# Patient Record
Sex: FEMALE | Race: WHITE | NOT HISPANIC OR LATINO | Employment: FULL TIME | URBAN - METROPOLITAN AREA
[De-identification: names, ages, dates, MRNs, and addresses within clinical notes are randomized per-mention and may not be internally consistent; named-entity substitution may affect disease eponyms.]

---

## 2017-09-08 ENCOUNTER — OFFICE VISIT (OUTPATIENT)
Dept: FAMILY MEDICINE | Facility: CLINIC | Age: 32
End: 2017-09-08
Payer: COMMERCIAL

## 2017-09-08 VITALS
TEMPERATURE: 98 F | WEIGHT: 224.88 LBS | HEIGHT: 61 IN | OXYGEN SATURATION: 97 % | SYSTOLIC BLOOD PRESSURE: 102 MMHG | DIASTOLIC BLOOD PRESSURE: 64 MMHG | BODY MASS INDEX: 42.46 KG/M2 | HEART RATE: 101 BPM

## 2017-09-08 DIAGNOSIS — F39 MOOD DISORDER: ICD-10-CM

## 2017-09-08 DIAGNOSIS — R53.83 FATIGUE, UNSPECIFIED TYPE: ICD-10-CM

## 2017-09-08 DIAGNOSIS — E04.1 THYROID NODULE: ICD-10-CM

## 2017-09-08 DIAGNOSIS — Z00.00 ANNUAL PHYSICAL EXAM: Primary | ICD-10-CM

## 2017-09-08 PROCEDURE — 99395 PREV VISIT EST AGE 18-39: CPT | Mod: S$GLB,,, | Performed by: FAMILY MEDICINE

## 2017-09-08 PROCEDURE — 99999 PR PBB SHADOW E&M-EST. PATIENT-LVL III: CPT | Mod: PBBFAC,,, | Performed by: FAMILY MEDICINE

## 2017-09-08 RX ORDER — VENLAFAXINE HYDROCHLORIDE 150 MG/1
CAPSULE, EXTENDED RELEASE ORAL
Qty: 90 CAPSULE | Refills: 3 | Status: CANCELLED | OUTPATIENT
Start: 2017-09-08

## 2017-09-08 RX ORDER — VENLAFAXINE HYDROCHLORIDE 37.5 MG/1
CAPSULE, EXTENDED RELEASE ORAL
Qty: 21 CAPSULE | Refills: 11 | Status: SHIPPED | OUTPATIENT
Start: 2017-09-08 | End: 2018-06-11

## 2017-09-08 RX ORDER — DESVENLAFAXINE SUCCINATE 50 MG/1
100 TABLET, EXTENDED RELEASE ORAL DAILY
Qty: 60 TABLET | Refills: 11 | Status: SHIPPED | OUTPATIENT
Start: 2017-09-08 | End: 2018-06-11

## 2017-09-08 NOTE — PROGRESS NOTES
"Subjective:       Patient ID: Lauren K Damico is a 32 y.o. female.    Chief Complaint: Annual Exam    Patient presents for an annual exam. She is on Effexor and is not sure if it is working. She states she is exhausted and doesn't want to get out of bed. She states she used to be constantly on the go.l she states she also feels like the anxiety is taking over after switching from effexor. She is vomiting and breaking out in hives. She also states sometimes she doesn't sleep. She states she has put on 30 pounds since being on Effexor. She was on Pristiq in the past and it worked well. She states her  has noticed a big difference in the last 6 months. 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       Review of Systems   Constitutional: Negative for chills, fatigue and fever.   Respiratory: Negative for cough, chest tightness, shortness of breath and wheezing.    Cardiovascular: Negative for chest pain, palpitations and leg swelling.   Gastrointestinal: Negative for abdominal pain, blood in stool, diarrhea, nausea and vomiting.   Genitourinary: Positive for menstrual problem. Negative for dysuria, frequency and hematuria.   Skin: Negative for rash.   Neurological: Negative for dizziness, syncope and light-headedness.       Objective:       Vitals:    09/08/17 1445   BP: 102/64   Pulse: 101   Temp: 98.1 °F (36.7 °C)   TempSrc: Oral   SpO2: 97%   Weight: 102 kg (224 lb 13.9 oz)   Height: 5' 1" (1.549 m)       Physical Exam   Constitutional: She appears well-developed and well-nourished. No distress.   HENT:   Head: Normocephalic.   Right Ear: External ear normal.   Left Ear: External ear normal.   Mouth/Throat: Oropharynx is clear and moist. No oropharyngeal exudate.   Eyes: Conjunctivae are normal.   Neck: Normal range of motion. Neck supple. Thyromegaly present.   Cardiovascular: Normal rate, regular rhythm and normal heart sounds.  Exam reveals no gallop and no friction rub.    No murmur heard.  Pulmonary/Chest: " Effort normal and breath sounds normal. No respiratory distress. She has no wheezes. She has no rales.   Abdominal: Soft. Bowel sounds are normal. She exhibits no distension and no mass. There is no tenderness. There is no rebound and no guarding.   Musculoskeletal: She exhibits no edema.   Lymphadenopathy:     She has no cervical adenopathy.   Neurological: She is alert.   Skin: No rash noted. She is not diaphoretic.   Psychiatric: She has a normal mood and affect.       Assessment:       1. Annual physical exam    2. Fatigue, unspecified type    3. BMI 30.0-30.9,adult    4. Mood disorder    5. Thyroid nodule        Plan:       Darby MCNALLY was seen today for annual exam.    Diagnoses and all orders for this visit:    Annual physical exam  -     Comprehensive metabolic panel; Future  -     Lipid panel; Future  -     CBC auto differential; Future  Due for labs    Fatigue, unspecified type  -     T4, free; Future  -     TSH; Future  -     T3; Future  -     Vitamin D; Future    BMI 30.0-30.9,adult  -     Hemoglobin A1c; Future  -     Hemoglobin A1c      Mood disorder  -     venlafaxine (EFFEXOR-XR) 37.5 MG 24 hr capsule; TAKE 2 TABLETS DAILY FOR A WEEK, THEN 1 TABLET DAILY FOR A WEEK  -     desvenlafaxine succinate (PRISTIQ) 50 MG Tb24; Take 2 tablets (100 mg total) by mouth once daily.    Thyroid nodule  -     US Soft Tissue Head Neck Thyroid; Future

## 2017-09-18 ENCOUNTER — PATIENT MESSAGE (OUTPATIENT)
Dept: FAMILY MEDICINE | Facility: CLINIC | Age: 32
End: 2017-09-18

## 2017-10-04 ENCOUNTER — LAB VISIT (OUTPATIENT)
Dept: LAB | Facility: HOSPITAL | Age: 32
End: 2017-10-04
Attending: FAMILY MEDICINE
Payer: COMMERCIAL

## 2017-10-04 DIAGNOSIS — R53.83 FATIGUE, UNSPECIFIED TYPE: ICD-10-CM

## 2017-10-04 DIAGNOSIS — Z00.00 ANNUAL PHYSICAL EXAM: ICD-10-CM

## 2017-10-04 LAB
25(OH)D3+25(OH)D2 SERPL-MCNC: 16 NG/ML
ALBUMIN SERPL BCP-MCNC: 3.6 G/DL
ALP SERPL-CCNC: 85 U/L
ALT SERPL W/O P-5'-P-CCNC: 28 U/L
ANION GAP SERPL CALC-SCNC: 8 MMOL/L
AST SERPL-CCNC: 17 U/L
BASOPHILS # BLD AUTO: 0.05 K/UL
BASOPHILS NFR BLD: 0.8 %
BILIRUB SERPL-MCNC: 0.3 MG/DL
BUN SERPL-MCNC: 15 MG/DL
CALCIUM SERPL-MCNC: 9.1 MG/DL
CHLORIDE SERPL-SCNC: 107 MMOL/L
CHOLEST SERPL-MCNC: 181 MG/DL
CHOLEST/HDLC SERPL: 4.5 {RATIO}
CO2 SERPL-SCNC: 24 MMOL/L
CREAT SERPL-MCNC: 0.7 MG/DL
DIFFERENTIAL METHOD: ABNORMAL
EOSINOPHIL # BLD AUTO: 0.2 K/UL
EOSINOPHIL NFR BLD: 3 %
ERYTHROCYTE [DISTWIDTH] IN BLOOD BY AUTOMATED COUNT: 14.3 %
EST. GFR  (AFRICAN AMERICAN): >60 ML/MIN/1.73 M^2
EST. GFR  (NON AFRICAN AMERICAN): >60 ML/MIN/1.73 M^2
GLUCOSE SERPL-MCNC: 114 MG/DL
HCT VFR BLD AUTO: 38.7 %
HDLC SERPL-MCNC: 40 MG/DL
HDLC SERPL: 22.1 %
HGB BLD-MCNC: 12.9 G/DL
LDLC SERPL CALC-MCNC: 123.6 MG/DL
LYMPHOCYTES # BLD AUTO: 2.2 K/UL
LYMPHOCYTES NFR BLD: 35.3 %
MCH RBC QN AUTO: 27.7 PG
MCHC RBC AUTO-ENTMCNC: 33.3 G/DL
MCV RBC AUTO: 83 FL
MONOCYTES # BLD AUTO: 0.5 K/UL
MONOCYTES NFR BLD: 7.3 %
NEUTROPHILS # BLD AUTO: 3.4 K/UL
NEUTROPHILS NFR BLD: 53.6 %
NONHDLC SERPL-MCNC: 141 MG/DL
PLATELET # BLD AUTO: 326 K/UL
PMV BLD AUTO: 8.4 FL
POTASSIUM SERPL-SCNC: 4.2 MMOL/L
PROT SERPL-MCNC: 7.4 G/DL
RBC # BLD AUTO: 4.66 M/UL
SODIUM SERPL-SCNC: 139 MMOL/L
T3 SERPL-MCNC: 103 NG/DL
T4 FREE SERPL-MCNC: 0.79 NG/DL
TRIGL SERPL-MCNC: 87 MG/DL
TSH SERPL DL<=0.005 MIU/L-ACNC: 1.79 UIU/ML
WBC # BLD AUTO: 6.31 K/UL

## 2017-10-04 PROCEDURE — 85025 COMPLETE CBC W/AUTO DIFF WBC: CPT

## 2017-10-04 PROCEDURE — 80061 LIPID PANEL: CPT

## 2017-10-04 PROCEDURE — 80053 COMPREHEN METABOLIC PANEL: CPT

## 2017-10-04 PROCEDURE — 84439 ASSAY OF FREE THYROXINE: CPT

## 2017-10-04 PROCEDURE — 84480 ASSAY TRIIODOTHYRONINE (T3): CPT

## 2017-10-04 PROCEDURE — 84443 ASSAY THYROID STIM HORMONE: CPT

## 2017-10-04 PROCEDURE — 82306 VITAMIN D 25 HYDROXY: CPT

## 2017-10-04 PROCEDURE — 36415 COLL VENOUS BLD VENIPUNCTURE: CPT

## 2018-06-11 ENCOUNTER — OFFICE VISIT (OUTPATIENT)
Dept: FAMILY MEDICINE | Facility: CLINIC | Age: 33
End: 2018-06-11
Payer: COMMERCIAL

## 2018-06-11 VITALS
TEMPERATURE: 99 F | WEIGHT: 232.56 LBS | DIASTOLIC BLOOD PRESSURE: 74 MMHG | HEIGHT: 61 IN | BODY MASS INDEX: 43.91 KG/M2 | HEART RATE: 100 BPM | OXYGEN SATURATION: 97 % | SYSTOLIC BLOOD PRESSURE: 120 MMHG

## 2018-06-11 DIAGNOSIS — F39 MOOD DISORDER: Primary | ICD-10-CM

## 2018-06-11 PROCEDURE — 99214 OFFICE O/P EST MOD 30 MIN: CPT | Mod: S$GLB,,, | Performed by: FAMILY MEDICINE

## 2018-06-11 PROCEDURE — 99999 PR PBB SHADOW E&M-EST. PATIENT-LVL III: CPT | Mod: PBBFAC,,, | Performed by: FAMILY MEDICINE

## 2018-06-11 RX ORDER — DESVENLAFAXINE 100 MG/1
200 TABLET, EXTENDED RELEASE ORAL DAILY
Qty: 60 TABLET | Refills: 5 | Status: SHIPPED | OUTPATIENT
Start: 2018-06-11 | End: 2019-01-02 | Stop reason: SDUPTHER

## 2018-06-11 RX ORDER — ALPRAZOLAM 0.5 MG/1
0.5 TABLET ORAL NIGHTLY PRN
Qty: 30 TABLET | Refills: 2 | Status: SHIPPED | OUTPATIENT
Start: 2018-06-11 | End: 2018-10-21 | Stop reason: SDUPTHER

## 2018-06-11 RX ORDER — DESVENLAFAXINE 100 MG/1
100 TABLET, EXTENDED RELEASE ORAL DAILY
Refills: 8 | COMMUNITY
Start: 2018-06-02 | End: 2018-06-11 | Stop reason: SDUPTHER

## 2018-06-11 RX ORDER — ALPRAZOLAM 0.5 MG/1
0.5 TABLET ORAL 3 TIMES DAILY
Refills: 0 | COMMUNITY
Start: 2018-04-14 | End: 2018-06-11 | Stop reason: SDUPTHER

## 2018-06-11 NOTE — PROGRESS NOTES
"Subjective:       Patient ID: Lauren K Damico is a 33 y.o. female.    Chief Complaint: Annual Exam and Discuss Medication (Xanax)    Patient is here for follow-up on her pristiq. She states she is getting anxiety attacks and she states her face and skin gets red and she has to vomit. She states the pristiq has worked for her energy and over eating. She states her father in law and her her grandmother passed away. She states she also miscarried 3 weeks after her grandmother passed away. She has tried yoga and essential oils. She states her cousin, who is a physician,  gave her a script for xanax and it helped. e  She states she is going to see Dr. Sethi to discuss fertility. She states she has been trying a year. She states she taks ovulation tests and states they are always negative. She typically doesn't have consecutive peirods.       Review of Systems    Objective:       Vitals:    06/11/18 1508   BP: 120/74   Pulse: 100   Temp: 98.5 °F (36.9 °C)   TempSrc: Oral   SpO2: 97%   Weight: 105.5 kg (232 lb 9.4 oz)   Height: 5' 1" (1.549 m)       Physical Exam   Constitutional: She is oriented to person, place, and time. She appears well-developed and well-nourished. No distress.   HENT:   Head: Normocephalic and atraumatic.   Neck: Normal range of motion. Neck supple. No thyromegaly present.   Cardiovascular: Normal rate and regular rhythm.  Exam reveals no gallop and no friction rub.    No murmur heard.  Pulmonary/Chest: Effort normal and breath sounds normal. No respiratory distress. She has no wheezes. She has no rales.   Neurological: She is alert and oriented to person, place, and time.   Skin: She is not diaphoretic.       Assessment:       1. Mood disorder        Plan:       Darby MCNALLY was seen today for annual exam and discuss medication (xanax).    Diagnoses and all orders for this visit:    Mood disorder  -     desvenlafaxine succinate (PRISTIQ) 100 MG Tb24; Take 2 tablets (200 mg total) by mouth once " daily.  -     ALPRAZolam (XANAX) 0.5 MG tablet; Take 1 tablet (0.5 mg total) by mouth nightly as needed for Anxiety.

## 2018-08-03 ENCOUNTER — TELEPHONE (OUTPATIENT)
Dept: OBSTETRICS AND GYNECOLOGY | Facility: CLINIC | Age: 33
End: 2018-08-03

## 2018-08-13 ENCOUNTER — OFFICE VISIT (OUTPATIENT)
Dept: FAMILY MEDICINE | Facility: CLINIC | Age: 33
End: 2018-08-13
Payer: COMMERCIAL

## 2018-08-13 VITALS
BODY MASS INDEX: 43.54 KG/M2 | WEIGHT: 230.63 LBS | DIASTOLIC BLOOD PRESSURE: 74 MMHG | HEIGHT: 61 IN | OXYGEN SATURATION: 98 % | HEART RATE: 94 BPM | SYSTOLIC BLOOD PRESSURE: 106 MMHG

## 2018-08-13 DIAGNOSIS — F39 MOOD DISORDER: ICD-10-CM

## 2018-08-13 DIAGNOSIS — R73.9 ELEVATED BLOOD SUGAR: Primary | ICD-10-CM

## 2018-08-13 PROCEDURE — 99214 OFFICE O/P EST MOD 30 MIN: CPT | Mod: S$GLB,,, | Performed by: FAMILY MEDICINE

## 2018-08-13 PROCEDURE — 99999 PR PBB SHADOW E&M-EST. PATIENT-LVL III: CPT | Mod: PBBFAC,,, | Performed by: FAMILY MEDICINE

## 2018-08-13 RX ORDER — DICYCLOMINE HYDROCHLORIDE 10 MG/1
CAPSULE ORAL
Refills: 1 | COMMUNITY
Start: 2018-07-24 | End: 2020-08-28

## 2018-08-13 NOTE — PROGRESS NOTES
"Subjective:       Patient ID: Lauren K Damico is a 33 y.o. female.    Chief Complaint: Mood Disorder and Follow-up    Patient present for follow-up on the Pristiq. She states she is doing much better. She is up to 200 mg. She has used the xanax a few times. She has some GI issues that she is seeing GI for. She is scheduled for an EGD and colonoscopy. She was told that she had an elevated blood sugar. She is considering having a gastric sleeve if her A1C is elevated. She states she doesn't have the fluctuations in her mood anymore. She is not sleeping all the time and her appetite has slowed down. She is not eating frequently. She had labs done by Leydi YEH with Dr. ESQUEDA on 7/26/18 at [facility].       Review of Systems    Objective:       Vitals:    08/13/18 1211   BP: 106/74   Pulse: 94   SpO2: 98%   Weight: 104.6 kg (230 lb 9.6 oz)   Height: 5' 1" (1.549 m)       Physical Exam   Constitutional: She is oriented to person, place, and time. She appears well-developed and well-nourished. No distress.   HENT:   Head: Normocephalic and atraumatic.   Neck: Normal range of motion. Neck supple.   Cardiovascular: Normal rate, regular rhythm and normal heart sounds. Exam reveals no gallop and no friction rub.   No murmur heard.  Neurological: She is alert and oriented to person, place, and time.   Skin: She is not diaphoretic.       Assessment:       1. Elevated blood sugar    2. Mood disorder        Plan:       Darby MCNALLY was seen today for mood disorder and follow-up.    Diagnoses and all orders for this visit:    Elevated blood sugar  -     Hemoglobin A1c; Future  -     Hemoglobin A1c  Ordering a1c and if it is elevated she wants to consider the gastric sleeve.   Mood disorder    continue pristiq 200 mg daily as she is stable.      "

## 2018-08-30 LAB — HBA1C MFR BLD: 5.8 %

## 2018-08-31 ENCOUNTER — OFFICE VISIT (OUTPATIENT)
Dept: OBSTETRICS AND GYNECOLOGY | Facility: CLINIC | Age: 33
End: 2018-08-31
Attending: OBSTETRICS & GYNECOLOGY
Payer: COMMERCIAL

## 2018-08-31 VITALS
DIASTOLIC BLOOD PRESSURE: 80 MMHG | HEIGHT: 61 IN | BODY MASS INDEX: 43.79 KG/M2 | WEIGHT: 231.94 LBS | SYSTOLIC BLOOD PRESSURE: 110 MMHG

## 2018-08-31 DIAGNOSIS — Z31.69 ENCOUNTER FOR PRECONCEPTION CONSULTATION: ICD-10-CM

## 2018-08-31 DIAGNOSIS — Z01.419 WELL WOMAN EXAM WITH ROUTINE GYNECOLOGICAL EXAM: Primary | ICD-10-CM

## 2018-08-31 DIAGNOSIS — N91.5 OLIGOMENORRHEA, UNSPECIFIED TYPE: ICD-10-CM

## 2018-08-31 LAB — HBA1C MFR BLD: 5.8 % OF TOTAL HGB

## 2018-08-31 PROCEDURE — 87624 HPV HI-RISK TYP POOLED RSLT: CPT

## 2018-08-31 PROCEDURE — 88141 CYTOPATH C/V INTERPRET: CPT | Mod: ,,, | Performed by: PATHOLOGY

## 2018-08-31 PROCEDURE — 88175 CYTOPATH C/V AUTO FLUID REDO: CPT | Performed by: PATHOLOGY

## 2018-08-31 PROCEDURE — 99385 PREV VISIT NEW AGE 18-39: CPT | Mod: 25,S$GLB,, | Performed by: OBSTETRICS & GYNECOLOGY

## 2018-08-31 NOTE — PROGRESS NOTES
CC: Well woman exam    Lauren K Damico is a 33 y.o. female  presents for well woman exam.  LMP: Patient's last menstrual period was 08/15/2018 (exact date)..  Previously on OCPs until 2015 was on loestrin from age [age].  Regular cycles x last 5 months, meaning occurring monthly though last up to 21 days, heavy with clotting, cramping.  Reports that she has cramping and a lot of pain during periods, diarrhea x multiple episodes during periods.  Using OPKs.  Vaginal pain during periods.  History of very abnormal periods, also reports that 6-7 days prior to periods, has a 6 hour period of projectile vomiting, then stops, usually wakes her from deep sleep, negative EGD/ colonoscopy.  GI then told the patient to f/u with GYN.  Killian Holt several years ago, was noted to have benign appearing ovarian cysts at that time.  Multiple family members with PCOS.  Reports consistent weight gain witn no changes in diet.  Reports that she has gained 60 pounds over the past 6 years, attributes some of this possibly to medications.  Has tried multiple diet systems without changes, reports clean eating overall.  Has been talking to her PCP about possible gastric sleeve.      Past Medical History:   Diagnosis Date    Abnormal Pap smear 12    colpo/LEEP    Anemia     Anxiety     PCOS (polycystic ovarian syndrome)      Past Surgical History:   Procedure Laterality Date    CERVICAL BIOPSY  W/ LOOP ELECTRODE EXCISION  10-17-12    GALLBLADDER SURGERY      WISDOM TOOTH EXTRACTION       Social History     Socioeconomic History    Marital status:      Spouse name: Not on file    Number of children: Not on file    Years of education: Not on file    Highest education level: Not on file   Social Needs    Financial resource strain: Not on file    Food insecurity - worry: Not on file    Food insecurity - inability: Not on file    Transportation needs - medical: Not on file    Transportation needs - non-medical: Not on  "file   Occupational History    Not on file   Tobacco Use    Smoking status: Never Smoker    Smokeless tobacco: Never Used   Substance and Sexual Activity    Alcohol use: Yes     Alcohol/week: 0.6 oz     Types: 1 Glasses of wine per week    Drug use: No    Sexual activity: Yes     Partners: Male     Birth control/protection: None   Other Topics Concern    Not on file   Social History Narrative    Not on file     Family History   Problem Relation Age of Onset    Ovarian cancer Paternal Grandmother     Breast cancer Paternal Aunt     Ovarian cancer Paternal Aunt     Breast cancer Paternal Aunt     Uterine cancer Paternal Aunt     Colon cancer Neg Hx      OB History      Para Term  AB Living    0              SAB TAB Ectopic Multiple Live Births                       /80   Ht 5' 1" (1.549 m)   Wt 105.2 kg (231 lb 14.8 oz)   LMP 08/15/2018 (Exact Date)   BMI 43.82 kg/m²       ROS:  GENERAL: Denies weight gain or weight loss. Feeling well overall.   SKIN: Denies rash or lesions.   HEAD: Denies head injury or headache.   NODES: Denies enlarged lymph nodes.   CHEST: Denies chest pain or shortness of breath.   CARDIOVASCULAR: Denies palpitations or left sided chest pain.   ABDOMEN: No abdominal pain, constipation, diarrhea, nausea, vomiting or rectal bleeding.   URINARY: No frequency, dysuria, hematuria, or burning on urination.  REPRODUCTIVE: See HPI.   BREASTS: The patient performs breast self-examination and denies pain, lumps, or nipple discharge.   HEMATOLOGIC: No easy bruisability or excessive bleeding.   MUSCULOSKELETAL: Denies joint pain or swelling.   NEUROLOGIC: Denies syncope or weakness.   PSYCHIATRIC: Denies depression, anxiety or mood swings.    PHYSICAL EXAM:  APPEARANCE: Well nourished, well developed, in no acute distress.  AFFECT: WNL, alert and oriented x 3  SKIN: No acne or hirsutism  NECK: Neck symmetric without masses or thyromegaly  NODES: No inguinal, cervical, " axillary, or femoral lymph node enlargement  CHEST: Good respiratory effect  ABDOMEN: Soft.  No tenderness or masses.  No hepatosplenomegaly.  No hernias.  BREASTS: Symmetrical, no skin changes or visible lesions.  No palpable masses, nipple discharge bilaterally.  PELVIC: Normal external genitalia without lesions.  Normal hair distribution.  Adequate perineal body, normal urethral meatus.  Vagina moist and well rugated without lesions or discharge.  Cervix pink, without lesions, discharge or tenderness.  No significant cystocele or rectocele.  Bimanual exam shows uterus to be normal size, regular, mobile and nontender.  Adnexa without masses or tenderness.    EXTREMITIES: No edema.    Well woman exam with routine gynecological exam  -     Liquid-based pap smear, screening  -     HPV High Risk Genotypes, PCR    Encounter for preconception consultation        - Offered options for manamgement of likely PCOS.  Discussed clomid vs weight loss surgery with OCPS and revisiting conception after normalizing HbA1c and weight.  Patient opts for the latter option at this point.    Oligomenorrhea, unspecified type  -     US Pelvis Comp with Transvag NON-OB (xpd; Future; Expected date: 08/31/2018    Other orders  -     norethindrone-e.estradiol-iron (JUNEL FE 24) 1 mg-20 mcg (24)/75 mg (4) Oral per tablet; Take 1 tablet by mouth once daily.  Dispense: 30 tablet; Refill: 11            Patient was counseled today on A.C.S. Pap guidelines and recommendations for yearly pelvic exams, mammograms and monthly self breast exams; to see her PCP for other health maintenance.     No Follow-up on file.

## 2018-09-06 LAB
HPV HR 12 DNA CVX QL NAA+PROBE: NEGATIVE
HPV16 AG SPEC QL: NEGATIVE
HPV18 DNA SPEC QL NAA+PROBE: NEGATIVE

## 2018-10-06 ENCOUNTER — TELEPHONE (OUTPATIENT)
Dept: ADMINISTRATIVE | Facility: HOSPITAL | Age: 33
End: 2018-10-06

## 2018-10-22 RX ORDER — ALPRAZOLAM 0.5 MG/1
TABLET ORAL
Qty: 30 TABLET | Refills: 0 | Status: SHIPPED | OUTPATIENT
Start: 2018-10-22 | End: 2019-01-02 | Stop reason: SDUPTHER

## 2018-11-12 ENCOUNTER — OFFICE VISIT (OUTPATIENT)
Dept: FAMILY MEDICINE | Facility: CLINIC | Age: 33
End: 2018-11-12
Payer: COMMERCIAL

## 2018-11-12 ENCOUNTER — TELEPHONE (OUTPATIENT)
Dept: FAMILY MEDICINE | Facility: CLINIC | Age: 33
End: 2018-11-12

## 2018-11-12 ENCOUNTER — PATIENT MESSAGE (OUTPATIENT)
Dept: FAMILY MEDICINE | Facility: CLINIC | Age: 33
End: 2018-11-12

## 2018-11-12 VITALS
WEIGHT: 235.25 LBS | TEMPERATURE: 98 F | OXYGEN SATURATION: 97 % | HEART RATE: 85 BPM | SYSTOLIC BLOOD PRESSURE: 110 MMHG | HEIGHT: 61 IN | DIASTOLIC BLOOD PRESSURE: 62 MMHG | BODY MASS INDEX: 44.42 KG/M2

## 2018-11-12 DIAGNOSIS — Z01.818 PREOP EXAMINATION: Primary | ICD-10-CM

## 2018-11-12 PROCEDURE — 99999 PR PBB SHADOW E&M-EST. PATIENT-LVL III: CPT | Mod: PBBFAC,,, | Performed by: FAMILY MEDICINE

## 2018-11-12 PROCEDURE — 93010 ELECTROCARDIOGRAM REPORT: CPT | Mod: S$GLB,,, | Performed by: INTERNAL MEDICINE

## 2018-11-12 PROCEDURE — 93005 ELECTROCARDIOGRAM TRACING: CPT | Mod: S$GLB,,, | Performed by: FAMILY MEDICINE

## 2018-11-12 PROCEDURE — 99214 OFFICE O/P EST MOD 30 MIN: CPT | Mod: S$GLB,,, | Performed by: FAMILY MEDICINE

## 2018-11-12 RX ORDER — HYDROGEN PEROXIDE 3 %
20 SOLUTION, NON-ORAL MISCELLANEOUS 2 TIMES DAILY
Refills: 0 | COMMUNITY
Start: 2018-10-23

## 2018-11-12 NOTE — PROGRESS NOTES
"Subjective:       Patient ID: Lauren K Damico is a 33 y.o. female.    Chief Complaint: pre op clearance    Patient presents for preoperative clearance for gastric sleeve. She is going to do the gastric sleeve with Dr. Jasson Short.       Review of Systems   Constitutional: Negative for activity change, chills, fatigue, fever and unexpected weight change.   HENT: Negative for hearing loss, rhinorrhea and trouble swallowing.    Eyes: Negative for discharge and visual disturbance.   Respiratory: Positive for shortness of breath. Negative for cough, chest tightness and wheezing.    Cardiovascular: Negative for chest pain, palpitations and leg swelling.   Gastrointestinal: Negative for abdominal pain, blood in stool, constipation, diarrhea, nausea and vomiting.   Endocrine: Negative for polydipsia and polyuria.   Genitourinary: Negative for difficulty urinating, dysuria, hematuria and menstrual problem.   Musculoskeletal: Negative for arthralgias, joint swelling and neck pain.   Neurological: Negative for dizziness, syncope, weakness, light-headedness and headaches.   Psychiatric/Behavioral: Negative for confusion and dysphoric mood.       Objective:       Vitals:    11/12/18 1129   BP: 110/62   Pulse: 85   Temp: 98.4 °F (36.9 °C)   TempSrc: Oral   SpO2: 97%   Weight: 106.7 kg (235 lb 3.7 oz)   Height: 5' 1" (1.549 m)       Physical Exam   Constitutional: She appears well-developed and well-nourished. No distress.   HENT:   Head: Normocephalic.   Right Ear: External ear normal.   Left Ear: External ear normal.   Mouth/Throat: Oropharynx is clear and moist. No oropharyngeal exudate.   Eyes: Conjunctivae are normal.   Neck: Normal range of motion. Neck supple. No thyromegaly present.   Cardiovascular: Normal rate, regular rhythm and normal heart sounds. Exam reveals no gallop and no friction rub.   No murmur heard.  Pulmonary/Chest: Effort normal and breath sounds normal. No respiratory distress. She has no wheezes. She " has no rales.   Abdominal: Soft. Bowel sounds are normal. She exhibits no distension and no mass. There is no tenderness. There is no rebound and no guarding.   Musculoskeletal: She exhibits no edema.   Lymphadenopathy:     She has no cervical adenopathy.   Neurological: She is alert.   Skin: No rash noted. She is not diaphoretic.   Psychiatric: She has a normal mood and affect.       Assessment:       1. Preop examination        Plan:       Darby MCNALLY was seen today for pre op clearance.    Diagnoses and all orders for this visit:    Preop examination  -     EKG 12-lead      Labs and ekg reviewed. She is cleared for surgery.        Send preop clearance to Dr. Jasson TAVAREZ -[phone] (FAX) [phone] (PHONE)

## 2018-11-12 NOTE — TELEPHONE ENCOUNTER
----- Message from Marie Fang MD sent at 11/12/2018  3:17 PM CST -----  Send preop clearance to Dr. Jasson TAVAREZ -[phone] (FAX) [phone] (PHONE). clearance Letter and note

## 2019-01-02 RX ORDER — DESVENLAFAXINE 100 MG/1
TABLET, EXTENDED RELEASE ORAL
Qty: 60 TABLET | Refills: 5 | Status: SHIPPED | OUTPATIENT
Start: 2019-01-02 | End: 2019-05-29 | Stop reason: SDUPTHER

## 2019-01-02 RX ORDER — ALPRAZOLAM 0.5 MG/1
TABLET ORAL
Qty: 30 TABLET | Refills: 3 | Status: SHIPPED | OUTPATIENT
Start: 2019-01-02 | End: 2019-05-30 | Stop reason: SDUPTHER

## 2019-01-09 ENCOUNTER — OFFICE VISIT (OUTPATIENT)
Dept: FAMILY MEDICINE | Facility: CLINIC | Age: 34
End: 2019-01-09
Payer: COMMERCIAL

## 2019-01-09 VITALS
TEMPERATURE: 98 F | SYSTOLIC BLOOD PRESSURE: 118 MMHG | OXYGEN SATURATION: 97 % | HEIGHT: 61 IN | DIASTOLIC BLOOD PRESSURE: 70 MMHG | BODY MASS INDEX: 40.25 KG/M2 | WEIGHT: 213.19 LBS | HEART RATE: 107 BPM

## 2019-01-09 DIAGNOSIS — R30.0 DYSURIA: Primary | ICD-10-CM

## 2019-01-09 DIAGNOSIS — N30.00 ACUTE CYSTITIS WITHOUT HEMATURIA: ICD-10-CM

## 2019-01-09 LAB
BACTERIA #/AREA URNS HPF: ABNORMAL /HPF
BILIRUB UR QL STRIP: NEGATIVE
CLARITY UR: ABNORMAL
COLOR UR: ABNORMAL
GLUCOSE UR QL STRIP: NEGATIVE
HGB UR QL STRIP: ABNORMAL
HYALINE CASTS #/AREA URNS LPF: 0 /LPF
KETONES UR QL STRIP: ABNORMAL
LEUKOCYTE ESTERASE UR QL STRIP: ABNORMAL
MICROSCOPIC COMMENT: ABNORMAL
NITRITE UR QL STRIP: POSITIVE
PH UR STRIP: 7 [PH] (ref 5–8)
PROT UR QL STRIP: ABNORMAL
RBC #/AREA URNS HPF: 2 /HPF (ref 0–4)
SP GR UR STRIP: 1.02 (ref 1–1.03)
SQUAMOUS #/AREA URNS HPF: 5 /HPF
URN SPEC COLLECT METH UR: ABNORMAL
UROBILINOGEN UR STRIP-ACNC: ABNORMAL EU/DL
WBC #/AREA URNS HPF: 15 /HPF (ref 0–5)

## 2019-01-09 PROCEDURE — 3008F BODY MASS INDEX DOCD: CPT | Mod: CPTII,S$GLB,, | Performed by: PHYSICIAN ASSISTANT

## 2019-01-09 PROCEDURE — 99214 PR OFFICE/OUTPT VISIT, EST, LEVL IV, 30-39 MIN: ICD-10-PCS | Mod: S$GLB,,, | Performed by: PHYSICIAN ASSISTANT

## 2019-01-09 PROCEDURE — 99999 PR PBB SHADOW E&M-EST. PATIENT-LVL III: CPT | Mod: PBBFAC,,, | Performed by: PHYSICIAN ASSISTANT

## 2019-01-09 PROCEDURE — 87186 SC STD MICRODIL/AGAR DIL: CPT

## 2019-01-09 PROCEDURE — 81000 URINALYSIS NONAUTO W/SCOPE: CPT

## 2019-01-09 PROCEDURE — 99999 PR PBB SHADOW E&M-EST. PATIENT-LVL III: ICD-10-PCS | Mod: PBBFAC,,, | Performed by: PHYSICIAN ASSISTANT

## 2019-01-09 PROCEDURE — 99214 OFFICE O/P EST MOD 30 MIN: CPT | Mod: S$GLB,,, | Performed by: PHYSICIAN ASSISTANT

## 2019-01-09 PROCEDURE — 3008F PR BODY MASS INDEX (BMI) DOCUMENTED: ICD-10-PCS | Mod: CPTII,S$GLB,, | Performed by: PHYSICIAN ASSISTANT

## 2019-01-09 PROCEDURE — 87077 CULTURE AEROBIC IDENTIFY: CPT

## 2019-01-09 PROCEDURE — 87088 URINE BACTERIA CULTURE: CPT

## 2019-01-09 PROCEDURE — 87086 URINE CULTURE/COLONY COUNT: CPT

## 2019-01-09 RX ORDER — NITROFURANTOIN 25; 75 MG/1; MG/1
100 CAPSULE ORAL 2 TIMES DAILY
Qty: 6 CAPSULE | Refills: 0 | Status: SHIPPED | OUTPATIENT
Start: 2019-01-09 | End: 2019-01-11 | Stop reason: ALTCHOICE

## 2019-01-09 NOTE — PROGRESS NOTES
Subjective:       Patient ID: Lauren K Damico is a 33 y.o. female with multiple medical diagnoses as listed in the medical history and problem list that presents for Urinary Tract Infection  .    Chief Complaint: Urinary Tract Infection      Urinary Tract Infection    This is a new problem. The current episode started in the past 7 days (sunday). The problem occurs every urination. The problem has been gradually worsening. The quality of the pain is described as burning. There has been no fever. She is sexually active. There is no history of pyelonephritis. Associated symptoms include frequency and urgency. Pertinent negatives include no chills, discharge, flank pain, vomiting or bubble bath use. Treatments tried: azo last dose this am  The treatment provided mild relief. There is no history of recurrent UTIs.     Review of Systems   Constitutional: Negative for chills.   Gastrointestinal: Negative for vomiting.   Genitourinary: Positive for frequency and urgency. Negative for flank pain.         PAST MEDICAL HISTORY:  Past Medical History:   Diagnosis Date    Abnormal Pap smear 9/19/12    colpo/LEEP    Anemia     Anxiety     PCOS (polycystic ovarian syndrome)        SOCIAL HISTORY:  Social History     Socioeconomic History    Marital status:      Spouse name: Not on file    Number of children: Not on file    Years of education: Not on file    Highest education level: Not on file   Social Needs    Financial resource strain: Not on file    Food insecurity - worry: Not on file    Food insecurity - inability: Not on file    Transportation needs - medical: Not on file    Transportation needs - non-medical: Not on file   Occupational History    Not on file   Tobacco Use    Smoking status: Never Smoker    Smokeless tobacco: Never Used   Substance and Sexual Activity    Alcohol use: Yes     Alcohol/week: 0.6 oz     Types: 1 Glasses of wine per week    Drug use: No    Sexual activity: Yes      "Partners: Male     Birth control/protection: None   Other Topics Concern    Not on file   Social History Narrative    Not on file       ALLERGIES AND MEDICATIONS: updated and reviewed.  Review of patient's allergies indicates:  No Known Allergies  Current Outpatient Medications   Medication Sig Dispense Refill    ALPRAZolam (XANAX) 0.5 MG tablet TAKE ONE TABLET BY MOUTH NIGHTLY AS NEEDED FOR ANXIETY 30 tablet 3    biotin 2,500 mcg Tab Take 3 tablets by mouth Daily.  Capsule Oral       cholecalciferol, vitamin D3, (VITAMIN D) 5,000 unit Tab Take 1 tablet by mouth Daily.  Tablet Oral       desvenlafaxine succinate (PRISTIQ) 100 MG Tb24 TAKE 2 TABLETS ONCE DAILY 60 tablet 5    dicyclomine (BENTYL) 10 MG capsule TAKE ONE CAPSULE BY MOUTH TWICE DAILY MAY CAUSE DROWSINESS MAY CAUSE DIZZINESS  1    esomeprazole (NEXIUM) 20 MG capsule Take 20 mg by mouth 2 (two) times daily.  0    nitrofurantoin, macrocrystal-monohydrate, (MACROBID) 100 MG capsule Take 1 capsule (100 mg total) by mouth 2 (two) times daily. for 3 days 6 capsule 0    norethindrone-e.estradiol-iron (JUNEL FE 24) 1 mg-20 mcg (24)/75 mg (4) Oral per tablet Take 1 tablet by mouth once daily. 30 tablet 11     No current facility-administered medications for this visit.          Objective:   /70   Pulse 107   Temp 97.5 °F (36.4 °C) (Oral)   Ht 5' 1" (1.549 m)   Wt 96.7 kg (213 lb 3 oz)   LMP 12/10/2018   SpO2 97%   BMI 40.28 kg/m²      Physical Exam   Constitutional: She is oriented to person, place, and time. She appears well-developed and well-nourished. No distress.   Cardiovascular: Normal rate and regular rhythm.   Pulmonary/Chest: Effort normal and breath sounds normal.   Abdominal: Soft. There is no tenderness. There is no CVA tenderness.   Neurological: She is alert and oriented to person, place, and time.   Skin: Skin is warm and dry. No rash noted.   Psychiatric: She has a normal mood and affect. Her behavior is normal.     "       Assessment:       1. Dysuria    2. Acute cystitis without hematuria        Plan:       Dysuria  -     Urinalysis; Future; Expected date: 01/10/2019  -     Urine culture; Future; Expected date: 01/09/2019    Acute cystitis without hematuria  -     nitrofurantoin, macrocrystal-monohydrate, (MACROBID) 100 MG capsule; Take 1 capsule (100 mg total) by mouth 2 (two) times daily. for 3 days  Dispense: 6 capsule; Refill: 0    -will contact with results           No Follow-up on file.

## 2019-01-11 DIAGNOSIS — N30.00 ACUTE CYSTITIS WITHOUT HEMATURIA: Primary | ICD-10-CM

## 2019-01-11 LAB — BACTERIA UR CULT: NORMAL

## 2019-01-11 RX ORDER — SULFAMETHOXAZOLE AND TRIMETHOPRIM 800; 160 MG/1; MG/1
1 TABLET ORAL 2 TIMES DAILY
Qty: 6 TABLET | Refills: 0 | Status: SHIPPED | OUTPATIENT
Start: 2019-01-11 | End: 2019-01-14

## 2019-01-29 ENCOUNTER — TELEPHONE (OUTPATIENT)
Dept: ADMINISTRATIVE | Facility: HOSPITAL | Age: 34
End: 2019-01-29

## 2019-05-16 RX ORDER — ALPRAZOLAM 0.5 MG/1
TABLET ORAL
Qty: 30 TABLET | OUTPATIENT
Start: 2019-05-16

## 2019-05-28 ENCOUNTER — PATIENT MESSAGE (OUTPATIENT)
Dept: ADMINISTRATIVE | Facility: OTHER | Age: 34
End: 2019-05-28

## 2019-05-29 ENCOUNTER — OFFICE VISIT (OUTPATIENT)
Dept: FAMILY MEDICINE | Facility: CLINIC | Age: 34
End: 2019-05-29
Payer: COMMERCIAL

## 2019-05-29 VITALS
WEIGHT: 189.38 LBS | DIASTOLIC BLOOD PRESSURE: 64 MMHG | HEART RATE: 95 BPM | SYSTOLIC BLOOD PRESSURE: 110 MMHG | BODY MASS INDEX: 35.75 KG/M2 | TEMPERATURE: 99 F | OXYGEN SATURATION: 99 % | HEIGHT: 61 IN

## 2019-05-29 DIAGNOSIS — F39 MOOD DISORDER: Primary | ICD-10-CM

## 2019-05-29 PROCEDURE — 99999 PR PBB SHADOW E&M-EST. PATIENT-LVL III: CPT | Mod: PBBFAC,,, | Performed by: FAMILY MEDICINE

## 2019-05-29 PROCEDURE — 3008F PR BODY MASS INDEX (BMI) DOCUMENTED: ICD-10-PCS | Mod: CPTII,S$GLB,, | Performed by: FAMILY MEDICINE

## 2019-05-29 PROCEDURE — 3008F BODY MASS INDEX DOCD: CPT | Mod: CPTII,S$GLB,, | Performed by: FAMILY MEDICINE

## 2019-05-29 PROCEDURE — 99214 PR OFFICE/OUTPT VISIT, EST, LEVL IV, 30-39 MIN: ICD-10-PCS | Mod: S$GLB,,, | Performed by: FAMILY MEDICINE

## 2019-05-29 PROCEDURE — 99999 PR PBB SHADOW E&M-EST. PATIENT-LVL III: ICD-10-PCS | Mod: PBBFAC,,, | Performed by: FAMILY MEDICINE

## 2019-05-29 PROCEDURE — 99214 OFFICE O/P EST MOD 30 MIN: CPT | Mod: S$GLB,,, | Performed by: FAMILY MEDICINE

## 2019-05-29 RX ORDER — DESVENLAFAXINE 100 MG/1
200 TABLET, EXTENDED RELEASE ORAL DAILY
Qty: 60 TABLET | Refills: 5 | Status: SHIPPED | OUTPATIENT
Start: 2019-05-29 | End: 2020-01-08 | Stop reason: SDUPTHER

## 2019-05-29 NOTE — PROGRESS NOTES
Subjective:       Patient ID: Lauren K Damico is a 34 y.o. female.    Chief Complaint: Discuss Med Changes    34 year old female presents for follow up on her mood. She lacks motivation, she is anxious and irritable. She states she is indecisive and not comfortable making decisions. She is on pristiq 100 mg daily. She stats she is taking xanax as she is getting nervous and breaks out in hives and gets nauseated. She is not sleeping at all at night. She states effexor made her too exhausted. She was on pristiq 200 mg and it did well, but her insurance won't cover the 200 mg. She is therefore taking 100 mg daily. She has changed 4 jobs in the last year.       Past Medical History:  9/19/12: Abnormal Pap smear      Comment:  colpo/LEEP  No date: Anemia  No date: Anxiety  No date: PCOS (polycystic ovarian syndrome)   Past Surgical History:  10-17-12: CERVICAL BIOPSY  W/ LOOP ELECTRODE EXCISION  No date: GALLBLADDER SURGERY  No date: WISDOM TOOTH EXTRACTION  Review of patient's family history indicates:  Problem: Ovarian cancer      Relation: Paternal Grandmother          Age of Onset: (Not Specified)  Problem: Breast cancer      Relation: Paternal Aunt          Age of Onset: (Not Specified)  Problem: Ovarian cancer      Relation: Paternal Aunt          Age of Onset: (Not Specified)  Problem: Breast cancer      Relation: Paternal Aunt          Age of Onset: (Not Specified)  Problem: Uterine cancer      Relation: Paternal Aunt          Age of Onset: (Not Specified)  Problem: Colon cancer      Relation: Neg Hx          Age of Onset: (Not Specified)    Social History    Socioeconomic History      Marital status:       Spouse name: Not on file      Number of children: Not on file      Years of education: Not on file      Highest education level: Not on file    Occupational History      Not on file    Social Needs      Financial resource strain: Not hard at all      Food insecurity:        Worry: Never true         Inability: Never true      Transportation needs:        Medical: No        Non-medical: No    Tobacco Use      Smoking status: Never Smoker      Smokeless tobacco: Never Used    Substance and Sexual Activity      Alcohol use: Yes        Alcohol/week: 0.6 oz        Types: 1 Glasses of wine per week        Frequency: 2-3 times a week        Drinks per session: 1 or 2        Binge frequency: Less than monthly      Drug use: No      Sexual activity: Yes        Partners: Male        Birth control/protection: None    Lifestyle      Physical activity:        Days per week: 3 days        Minutes per session: 30 min      Stress: Very much    Relationships      Social connections:        Talks on phone: More than three times a week        Gets together: More than three times a week        Attends Church service: Not on file        Active member of club or organization: No        Attends meetings of clubs or organizations: Never        Relationship status:     Other Topics      Concerns:        Not on file    Social History Narrative      Not on file        Review of Systems   Constitutional: Negative for activity change and unexpected weight change.   HENT: Negative for hearing loss, rhinorrhea and trouble swallowing.    Eyes: Negative for discharge and visual disturbance.   Respiratory: Negative for chest tightness and wheezing.    Cardiovascular: Negative for chest pain and palpitations.   Gastrointestinal: Negative for blood in stool, constipation, diarrhea and vomiting.   Endocrine: Negative for polydipsia and polyuria.   Genitourinary: Negative for difficulty urinating, dysuria, hematuria and menstrual problem.   Musculoskeletal: Negative for arthralgias, joint swelling and neck pain.   Neurological: Negative for weakness and headaches.   Psychiatric/Behavioral: Positive for dysphoric mood. Negative for confusion.       Objective:       Vitals:    05/29/19 0801   BP: 110/64   Pulse: 95   Temp: 98.8 °F (37.1 °C)  "  TempSrc: Oral   SpO2: 99%   Weight: 85.9 kg (189 lb 6 oz)   Height: 5' 1" (1.549 m)       Physical Exam   Constitutional: She is oriented to person, place, and time. She appears well-developed and well-nourished.   HENT:   Head: Normocephalic and atraumatic.   Neck: Normal range of motion. Neck supple.   Cardiovascular: Normal rate, regular rhythm and normal heart sounds. Exam reveals no gallop and no friction rub.   No murmur heard.  Pulmonary/Chest: Effort normal and breath sounds normal. No stridor. No respiratory distress. She has no wheezes. She has no rales.   Musculoskeletal: She exhibits no edema.   Neurological: She is alert and oriented to person, place, and time.       Assessment:       1. Mood disorder        Plan:       Darby MCNALLY was seen today for discuss med changes.    Diagnoses and all orders for this visit:    Mood disorder  -     desvenlafaxine succinate (PRISTIQ) 100 MG Tb24; Take 2 tablets (200 mg total) by mouth once daily.         will try to get the 200 mg of PRISTIQ approved through [facility]  "

## 2019-05-30 RX ORDER — ALPRAZOLAM 0.5 MG/1
TABLET ORAL
Qty: 30 TABLET | Refills: 2 | Status: SHIPPED | OUTPATIENT
Start: 2019-05-30 | End: 2020-01-10

## 2019-06-11 ENCOUNTER — TELEPHONE (OUTPATIENT)
Dept: PHARMACY | Facility: CLINIC | Age: 34
End: 2019-06-11

## 2019-09-18 RX ORDER — ALPRAZOLAM 0.5 MG/1
TABLET ORAL
Qty: 30 TABLET | OUTPATIENT
Start: 2019-09-18

## 2019-09-19 NOTE — TELEPHONE ENCOUNTER
----- Message from Jes Cuevas sent at 9/19/2019  8:52 AM CDT -----  Contact: DAMICO,LAUREN K [[id number]]      Can the clinic reply in [facility]: no    Please refill the medication(s) listed below. Please call the patient when the prescription(s) is ready for  at this phone number  [phone] (home)     Medication #1 norethindrone-e.estradiol-iron (JUNEL FE 24) 1 mg-20 mcg (24)/75 mg (4) Oral per tablet       Preferred Pharmacy:   [facility] - [place] - [place] - [address]  [address]  [place] [address]  Phone: [phone] Fax: [phone]

## 2019-11-14 ENCOUNTER — PATIENT OUTREACH (OUTPATIENT)
Dept: ADMINISTRATIVE | Facility: OTHER | Age: 34
End: 2019-11-14

## 2019-11-19 ENCOUNTER — OFFICE VISIT (OUTPATIENT)
Dept: OBSTETRICS AND GYNECOLOGY | Facility: CLINIC | Age: 34
End: 2019-11-19
Payer: COMMERCIAL

## 2019-11-19 VITALS
WEIGHT: 180.56 LBS | DIASTOLIC BLOOD PRESSURE: 80 MMHG | HEIGHT: 61 IN | BODY MASS INDEX: 34.09 KG/M2 | SYSTOLIC BLOOD PRESSURE: 120 MMHG

## 2019-11-19 DIAGNOSIS — Z31.9 PROCREATIVE MANAGEMENT: ICD-10-CM

## 2019-11-19 DIAGNOSIS — N87.9 CERVICAL DYSPLASIA: ICD-10-CM

## 2019-11-19 DIAGNOSIS — E28.2 PCOS (POLYCYSTIC OVARIAN SYNDROME): Primary | ICD-10-CM

## 2019-11-19 DIAGNOSIS — E66.9 OBESITY (BMI 30-39.9): ICD-10-CM

## 2019-11-19 LAB
DHEA-S SERPL-MCNC: 245.5 UG/DL (ref 95.8–511.7)
ESTIMATED AVG GLUCOSE: 108 MG/DL (ref 68–131)
ESTRADIOL SERPL-MCNC: 59 PG/ML
FSH SERPL-ACNC: 5.9 MIU/ML
GLUCOSE SERPL-MCNC: 92 MG/DL (ref 70–110)
HBA1C MFR BLD HPLC: 5.4 % (ref 4–5.6)
LH SERPL-ACNC: 10.1 MIU/ML
LH SERPL-ACNC: 10.1 MIU/ML
PROGEST SERPL-MCNC: 0.1 NG/ML
PROLACTIN SERPL IA-MCNC: 9.1 NG/ML (ref 5.2–26.5)
TSH SERPL DL<=0.005 MIU/L-ACNC: 1.27 UIU/ML (ref 0.4–4)

## 2019-11-19 PROCEDURE — 99215 OFFICE O/P EST HI 40 MIN: CPT | Mod: S$GLB,,, | Performed by: OBSTETRICS & GYNECOLOGY

## 2019-11-19 PROCEDURE — 84402 ASSAY OF FREE TESTOSTERONE: CPT

## 2019-11-19 PROCEDURE — 83036 HEMOGLOBIN GLYCOSYLATED A1C: CPT

## 2019-11-19 PROCEDURE — 3008F PR BODY MASS INDEX (BMI) DOCUMENTED: ICD-10-PCS | Mod: CPTII,S$GLB,, | Performed by: OBSTETRICS & GYNECOLOGY

## 2019-11-19 PROCEDURE — 99999 PR PBB SHADOW E&M-EST. PATIENT-LVL III: CPT | Mod: PBBFAC,,, | Performed by: OBSTETRICS & GYNECOLOGY

## 2019-11-19 PROCEDURE — 83001 ASSAY OF GONADOTROPIN (FSH): CPT

## 2019-11-19 PROCEDURE — 83002 ASSAY OF GONADOTROPIN (LH): CPT

## 2019-11-19 PROCEDURE — 99999 PR PBB SHADOW E&M-EST. PATIENT-LVL III: ICD-10-PCS | Mod: PBBFAC,,, | Performed by: OBSTETRICS & GYNECOLOGY

## 2019-11-19 PROCEDURE — 36415 COLL VENOUS BLD VENIPUNCTURE: CPT

## 2019-11-19 PROCEDURE — 84146 ASSAY OF PROLACTIN: CPT

## 2019-11-19 PROCEDURE — 84144 ASSAY OF PROGESTERONE: CPT

## 2019-11-19 PROCEDURE — 3008F BODY MASS INDEX DOCD: CPT | Mod: CPTII,S$GLB,, | Performed by: OBSTETRICS & GYNECOLOGY

## 2019-11-19 PROCEDURE — 82627 DEHYDROEPIANDROSTERONE: CPT

## 2019-11-19 PROCEDURE — 99215 PR OFFICE/OUTPT VISIT, EST, LEVL V, 40-54 MIN: ICD-10-PCS | Mod: S$GLB,,, | Performed by: OBSTETRICS & GYNECOLOGY

## 2019-11-19 PROCEDURE — 84443 ASSAY THYROID STIM HORMONE: CPT

## 2019-11-19 PROCEDURE — 82947 ASSAY GLUCOSE BLOOD QUANT: CPT

## 2019-11-19 PROCEDURE — 82670 ASSAY OF TOTAL ESTRADIOL: CPT

## 2019-11-19 NOTE — PROGRESS NOTES
"CC: PCOS    Lauren K Damico is a 34 y.o. female  presents for PCOS discussion  First cycle at age 14.   Irregular cycles.  Heavier cycles.  Cycles are about 52 days apart.   Going through many tampons/  Pads.  Having anxiety.  Trying conceive to two years.  Doing ovulation kits.  Does not have positive ovulation based on the kits.  Weight gain was an issue,  S/P gastric sleeve-  Lost 65 lbs  NO OCPS since .   She wants to get pregnant.    Past Medical History:   Diagnosis Date    Abnormal Pap smear 12    colpo/LEEP    Anemia     Anxiety     PCOS (polycystic ovarian syndrome)        Past Surgical History:   Procedure Laterality Date    CERVICAL BIOPSY  W/ LOOP ELECTRODE EXCISION  10-17-12    GALLBLADDER SURGERY      GASTRIC RESTRICTION SURGERY      gastric sleeve in 2018    WISDOM TOOTH EXTRACTION         OB History    Para Term  AB Living   0             SAB TAB Ectopic Multiple Live Births                   Family History   Problem Relation Age of Onset    Ovarian cancer Paternal Grandmother     Breast cancer Paternal Aunt     Ovarian cancer Paternal Aunt     Breast cancer Paternal Aunt     Uterine cancer Paternal Aunt     Colon cancer Neg Hx        Social History     Tobacco Use    Smoking status: Never Smoker    Smokeless tobacco: Never Used   Substance Use Topics    Alcohol use: Yes     Alcohol/week: 1.0 standard drinks     Types: 1 Glasses of wine per week     Frequency: 2-3 times a week     Drinks per session: 1 or 2     Binge frequency: Less than monthly    Drug use: No       /80   Ht 5' 1" (1.549 m)   Wt 81.9 kg (180 lb 8.9 oz)   LMP 10/27/2019 (Exact Date)   BMI 34.12 kg/m²     ROS:  GENERAL: Denies weight gain or weight loss. Feeling well overall.   SKIN: Denies rash or lesions.   HEAD: Denies head injury or headache.   NODES: Denies enlarged lymph nodes.   CHEST: Denies chest pain or shortness of breath.   CARDIOVASCULAR: Denies palpitations or left " sided chest pain.   ABDOMEN: No abdominal pain, constipation, diarrhea, nausea, vomiting or rectal bleeding.   URINARY: No frequency, dysuria, hematuria, or burning on urination.  REPRODUCTIVE: See HPI.   BREASTS: The patient performs breast self-examination and denies pain, lumps, or nipple discharge.   HEMATOLOGIC: No easy bruisability or excessive bleeding.  MUSCULOSKELETAL: Denies joint pain or swelling.   NEUROLOGIC: Denies syncope or weakness.   PSYCHIATRIC: Denies depression, anxiety or mood swings.    Physical Exam:    APPEARANCE: Well nourished, well developed, in no acute distress.  AFFECT: WNL, alert and oriented x 3  SKIN: No acne or hirsutism  NECK: Neck symmetric without masses or thyromegaly  NODES: No inguinal, cervical, axillary, or femoral lymph node enlargement    ASSESSMENT AND PLAN  1. PCOS (polycystic ovarian syndrome)  DHEA-sulfate    Estradiol    Hemoglobin A1c    Luteinizing hormone    Progesterone    Prolactin    Testosterone, free    TSH    Luteinizing hormone    Follicle stimulating hormone    US Pelvis Comp with Transvag NON-OB (xpd    GLUCOSE, FASTING    GLUCOSE, FASTING   2. Procreative management     3. Obesity (BMI 30-39.9)     4. Cervical dysplasia         DISSUssed PCOS center.  Discussed the team of providers who work with the patient on GYN concerns,  Cycle control,  Fertility management,  Ovarian health and possible hormonal treatment options.  Endocrinology provider who manages possible insulin resistance and pre diabetic concerns, as well as the hormonal concerns.  Medi weight loss team for coaching, weight loss strategies, supplements and nutritional counseling.  Walk and talk therapy- possible behavorial modifications, possible increased risks for anxiety and depression  Dermatology referrals for possible skin conditions, acne and hirsutism  Fertility treatments with [facility].   for       Spent 45 min discussion care and management    Patient was counseled  today on A.C.S. Pap guidelines and recommendations for yearly pelvic exams, mammograms and monthly self breast exams; to see her PCP for other health maintenance.     Follow up in about 4 weeks (around 12/17/2019).  In PCOS clinic to review the testing and for PAP/ WWE

## 2019-11-20 ENCOUNTER — TELEPHONE (OUTPATIENT)
Dept: OBSTETRICS AND GYNECOLOGY | Facility: HOSPITAL | Age: 34
End: 2019-11-20

## 2019-11-20 DIAGNOSIS — N92.6 IRREGULAR BLEEDING: Primary | ICD-10-CM

## 2019-11-22 LAB — TESTOST FREE SERPL-MCNC: 0.8 PG/ML

## 2020-01-08 ENCOUNTER — OFFICE VISIT (OUTPATIENT)
Dept: FAMILY MEDICINE | Facility: CLINIC | Age: 35
End: 2020-01-08
Payer: COMMERCIAL

## 2020-01-08 VITALS
OXYGEN SATURATION: 98 % | RESPIRATION RATE: 16 BRPM | HEIGHT: 61 IN | SYSTOLIC BLOOD PRESSURE: 128 MMHG | BODY MASS INDEX: 34.67 KG/M2 | HEART RATE: 97 BPM | DIASTOLIC BLOOD PRESSURE: 62 MMHG | TEMPERATURE: 99 F | WEIGHT: 183.63 LBS

## 2020-01-08 DIAGNOSIS — F39 MOOD DISORDER: ICD-10-CM

## 2020-01-08 DIAGNOSIS — Z23 NEED FOR PNEUMOCOCCAL VACCINATION: Primary | ICD-10-CM

## 2020-01-08 PROCEDURE — 99999 PR PBB SHADOW E&M-EST. PATIENT-LVL III: CPT | Mod: PBBFAC,,, | Performed by: FAMILY MEDICINE

## 2020-01-08 PROCEDURE — 3008F PR BODY MASS INDEX (BMI) DOCUMENTED: ICD-10-PCS | Mod: CPTII,S$GLB,, | Performed by: FAMILY MEDICINE

## 2020-01-08 PROCEDURE — 99213 PR OFFICE/OUTPT VISIT, EST, LEVL III, 20-29 MIN: ICD-10-PCS | Mod: S$GLB,,, | Performed by: FAMILY MEDICINE

## 2020-01-08 PROCEDURE — 3008F BODY MASS INDEX DOCD: CPT | Mod: CPTII,S$GLB,, | Performed by: FAMILY MEDICINE

## 2020-01-08 PROCEDURE — 99999 PR PBB SHADOW E&M-EST. PATIENT-LVL III: ICD-10-PCS | Mod: PBBFAC,,, | Performed by: FAMILY MEDICINE

## 2020-01-08 PROCEDURE — 99213 OFFICE O/P EST LOW 20 MIN: CPT | Mod: S$GLB,,, | Performed by: FAMILY MEDICINE

## 2020-01-08 RX ORDER — DESVENLAFAXINE 100 MG/1
200 TABLET, EXTENDED RELEASE ORAL DAILY
Qty: 60 TABLET | Refills: 5 | Status: SHIPPED | OUTPATIENT
Start: 2020-01-08 | End: 2020-01-08 | Stop reason: SDUPTHER

## 2020-01-08 RX ORDER — DESVENLAFAXINE 100 MG/1
200 TABLET, EXTENDED RELEASE ORAL DAILY
Qty: 60 TABLET | Refills: 0 | Status: SHIPPED | OUTPATIENT
Start: 2020-01-08 | End: 2020-02-10

## 2020-01-08 NOTE — PROGRESS NOTES
Health Maintenance Due   Topic     Pneumococcal Vaccine (Medium Risk) (1 of 1 - PPSV23) Ok to get today

## 2020-01-08 NOTE — PROGRESS NOTES
Subjective:       Patient ID: Lauren K Damico is a 34 y.o. female.    Chief Complaint: Mood Disorder    34 year old female presents for refills of her medication. She states her job is stressful and it has helped a lot on t his medication. She states the xanax helps her sleep when she is not home and then as needed. She states it works so well she will pay out of pocket for this if she needs it. [place] estates with he effexor she would binge on food and sleep. She states now she can come home and workout.     Past Medical History:  9/19/12: Abnormal Pap smear      Comment:  colpo/LEEP  No date: Anemia  No date: Anxiety  No date: PCOS (polycystic ovarian syndrome)   Past Surgical History:  10-17-12: CERVICAL BIOPSY  W/ LOOP ELECTRODE EXCISION  No date: GALLBLADDER SURGERY  No date: GASTRIC RESTRICTION SURGERY      Comment:  gastric sleeve in 2018  No date: WISDOM TOOTH EXTRACTION  Review of patient's family history indicates:  Problem: Ovarian cancer      Relation: Paternal Grandmother          Age of Onset: (Not Specified)  Problem: Breast cancer      Relation: Paternal Aunt          Age of Onset: (Not Specified)  Problem: Ovarian cancer      Relation: Paternal Aunt          Age of Onset: (Not Specified)  Problem: Breast cancer      Relation: Paternal Aunt          Age of Onset: (Not Specified)  Problem: Uterine cancer      Relation: Paternal Aunt          Age of Onset: (Not Specified)  Problem: Colon cancer      Relation: Neg Hx          Age of Onset: (Not Specified)    Social History    Socioeconomic History      Marital status:       Spouse name: Not on file      Number of children: Not on file      Years of education: Not on file      Highest education level: Not on file    Occupational History      Not on file    Social Needs      Financial resource strain: Not hard at all      Food insecurity:        Worry: Never true        Inability: Never true      Transportation needs:        Medical: No         "Non-medical: No    Tobacco Use      Smoking status: Never Smoker      Smokeless tobacco: Never Used    Substance and Sexual Activity      Alcohol use: Yes        Alcohol/week: 1.0 standard drinks        Types: 1 Glasses of wine per week        Frequency: 2-3 times a week        Drinks per session: 1 or 2        Binge frequency: Less than monthly      Drug use: No      Sexual activity: Yes        Partners: Male        Birth control/protection: None    Lifestyle      Physical activity:        Days per week: 3 days        Minutes per session: 30 min      Stress: Very much    Relationships      Social connections:        Talks on phone: More than three times a week        Gets together: More than three times a week        Attends Jehov[employer]'s witness service: Not on file        Active member of club or organization: No        Attends meetings of clubs or organizations: Never        Relationship status:     Other Topics      Concerns:        Not on file    Social History Narrative      Not on file        Review of Systems     Objective:       Vitals:    01/08/20 1516   BP: 128/62   Pulse: 97   Resp: 16   Temp: 98.6 °F (37 °C)   TempSrc: Oral   SpO2: 98%   Weight: 83.3 kg (183 lb 10.3 oz)   Height: 5' 1" (1.549 m)       Physical Exam   Constitutional: She is oriented to person, place, and time. She appears well-developed and well-nourished. No distress.   HENT:   Head: Normocephalic and atraumatic.   Neck: Normal range of motion. Neck supple.   Cardiovascular: Normal rate, regular rhythm and normal heart sounds. Exam reveals no gallop and no friction rub.   No murmur heard.  Pulmonary/Chest: Effort normal and breath sounds normal. No respiratory distress. She has no wheezes. She has no rales.   Neurological: She is alert and oriented to person, place, and time.   Skin: She is not diaphoretic.   Psychiatric: She has a normal mood and affect.       Assessment:       1. Need for pneumococcal vaccination    2. Mood disorder      "   Plan:       Darby was seen today for mood disorder.    Diagnoses and all orders for this visit:    Need for pneumococcal vaccination  -     Pneumococcal Polysaccharide Vaccine (23 Valent) (SQ/IM)     Mood disorder  -     desvenlafaxine succinate (PRISTIQ) 100 MG Tb24; Take 2 tablets (200 mg total) by mouth once daily.  Stable. Refilled meds.   Given a 1 month prescription to [facility] and given the refills to [facility] as she is out now.

## 2020-01-10 RX ORDER — ALPRAZOLAM 0.5 MG/1
TABLET ORAL
Qty: 30 TABLET | Refills: 2 | Status: SHIPPED | OUTPATIENT
Start: 2020-01-10 | End: 2020-08-28

## 2020-02-07 DIAGNOSIS — F39 MOOD DISORDER: ICD-10-CM

## 2020-02-10 RX ORDER — DESVENLAFAXINE 100 MG/1
TABLET, EXTENDED RELEASE ORAL
Qty: 60 TABLET | Refills: 0 | Status: SHIPPED | OUTPATIENT
Start: 2020-02-10 | End: 2020-03-11

## 2020-03-11 DIAGNOSIS — F39 MOOD DISORDER: ICD-10-CM

## 2020-03-11 RX ORDER — DESVENLAFAXINE 100 MG/1
TABLET, EXTENDED RELEASE ORAL
Qty: 60 TABLET | Refills: 5 | Status: SHIPPED | OUTPATIENT
Start: 2020-03-11 | End: 2020-08-28

## 2020-03-24 ENCOUNTER — PATIENT OUTREACH (OUTPATIENT)
Dept: ADMINISTRATIVE | Facility: OTHER | Age: 35
End: 2020-03-24

## 2020-03-24 ENCOUNTER — OFFICE VISIT (OUTPATIENT)
Dept: OBSTETRICS AND GYNECOLOGY | Facility: CLINIC | Age: 35
End: 2020-03-24
Payer: COMMERCIAL

## 2020-03-24 ENCOUNTER — TELEPHONE (OUTPATIENT)
Dept: OBSTETRICS AND GYNECOLOGY | Facility: CLINIC | Age: 35
End: 2020-03-24

## 2020-03-24 DIAGNOSIS — Z32.01 POSITIVE PREGNANCY TEST: Primary | ICD-10-CM

## 2020-03-24 PROCEDURE — 99214 PR OFFICE/OUTPT VISIT, EST, LEVL IV, 30-39 MIN: ICD-10-PCS | Mod: 95,,, | Performed by: OBSTETRICS & GYNECOLOGY

## 2020-03-24 PROCEDURE — 99214 OFFICE O/P EST MOD 30 MIN: CPT | Mod: 95,,, | Performed by: OBSTETRICS & GYNECOLOGY

## 2020-03-24 NOTE — TELEPHONE ENCOUNTER
Please schedule labs- bhcg and progesterone tomorrow while she is working at [employer]. Will follow up after to see if she needs the US this Friday AP

## 2020-03-24 NOTE — TELEPHONE ENCOUNTER
----- Message from Umberto Yates sent at 3/23/2020  5:02 PM CDT -----  Contact: [phone]/ self   Patient requesting to speak with you regarding getting a appointment sooner than the next available scheduled because she took two pregnancy test that came back positive. Please advise.

## 2020-03-24 NOTE — TELEPHONE ENCOUNTER
Patient's appointment has been changed to a virtual visit for today. Patient has also been scheduled for her dating ultrasound. Patient verbalized understanding.

## 2020-03-24 NOTE — PROGRESS NOTES
The patient location is: HOME  The chief complaint leading to consultation is: positive pregnancy test and medication concerns  Visit type: Virtual visit with synchronous audio and video  Total time spent with patient: 20 min  Each patient to whom he or she provides medical services by telemedicine is:  (1) informed of the relationship between the physician and patient and the respective role of any other health care provider with respect to management of the patient; and (2) notified that he or she may decline to receive medical services by telemedicine and may withdraw from such care at any time.  CC: pos preg test    Lauren K Damico is a 34 y.o. female  presents for a post preg test  Negative UPT end of Feb and it was negative  Yesterday  She had positive tests yesterday X 2  No bleeding or cramping or pain.  She denies any pain or concerns  Minimal SE    Past Medical History:   Diagnosis Date    Abnormal Pap smear 12    colpo/LEEP    Anemia     Anxiety     PCOS (polycystic ovarian syndrome)        Past Surgical History:   Procedure Laterality Date    CERVICAL BIOPSY  W/ LOOP ELECTRODE EXCISION  10-17-12    GALLBLADDER SURGERY      GASTRIC RESTRICTION SURGERY      gastric sleeve in 2018    WISDOM TOOTH EXTRACTION         OB History    Para Term  AB Living   0             SAB TAB Ectopic Multiple Live Births                   Family History   Problem Relation Age of Onset    Ovarian cancer Paternal Grandmother     Breast cancer Paternal Aunt     Ovarian cancer Paternal Aunt     Breast cancer Paternal Aunt     Uterine cancer Paternal Aunt     Colon cancer Neg Hx        Social History     Tobacco Use    Smoking status: Never Smoker    Smokeless tobacco: Never Used   Substance Use Topics    Alcohol use: Yes     Alcohol/week: 1.0 standard drinks     Types: 1 Glasses of wine per week     Frequency: 2-3 times a week     Drinks per session: 1 or 2     Binge frequency: Less than  monthly    Drug use: No       There were no vitals taken for this visit.    ROS:  GENERAL: Denies weight gain or weight loss. Feeling well overall.   SKIN: Denies rash or lesions.   HEAD: Denies head injury or headache.   NODES: Denies enlarged lymph nodes.   CHEST: Denies chest pain or shortness of breath.   CARDIOVASCULAR: Denies palpitations or left sided chest pain.   ABDOMEN: No abdominal pain, constipation, diarrhea, nausea, vomiting or rectal bleeding.   URINARY: No frequency, dysuria, hematuria, or burning on urination.  REPRODUCTIVE: See HPI.   BREASTS: The patient performs breast self-examination and denies pain, lumps, or nipple discharge.   HEMATOLOGIC: No easy bruisability or excessive bleeding.  MUSCULOSKELETAL: Denies joint pain or swelling.   NEUROLOGIC: Denies syncope or weakness.   PSYCHIATRIC: Denies depression, anxiety or mood swings.    Physical Exam:    APPEARANCE: Well nourished, well developed, in no acute distress.  AFFECT: WNL, alert and oriented x 3  SKIN: No acne or hirsutism  NECK: Neck symmetric without masses or thyromegaly  NODES: No inguinal, cervical, axillary, or femoral lymph node enlargement  derness.    EXTREMITIES: No edema.    ASSESSMENT AND PLAN  1. Positive pregnancy test  hCG, quantitative    Progesterone    Post Partum Type and Screen     Dicussed possible pregnancy dates  PNC   Will speak with her medical professional about weaning down on the prestiq. Risks and benefits discussed  Do not stop quickly. I would recommend weaning down    Patient was counseled today on bleeding and pain precautions  Will consider US appt once we follow up on labs

## 2020-03-25 ENCOUNTER — LAB VISIT (OUTPATIENT)
Dept: LAB | Facility: HOSPITAL | Age: 35
End: 2020-03-25
Attending: OBSTETRICS & GYNECOLOGY
Payer: COMMERCIAL

## 2020-03-25 DIAGNOSIS — Z32.01 POSITIVE PREGNANCY TEST: ICD-10-CM

## 2020-03-25 LAB
ABO + RH BLD: NORMAL
BLD GP AB SCN CELLS X3 SERPL QL: NORMAL
HCG INTACT+B SERPL-ACNC: NORMAL MIU/ML
PROGEST SERPL-MCNC: 13.4 NG/ML

## 2020-03-25 PROCEDURE — 86850 RBC ANTIBODY SCREEN: CPT

## 2020-03-25 PROCEDURE — 84702 CHORIONIC GONADOTROPIN TEST: CPT

## 2020-03-25 PROCEDURE — 84144 ASSAY OF PROGESTERONE: CPT

## 2020-03-26 ENCOUNTER — PATIENT MESSAGE (OUTPATIENT)
Dept: OBSTETRICS AND GYNECOLOGY | Facility: CLINIC | Age: 35
End: 2020-03-26

## 2020-03-26 ENCOUNTER — TELEPHONE (OUTPATIENT)
Dept: OBSTETRICS AND GYNECOLOGY | Facility: CLINIC | Age: 35
End: 2020-03-26

## 2020-03-26 DIAGNOSIS — Z32.01 POSITIVE PREGNANCY TEST: Primary | ICD-10-CM

## 2020-03-27 ENCOUNTER — CLINICAL SUPPORT (OUTPATIENT)
Dept: OBSTETRICS AND GYNECOLOGY | Facility: CLINIC | Age: 35
End: 2020-03-27
Payer: COMMERCIAL

## 2020-03-27 ENCOUNTER — PROCEDURE VISIT (OUTPATIENT)
Dept: OBSTETRICS AND GYNECOLOGY | Facility: CLINIC | Age: 35
End: 2020-03-27
Payer: COMMERCIAL

## 2020-03-27 DIAGNOSIS — Z32.01 POSITIVE PREGNANCY TEST: Primary | ICD-10-CM

## 2020-03-27 DIAGNOSIS — N91.2 AMENORRHEA: ICD-10-CM

## 2020-03-27 DIAGNOSIS — Z32.01 POSITIVE PREGNANCY TEST: ICD-10-CM

## 2020-03-27 PROCEDURE — 76801 PR US, OB <14WKS, TRANSABD, SINGLE GESTATION: ICD-10-PCS | Mod: S$GLB,,, | Performed by: OBSTETRICS & GYNECOLOGY

## 2020-03-27 PROCEDURE — 99999 PR PBB SHADOW E&M-EST. PATIENT-LVL I: CPT | Mod: PBBFAC,,, | Performed by: OBSTETRICS & GYNECOLOGY

## 2020-03-27 PROCEDURE — 76801 OB US < 14 WKS SINGLE FETUS: CPT | Mod: S$GLB,,, | Performed by: OBSTETRICS & GYNECOLOGY

## 2020-03-27 PROCEDURE — 87086 URINE CULTURE/COLONY COUNT: CPT

## 2020-03-27 PROCEDURE — 99999 PR PBB SHADOW E&M-EST. PATIENT-LVL I: ICD-10-PCS | Mod: PBBFAC,,, | Performed by: OBSTETRICS & GYNECOLOGY

## 2020-03-27 RX ORDER — PROGESTERONE 200 MG/1
200 CAPSULE ORAL NIGHTLY
Qty: 30 CAPSULE | Refills: 11 | Status: SHIPPED | OUTPATIENT
Start: 2020-03-27 | End: 2020-12-03

## 2020-03-27 NOTE — TELEPHONE ENCOUNTER
Your pregnancy hormones are consistant with an early pregnancy. I will send in prometrium 2oo mg nightly to support the pregnancy AP

## 2020-03-29 LAB — BACTERIA UR CULT: NO GROWTH

## 2020-04-02 ENCOUNTER — TELEPHONE (OUTPATIENT)
Dept: OBSTETRICS AND GYNECOLOGY | Facility: CLINIC | Age: 35
End: 2020-04-02

## 2020-04-02 DIAGNOSIS — Z32.01 POSITIVE PREGNANCY TEST: Primary | ICD-10-CM

## 2020-04-06 ENCOUNTER — TELEPHONE (OUTPATIENT)
Dept: OBSTETRICS AND GYNECOLOGY | Facility: CLINIC | Age: 35
End: 2020-04-06

## 2020-04-06 ENCOUNTER — PATIENT MESSAGE (OUTPATIENT)
Dept: OBSTETRICS AND GYNECOLOGY | Facility: CLINIC | Age: 35
End: 2020-04-06

## 2020-04-06 NOTE — TELEPHONE ENCOUNTER
Patient is stated that she is having trouble sleeping due to anxiety. Has a prescription for Xanax but is unsure if she can take it in early pregnancy. Patient would like a different prescription if she cannot take xanax.

## 2020-04-13 ENCOUNTER — PATIENT MESSAGE (OUTPATIENT)
Dept: OBSTETRICS AND GYNECOLOGY | Facility: CLINIC | Age: 35
End: 2020-04-13

## 2020-04-13 ENCOUNTER — PROCEDURE VISIT (OUTPATIENT)
Dept: OBSTETRICS AND GYNECOLOGY | Facility: CLINIC | Age: 35
End: 2020-04-13
Payer: COMMERCIAL

## 2020-04-13 DIAGNOSIS — Z32.01 POSITIVE PREGNANCY TEST: ICD-10-CM

## 2020-04-13 PROCEDURE — 76801 OB US < 14 WKS SINGLE FETUS: CPT | Mod: S$GLB,,, | Performed by: OBSTETRICS & GYNECOLOGY

## 2020-04-13 PROCEDURE — 76801 PR US, OB <14WKS, TRANSABD, SINGLE GESTATION: ICD-10-PCS | Mod: S$GLB,,, | Performed by: OBSTETRICS & GYNECOLOGY

## 2020-04-13 RX ORDER — ONDANSETRON 4 MG/1
4 TABLET, FILM COATED ORAL 2 TIMES DAILY
Qty: 60 TABLET | Refills: 1 | Status: SHIPPED | OUTPATIENT
Start: 2020-04-13 | End: 2020-12-03

## 2020-04-21 DIAGNOSIS — Z01.84 ANTIBODY RESPONSE EXAMINATION: ICD-10-CM

## 2020-05-21 DIAGNOSIS — Z01.84 ANTIBODY RESPONSE EXAMINATION: ICD-10-CM

## 2020-06-18 ENCOUNTER — TELEPHONE (OUTPATIENT)
Dept: OBSTETRICS AND GYNECOLOGY | Facility: CLINIC | Age: 35
End: 2020-06-18

## 2020-06-18 NOTE — TELEPHONE ENCOUNTER
----- Message from Shelbi Thompson sent at 6/18/2020  3:21 PM CDT -----  Pt is wondering when her appointments will be set.     Pt call back- [phone]

## 2020-06-20 DIAGNOSIS — Z01.84 ANTIBODY RESPONSE EXAMINATION: ICD-10-CM

## 2020-06-22 ENCOUNTER — PATIENT OUTREACH (OUTPATIENT)
Dept: ADMINISTRATIVE | Facility: OTHER | Age: 35
End: 2020-06-22

## 2020-06-22 NOTE — PROGRESS NOTES
Care Everywhere:   Immunization: updated  Health Maintenance: updated  Media Review:   Legacy Review:   Order placed:   Upcoming appts:

## 2020-06-23 ENCOUNTER — INITIAL PRENATAL (OUTPATIENT)
Dept: OBSTETRICS AND GYNECOLOGY | Facility: CLINIC | Age: 35
End: 2020-06-23
Payer: COMMERCIAL

## 2020-06-23 ENCOUNTER — LAB VISIT (OUTPATIENT)
Dept: LAB | Facility: HOSPITAL | Age: 35
End: 2020-06-23
Attending: OBSTETRICS & GYNECOLOGY
Payer: COMMERCIAL

## 2020-06-23 VITALS
SYSTOLIC BLOOD PRESSURE: 112 MMHG | DIASTOLIC BLOOD PRESSURE: 70 MMHG | WEIGHT: 193.25 LBS | BODY MASS INDEX: 36.51 KG/M2

## 2020-06-23 DIAGNOSIS — Z3A.18 18 WEEKS GESTATION OF PREGNANCY: Primary | ICD-10-CM

## 2020-06-23 DIAGNOSIS — Z3A.18 18 WEEKS GESTATION OF PREGNANCY: ICD-10-CM

## 2020-06-23 LAB
ABO + RH BLD: NORMAL
ANION GAP SERPL CALC-SCNC: 7 MMOL/L (ref 8–16)
BASOPHILS # BLD AUTO: 0.04 K/UL (ref 0–0.2)
BASOPHILS NFR BLD: 0.4 % (ref 0–1.9)
BLD GP AB SCN CELLS X3 SERPL QL: NORMAL
BUN SERPL-MCNC: 7 MG/DL (ref 6–20)
CALCIUM SERPL-MCNC: 8.8 MG/DL (ref 8.7–10.5)
CHLORIDE SERPL-SCNC: 104 MMOL/L (ref 95–110)
CO2 SERPL-SCNC: 23 MMOL/L (ref 23–29)
CREAT SERPL-MCNC: 0.6 MG/DL (ref 0.5–1.4)
DIFFERENTIAL METHOD: ABNORMAL
EOSINOPHIL # BLD AUTO: 0.1 K/UL (ref 0–0.5)
EOSINOPHIL NFR BLD: 1.2 % (ref 0–8)
ERYTHROCYTE [DISTWIDTH] IN BLOOD BY AUTOMATED COUNT: 18.9 % (ref 11.5–14.5)
EST. GFR  (AFRICAN AMERICAN): >60 ML/MIN/1.73 M^2
EST. GFR  (NON AFRICAN AMERICAN): >60 ML/MIN/1.73 M^2
GLUCOSE SERPL-MCNC: 95 MG/DL (ref 70–110)
HCT VFR BLD AUTO: 25.1 % (ref 37–48.5)
HGB BLD-MCNC: 6.9 G/DL (ref 12–16)
IMM GRANULOCYTES # BLD AUTO: 0.07 K/UL (ref 0–0.04)
IMM GRANULOCYTES NFR BLD AUTO: 0.7 % (ref 0–0.5)
LYMPHOCYTES # BLD AUTO: 2.1 K/UL (ref 1–4.8)
LYMPHOCYTES NFR BLD: 21.6 % (ref 18–48)
MCH RBC QN AUTO: 18.6 PG (ref 27–31)
MCHC RBC AUTO-ENTMCNC: 27.5 G/DL (ref 32–36)
MCV RBC AUTO: 68 FL (ref 82–98)
MONOCYTES # BLD AUTO: 0.7 K/UL (ref 0.3–1)
MONOCYTES NFR BLD: 7.2 % (ref 4–15)
NEUTROPHILS # BLD AUTO: 6.7 K/UL (ref 1.8–7.7)
NEUTROPHILS NFR BLD: 68.9 % (ref 38–73)
NRBC BLD-RTO: 0 /100 WBC
PLATELET # BLD AUTO: 489 K/UL (ref 150–350)
PMV BLD AUTO: 9.4 FL (ref 9.2–12.9)
POTASSIUM SERPL-SCNC: 4 MMOL/L (ref 3.5–5.1)
RBC # BLD AUTO: 3.7 M/UL (ref 4–5.4)
SODIUM SERPL-SCNC: 134 MMOL/L (ref 136–145)
WBC # BLD AUTO: 9.74 K/UL (ref 3.9–12.7)

## 2020-06-23 PROCEDURE — 36415 COLL VENOUS BLD VENIPUNCTURE: CPT

## 2020-06-23 PROCEDURE — 87491 CHLMYD TRACH DNA AMP PROBE: CPT

## 2020-06-23 PROCEDURE — 99999 PR PBB SHADOW E&M-EST. PATIENT-LVL III: ICD-10-PCS | Mod: PBBFAC,,, | Performed by: OBSTETRICS & GYNECOLOGY

## 2020-06-23 PROCEDURE — 86901 BLOOD TYPING SEROLOGIC RH(D): CPT

## 2020-06-23 PROCEDURE — 80048 BASIC METABOLIC PNL TOTAL CA: CPT

## 2020-06-23 PROCEDURE — 85025 COMPLETE CBC W/AUTO DIFF WBC: CPT

## 2020-06-23 PROCEDURE — 0502F SUBSEQUENT PRENATAL CARE: CPT | Mod: CPTII,S$GLB,, | Performed by: OBSTETRICS & GYNECOLOGY

## 2020-06-23 PROCEDURE — 87340 HEPATITIS B SURFACE AG IA: CPT

## 2020-06-23 PROCEDURE — 81511 FTL CGEN ABNOR FOUR ANAL: CPT

## 2020-06-23 PROCEDURE — 99999 PR PBB SHADOW E&M-EST. PATIENT-LVL III: CPT | Mod: PBBFAC,,, | Performed by: OBSTETRICS & GYNECOLOGY

## 2020-06-23 PROCEDURE — 87086 URINE CULTURE/COLONY COUNT: CPT

## 2020-06-23 PROCEDURE — 86592 SYPHILIS TEST NON-TREP QUAL: CPT

## 2020-06-23 PROCEDURE — 86762 RUBELLA ANTIBODY: CPT

## 2020-06-23 PROCEDURE — 0502F PR SUBSEQUENT PRENATAL CARE: ICD-10-PCS | Mod: CPTII,S$GLB,, | Performed by: OBSTETRICS & GYNECOLOGY

## 2020-06-23 PROCEDURE — 86703 HIV-1/HIV-2 1 RESULT ANTBDY: CPT

## 2020-06-23 NOTE — PROGRESS NOTES
HERE for routine OB visit at 18  1/7 wks, with occ round ligament pain.  Denies vaginal bleeding, cramping/ ctx, or LOF.  Would like quad screen.  MFM scan ordered. ALL PNL ordered.  Has family history of chondroplasia in a sibling who past two days after birth.  F/U in four weeks

## 2020-06-24 LAB
BACTERIA UR CULT: NORMAL
BACTERIA UR CULT: NORMAL
HBV SURFACE AG SERPL QL IA: NEGATIVE
HIV 1+2 AB+HIV1 P24 AG SERPL QL IA: NEGATIVE
RPR SER QL: NORMAL
RUBV IGG SER-ACNC: 20.7 IU/ML
RUBV IGG SER-IMP: REACTIVE

## 2020-06-25 ENCOUNTER — PATIENT MESSAGE (OUTPATIENT)
Dept: OBSTETRICS AND GYNECOLOGY | Facility: CLINIC | Age: 35
End: 2020-06-25

## 2020-06-25 DIAGNOSIS — D64.9 SEVERE ANEMIA: Primary | ICD-10-CM

## 2020-06-25 LAB
C TRACH DNA SPEC QL NAA+PROBE: NOT DETECTED
N GONORRHOEA DNA SPEC QL NAA+PROBE: NOT DETECTED

## 2020-06-26 DIAGNOSIS — D50.9 MICROCYTIC ANEMIA: Primary | ICD-10-CM

## 2020-06-28 ENCOUNTER — TELEPHONE (OUTPATIENT)
Dept: OBSTETRICS AND GYNECOLOGY | Facility: CLINIC | Age: 35
End: 2020-06-28

## 2020-06-29 ENCOUNTER — LAB VISIT (OUTPATIENT)
Dept: LAB | Facility: HOSPITAL | Age: 35
End: 2020-06-29
Attending: FAMILY MEDICINE
Payer: COMMERCIAL

## 2020-06-29 DIAGNOSIS — D50.9 MICROCYTIC ANEMIA: ICD-10-CM

## 2020-06-29 LAB
ALBUMIN SERPL BCP-MCNC: 2.7 G/DL (ref 3.5–5.2)
ALP SERPL-CCNC: 72 U/L (ref 55–135)
ALT SERPL W/O P-5'-P-CCNC: 10 U/L (ref 10–44)
ANION GAP SERPL CALC-SCNC: 7 MMOL/L (ref 8–16)
AST SERPL-CCNC: 10 U/L (ref 10–40)
BASOPHILS # BLD AUTO: 0.04 K/UL (ref 0–0.2)
BASOPHILS NFR BLD: 0.4 % (ref 0–1.9)
BILIRUB SERPL-MCNC: 0.1 MG/DL (ref 0.1–1)
BUN SERPL-MCNC: 8 MG/DL (ref 6–20)
CALCIUM SERPL-MCNC: 8.7 MG/DL (ref 8.7–10.5)
CHLORIDE SERPL-SCNC: 105 MMOL/L (ref 95–110)
CO2 SERPL-SCNC: 23 MMOL/L (ref 23–29)
CREAT SERPL-MCNC: 0.6 MG/DL (ref 0.5–1.4)
DIFFERENTIAL METHOD: ABNORMAL
EOSINOPHIL # BLD AUTO: 0.1 K/UL (ref 0–0.5)
EOSINOPHIL NFR BLD: 1.3 % (ref 0–8)
ERYTHROCYTE [DISTWIDTH] IN BLOOD BY AUTOMATED COUNT: 18.8 % (ref 11.5–14.5)
EST. GFR  (AFRICAN AMERICAN): >60 ML/MIN/1.73 M^2
EST. GFR  (NON AFRICAN AMERICAN): >60 ML/MIN/1.73 M^2
FERRITIN SERPL-MCNC: 3 NG/ML (ref 20–300)
FOLATE SERPL-MCNC: 12.4 NG/ML (ref 4–24)
GLUCOSE SERPL-MCNC: 135 MG/DL (ref 70–110)
HCT VFR BLD AUTO: 24.6 % (ref 37–48.5)
HGB BLD-MCNC: 6.7 G/DL (ref 12–16)
IMM GRANULOCYTES # BLD AUTO: 0.04 K/UL (ref 0–0.04)
IMM GRANULOCYTES NFR BLD AUTO: 0.4 % (ref 0–0.5)
IRON SERPL-MCNC: 13 UG/DL (ref 30–160)
LYMPHOCYTES # BLD AUTO: 1.9 K/UL (ref 1–4.8)
LYMPHOCYTES NFR BLD: 20.4 % (ref 18–48)
MCH RBC QN AUTO: 18.6 PG (ref 27–31)
MCHC RBC AUTO-ENTMCNC: 27.2 G/DL (ref 32–36)
MCV RBC AUTO: 68 FL (ref 82–98)
MONOCYTES # BLD AUTO: 0.6 K/UL (ref 0.3–1)
MONOCYTES NFR BLD: 6.4 % (ref 4–15)
NEUTROPHILS # BLD AUTO: 6.7 K/UL (ref 1.8–7.7)
NEUTROPHILS NFR BLD: 71.1 % (ref 38–73)
NRBC BLD-RTO: 0 /100 WBC
PLATELET # BLD AUTO: 450 K/UL (ref 150–350)
PMV BLD AUTO: 9.1 FL (ref 9.2–12.9)
POTASSIUM SERPL-SCNC: 3.7 MMOL/L (ref 3.5–5.1)
PROT SERPL-MCNC: 6.5 G/DL (ref 6–8.4)
RBC # BLD AUTO: 3.6 M/UL (ref 4–5.4)
SATURATED IRON: 3 % (ref 20–50)
SODIUM SERPL-SCNC: 135 MMOL/L (ref 136–145)
TOTAL IRON BINDING CAPACITY: 484 UG/DL (ref 250–450)
TRANSFERRIN SERPL-MCNC: 327 MG/DL (ref 200–375)
WBC # BLD AUTO: 9.41 K/UL (ref 3.9–12.7)

## 2020-06-29 PROCEDURE — 82728 ASSAY OF FERRITIN: CPT

## 2020-06-29 PROCEDURE — 82746 ASSAY OF FOLIC ACID SERUM: CPT

## 2020-06-29 PROCEDURE — 83540 ASSAY OF IRON: CPT

## 2020-06-29 PROCEDURE — 80053 COMPREHEN METABOLIC PANEL: CPT

## 2020-06-29 PROCEDURE — 36415 COLL VENOUS BLD VENIPUNCTURE: CPT

## 2020-06-29 PROCEDURE — 85025 COMPLETE CBC W/AUTO DIFF WBC: CPT

## 2020-06-29 PROCEDURE — 83020 HEMOGLOBIN ELECTROPHORESIS: CPT

## 2020-06-29 NOTE — TELEPHONE ENCOUNTER
The quad screen information was put in incorrectly. She is 18 1/7 weeks but the paperwork shows 27 wks.    Please correct the information AP

## 2020-06-30 ENCOUNTER — PROCEDURE VISIT (OUTPATIENT)
Dept: MATERNAL FETAL MEDICINE | Facility: CLINIC | Age: 35
End: 2020-06-30
Payer: COMMERCIAL

## 2020-06-30 DIAGNOSIS — O99.210 MATERNAL OBESITY AFFECTING PREGNANCY, ANTEPARTUM: ICD-10-CM

## 2020-06-30 DIAGNOSIS — Z3A.18 18 WEEKS GESTATION OF PREGNANCY: ICD-10-CM

## 2020-06-30 DIAGNOSIS — Z36.89 ENCOUNTER FOR FETAL ANATOMIC SURVEY: ICD-10-CM

## 2020-06-30 DIAGNOSIS — Z36.89 ENCOUNTER FOR ULTRASOUND TO ASSESS FETAL GROWTH: ICD-10-CM

## 2020-06-30 DIAGNOSIS — D50.9 MICROCYTIC ANEMIA: Primary | ICD-10-CM

## 2020-06-30 LAB
# FETUSES US: NORMAL
2ND TRIMESTER 4 SCREEN PNL SERPL: NEGATIVE
2ND TRIMESTER 4 SCREEN SERPL-IMP: NORMAL
AFP MOM SERPL: 0.74
AFP SERPL-MCNC: 30.2 NG/ML
AGE AT DELIVERY: 33
B-HCG MOM SERPL: 0.58
B-HCG SERPL-ACNC: 14.1 IU/ML
FET TS 21 RISK FROM MAT AGE: NORMAL
GA (DAYS): 6 D
GA (WEEKS): 18 WK
GA METHOD: NORMAL
IDDM PATIENT QL: NORMAL
INHIBIN A MOM SERPL: 0.72
INHIBIN A SERPL-MCNC: 106.9 PG/ML
SMOKING STATUS FTND: NO
TS 18 RISK FETUS: NORMAL
TS 21 RISK FETUS: NORMAL
U ESTRIOL MOM SERPL: 0.52
U ESTRIOL SERPL-MCNC: 0.86 NG/ML

## 2020-06-30 PROCEDURE — 76811 PR US, OB FETAL EVAL & EXAM, TRANSABDOM,FIRST GESTATION: ICD-10-PCS | Mod: S$GLB,,, | Performed by: OBSTETRICS & GYNECOLOGY

## 2020-06-30 PROCEDURE — 76811 OB US DETAILED SNGL FETUS: CPT | Mod: S$GLB,,, | Performed by: OBSTETRICS & GYNECOLOGY

## 2020-06-30 RX ORDER — HYDROCODONE BITARTRATE AND ACETAMINOPHEN 500; 5 MG/1; MG/1
TABLET ORAL ONCE
Status: CANCELLED | OUTPATIENT
Start: 2020-06-30 | End: 2020-06-30

## 2020-07-01 ENCOUNTER — INFUSION (OUTPATIENT)
Dept: INFUSION THERAPY | Facility: HOSPITAL | Age: 35
End: 2020-07-01
Attending: INTERNAL MEDICINE
Payer: COMMERCIAL

## 2020-07-01 ENCOUNTER — LAB VISIT (OUTPATIENT)
Dept: LAB | Facility: HOSPITAL | Age: 35
End: 2020-07-01
Payer: COMMERCIAL

## 2020-07-01 VITALS
TEMPERATURE: 98 F | RESPIRATION RATE: 18 BRPM | DIASTOLIC BLOOD PRESSURE: 58 MMHG | HEART RATE: 96 BPM | OXYGEN SATURATION: 100 % | SYSTOLIC BLOOD PRESSURE: 106 MMHG

## 2020-07-01 DIAGNOSIS — D50.9 MICROCYTIC ANEMIA: ICD-10-CM

## 2020-07-01 LAB
ABO + RH BLD: NORMAL
BLD GP AB SCN CELLS X3 SERPL QL: NORMAL
BLD PROD TYP BPU: NORMAL
BLOOD UNIT EXPIRATION DATE: NORMAL
BLOOD UNIT TYPE CODE: 5100
BLOOD UNIT TYPE: NORMAL
CODING SYSTEM: NORMAL
DISPENSE STATUS: NORMAL
HGB A2 MFR BLD HPLC: 2 % (ref 2.2–3.2)
HGB FRACT BLD ELPH-IMP: ABNORMAL
HGB FRACT BLD ELPH-IMP: NORMAL
NUM UNITS TRANS PACKED RBC: NORMAL

## 2020-07-01 PROCEDURE — 86920 COMPATIBILITY TEST SPIN: CPT

## 2020-07-01 PROCEDURE — P9016 RBC LEUKOCYTES REDUCED: HCPCS

## 2020-07-01 PROCEDURE — 36415 COLL VENOUS BLD VENIPUNCTURE: CPT

## 2020-07-01 PROCEDURE — 86850 RBC ANTIBODY SCREEN: CPT

## 2020-07-01 PROCEDURE — 36430 TRANSFUSION BLD/BLD COMPNT: CPT

## 2020-07-01 PROCEDURE — 25000003 PHARM REV CODE 250: Performed by: NURSE PRACTITIONER

## 2020-07-01 RX ORDER — HYDROCODONE BITARTRATE AND ACETAMINOPHEN 500; 5 MG/1; MG/1
TABLET ORAL ONCE
Status: COMPLETED | OUTPATIENT
Start: 2020-07-01 | End: 2020-07-01

## 2020-07-01 RX ADMIN — SODIUM CHLORIDE: 0.9 INJECTION, SOLUTION INTRAVENOUS at 02:07

## 2020-07-01 NOTE — PLAN OF CARE
1426 after inserting PIV, pt started complaining of hot flash and feeling faint, BP per flow sheet., started bolus. BP trending up soon after starting bolus. Manuel MD called and notified, okay to proceed with transfusion.   1645 patient completed and tolerated transfusion. VSS, pt voiced no new complaints or concerns at this time. NAD noted, pt d/c home.

## 2020-07-02 ENCOUNTER — OFFICE VISIT (OUTPATIENT)
Dept: HEMATOLOGY/ONCOLOGY | Facility: CLINIC | Age: 35
End: 2020-07-02
Payer: COMMERCIAL

## 2020-07-02 DIAGNOSIS — D50.8 IRON DEFICIENCY ANEMIA SECONDARY TO INADEQUATE DIETARY IRON INTAKE: ICD-10-CM

## 2020-07-02 DIAGNOSIS — D64.9 SEVERE ANEMIA: ICD-10-CM

## 2020-07-02 PROBLEM — D50.9 IRON DEFICIENCY ANEMIA: Status: ACTIVE | Noted: 2020-07-02

## 2020-07-02 PROCEDURE — 99205 OFFICE O/P NEW HI 60 MIN: CPT | Mod: 95,,, | Performed by: INTERNAL MEDICINE

## 2020-07-02 PROCEDURE — 99205 PR OFFICE/OUTPT VISIT, NEW, LEVL V, 60-74 MIN: ICD-10-PCS | Mod: 95,,, | Performed by: INTERNAL MEDICINE

## 2020-07-02 RX ORDER — HEPARIN 100 UNIT/ML
500 SYRINGE INTRAVENOUS
Status: CANCELLED | OUTPATIENT
Start: 2020-07-18

## 2020-07-02 RX ORDER — HEPARIN 100 UNIT/ML
500 SYRINGE INTRAVENOUS
Status: CANCELLED | OUTPATIENT
Start: 2020-07-07

## 2020-07-02 RX ORDER — SODIUM CHLORIDE 0.9 % (FLUSH) 0.9 %
10 SYRINGE (ML) INJECTION
Status: CANCELLED | OUTPATIENT
Start: 2020-07-18

## 2020-07-02 RX ORDER — SODIUM CHLORIDE 0.9 % (FLUSH) 0.9 %
10 SYRINGE (ML) INJECTION
Status: CANCELLED | OUTPATIENT
Start: 2020-07-07

## 2020-07-02 NOTE — LETTER
July 2, 2020      Xi Beck MD  [address]  [address]  [place] [address]           [facility]  [address] [address]  Phone: [phone]          Patient: Lauren K Damico   MR Number: [id number]   YOB: 1985   Date of Visit: 7/2/2020       Dear Dr. Xi Beck:    Thank you for referring Lauren Damico to me for evaluation. Attached you will find relevant portions of my assessment and plan of care.    If you have questions, please do not hesitate to call me. I look forward to following Lauren Damico along with you.    Sincerely,    Jes Jackson MD    Enclosure  CC:  No Recipients    If you would like to receive this communication electronically, please contact [email or web address] or [phone] to request more information on [employer] Link access.    For providers and/or their staff who would like to refer a patient to [facility], please contact us through our one-stop-shop provider referral line, [facility] , at [phone].    If you feel you have received this communication in error or would no longer like to receive these types of communications, please e-mail [email or web address]

## 2020-07-02 NOTE — Clinical Note
1. Insurance auth and schedule iv iron x 2  2. See me in 3 months with cbc,cmp, iron, tibc, ferritin

## 2020-07-02 NOTE — PROGRESS NOTES
Hematology and Medical Oncology   New Patient Consult -- Virtual Visit     07/02/2020  Referred by:  Dr. Xi Beck    Reason For Referral: Iron Deficiency Anemia    Telemedicine Documentation:  The patient location is: car  The chief complaint leading to consultation is: iron deficiency     Visit type: audiovisual    Face to Face time with patient: 45  50 minutes of total time spent on the encounter, which includes face to face time and non-face to face time preparing to see the patient (eg, review of tests), Obtaining and/or reviewing separately obtained history, Documenting clinical information in the electronic or other health record, Independently interpreting results (not separately reported) and communicating results to the patient/family/caregiver, or Care coordination (not separately reported).       Each patient to whom he or she provides medical services by telemedicine is:  (1) informed of the relationship between the physician and patient and the respective role of any other health care provider with respect to management of the patient; and (2) notified that he or she may decline to receive medical services by telemedicine and may withdraw from such care at any time.      History of Present Ilness:   Lauren K Damico (Darby) is a pleasant 35 y.o.female who presents virtually after labs during pregnancy revealed a significant anemia with hemoglobin in the 6's. 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 She has been working at the  to avoid further vasovagal episodes. She does endorse significant fatigue and regular light headedness.    Labs on 6/29/2020:  Iron: 13  TIBC: 484  Ferritin: 3  Folate: 12.4  Hgb electrophoresis: 2% A2, which is normal    PAST MEDICAL HISTORY:   Past Medical History:   Diagnosis Date    Abnormal Pap smear 9/19/12    colpo/LEEP    Anemia     Anxiety     PCOS (polycystic ovarian syndrome)        PAST SURGICAL  HISTORY:   Past Surgical History:   Procedure Laterality Date    CERVICAL BIOPSY  W/ LOOP ELECTRODE EXCISION  10-17-12    GALLBLADDER SURGERY      GASTRIC RESTRICTION SURGERY      gastric sleeve in 2018    WISDOM TOOTH EXTRACTION         PAST SOCIAL HISTORY:   reports that she has never smoked. She has never used smokeless tobacco. She reports current alcohol use of about 1.0 standard drinks of alcohol per week. She reports that she does not use drugs.    FAMILY HISTORY:  Family History   Problem Relation Age of Onset    Ovarian cancer Paternal Grandmother     Breast cancer Paternal Aunt     Ovarian cancer Paternal Aunt     Breast cancer Paternal Aunt     Uterine cancer Paternal Aunt     Colon cancer Neg Hx        CURRENT MEDICATIONS:   Current Outpatient Medications   Medication Sig    ALPRAZolam (XANAX) 0.5 MG tablet TAKE ONE TABLET BY MOUTH EVERY EVENING AS NEEDED FOR anxiety    biotin 2,500 mcg Tab Take 3 tablets by mouth Daily.  Capsule Oral     cholecalciferol, vitamin D3, (VITAMIN D) 5,000 unit Tab Take 1 tablet by mouth Daily.  Tablet Oral     desvenlafaxine succinate (PRISTIQ) 100 MG Tb24 TAKE 2 TABLETS DAILY ** NO ALCOHOL ** *DO NOT CHEW/CRUSH/GRIND*    dicyclomine (BENTYL) 10 MG capsule TAKE ONE CAPSULE BY MOUTH TWICE DAILY MAY CAUSE DROWSINESS MAY CAUSE DIZZINESS    esomeprazole (NEXIUM) 20 MG capsule Take 20 mg by mouth 2 (two) times daily.    norethindrone-e.estradiol-iron (JUNEL FE 24) 1 mg-20 mcg (24)/75 mg (4) Oral per tablet Take 1 tablet by mouth once daily. (Patient not taking: Reported on 11/19/2019)    ondansetron (ZOFRAN) 4 MG tablet Take 1 tablet (4 mg total) by mouth 2 (two) times daily.    progesterone (PROMETRIUM) 200 MG capsule Take 1 capsule (200 mg total) by mouth every evening.     No current facility-administered medications for this visit.      ALLERGIES:   Review of patient's allergies indicates:  No Known Allergies      Review of Systems:     Review of Systems    Constitutional: Positive for fatigue. Negative for appetite change, chills, diaphoresis, fever and unexpected weight change.   HENT:   Negative for hearing loss, mouth sores, nosebleeds, sore throat, trouble swallowing and voice change.    Eyes: Negative for eye problems and icterus.   Respiratory: Positive for shortness of breath. Negative for chest tightness, cough, hemoptysis and wheezing.    Cardiovascular: Negative for chest pain, leg swelling and palpitations.   Gastrointestinal: Negative for abdominal distention, abdominal pain, blood in stool, diarrhea, nausea and vomiting.   Endocrine: Negative for hot flashes.   Genitourinary: Negative for bladder incontinence, difficulty urinating, dysuria and hematuria.    Musculoskeletal: Negative for arthralgias, back pain, flank pain, gait problem, myalgias, neck pain and neck stiffness.   Skin: Negative for itching, rash and wound.   Neurological: Positive for light-headedness. Negative for dizziness, extremity weakness, gait problem, headaches, numbness, seizures and speech difficulty.   Hematological: Negative for adenopathy. Does not bruise/bleed easily.   Psychiatric/Behavioral: Negative for confusion, depression and sleep disturbance. The patient is not nervous/anxious.           Physical Exam:     Limited secondary to virtual visit    ECOG Performance Status: (foot note - ECOG PS provided by Eastern Cooperative Oncology Group) 1 - Symptomatic but completely ambulatory    Karnofsky Performance Score:  80%- Normal Activity with Effort: Some Symptoms of Disease    Labs:   Lab Results   Component Value Date    WBC 9.41 06/29/2020    HGB 6.7 (L) 06/29/2020    HCT 24.6 (L) 06/29/2020     (H) 06/29/2020    CHOL 181 10/04/2017    TRIG 87 10/04/2017    HDL 40 10/04/2017    ALT 10 06/29/2020    AST 10 06/29/2020     (L) 06/29/2020    K 3.7 06/29/2020     06/29/2020    CREATININE 0.6 06/29/2020    BUN 8 06/29/2020    CO2 23 06/29/2020    TSH 1.266  11/19/2019    GLUF 92 11/19/2019    HGBA1C 5.4 11/19/2019         Imaging: Previous imaging has been reviewed     Assessment and Plan:     Ms. Damico is a 35 year old female who is currently 20 weeks pregnant and has iron deficiency anemia.    Iron Deficiency Anemia  --Symptomatic with a hemoglobin in the 6's  --Transfused 1 unit of blood yesterday  --Will obtain insurance auth and schedule IV iron x 2  --Repeat labs in 3 months prior to delivery    2nd trimester pregnancy  --Currently 20 weeks pregnant with a girl  --Managed as per OB    45 minutes were spent face to face with the patient to discuss the disease, natural history, treatment options. I have provided the patient with an opportunity to ask questions and have all questions answered to her satisfaction.       she will return to clinic in 3 months, but knows to call in the interim if symptoms change or should a problem arise.        Jes Jackson MD  Hematology and Medical Oncology  B[facility] Marrow Transplant  [facility]

## 2020-07-17 ENCOUNTER — INFUSION (OUTPATIENT)
Dept: INFUSION THERAPY | Facility: HOSPITAL | Age: 35
End: 2020-07-17
Attending: INTERNAL MEDICINE
Payer: COMMERCIAL

## 2020-07-17 VITALS
SYSTOLIC BLOOD PRESSURE: 122 MMHG | DIASTOLIC BLOOD PRESSURE: 66 MMHG | TEMPERATURE: 99 F | RESPIRATION RATE: 18 BRPM | HEART RATE: 100 BPM

## 2020-07-17 DIAGNOSIS — D50.8 IRON DEFICIENCY ANEMIA SECONDARY TO INADEQUATE DIETARY IRON INTAKE: Primary | ICD-10-CM

## 2020-07-17 PROCEDURE — 25000003 PHARM REV CODE 250: Performed by: INTERNAL MEDICINE

## 2020-07-17 PROCEDURE — 96374 THER/PROPH/DIAG INJ IV PUSH: CPT

## 2020-07-17 PROCEDURE — 63600175 PHARM REV CODE 636 W HCPCS: Mod: JG | Performed by: INTERNAL MEDICINE

## 2020-07-17 RX ADMIN — FERRIC CARBOXYMALTOSE INJECTION 750 MG: 50 INJECTION, SOLUTION INTRAVENOUS at 03:07

## 2020-07-17 RX ADMIN — SODIUM CHLORIDE: 9 INJECTION, SOLUTION INTRAVENOUS at 03:07

## 2020-07-17 NOTE — PLAN OF CARE
Pt tolerated Injectafer #1 today. VSS. NAD. Pt stayed for 30 min post infusion Observation period. PIV flushed and removed. Hep lock and deaccesed. Pt will RTC on 7/24/20 for Injectafer #2. declined AVS. Uses my [facility]. Discharged home. Ambulated independently with .

## 2020-07-17 NOTE — PLAN OF CARE
Problem: Adult Inpatient Plan of Care  Goal: Optimal Comfort and Wellbeing  Intervention: Provide Person-Centered Care  Flowsheets (Taken 7/17/2020 1524)  Trust Relationship/Rapport:   care explained   reassurance provided   choices provided   thoughts/feelings acknowledged   emotional support provided   empathic listening provided   questions answered   questions encouraged

## 2020-07-20 DIAGNOSIS — Z01.84 ANTIBODY RESPONSE EXAMINATION: ICD-10-CM

## 2020-07-21 ENCOUNTER — PATIENT OUTREACH (OUTPATIENT)
Dept: ADMINISTRATIVE | Facility: OTHER | Age: 35
End: 2020-07-21

## 2020-07-22 DIAGNOSIS — D50.8 IRON DEFICIENCY ANEMIA SECONDARY TO INADEQUATE DIETARY IRON INTAKE: Primary | ICD-10-CM

## 2020-07-23 ENCOUNTER — ROUTINE PRENATAL (OUTPATIENT)
Dept: OBSTETRICS AND GYNECOLOGY | Facility: CLINIC | Age: 35
End: 2020-07-23
Payer: COMMERCIAL

## 2020-07-23 VITALS — DIASTOLIC BLOOD PRESSURE: 70 MMHG | BODY MASS INDEX: 37.2 KG/M2 | SYSTOLIC BLOOD PRESSURE: 106 MMHG | WEIGHT: 196.88 LBS

## 2020-07-23 DIAGNOSIS — Z98.84 PERSONAL HISTORY OF GASTRIC BYPASS: ICD-10-CM

## 2020-07-23 DIAGNOSIS — Z3A.22 22 WEEKS GESTATION OF PREGNANCY: Primary | ICD-10-CM

## 2020-07-23 DIAGNOSIS — D64.9 SEVERE ANEMIA: ICD-10-CM

## 2020-07-23 PROCEDURE — 0502F PR SUBSEQUENT PRENATAL CARE: ICD-10-PCS | Mod: CPTII,S$GLB,, | Performed by: OBSTETRICS & GYNECOLOGY

## 2020-07-23 PROCEDURE — 99999 PR PBB SHADOW E&M-EST. PATIENT-LVL III: CPT | Mod: PBBFAC,,, | Performed by: OBSTETRICS & GYNECOLOGY

## 2020-07-23 PROCEDURE — 99999 PR PBB SHADOW E&M-EST. PATIENT-LVL III: ICD-10-PCS | Mod: PBBFAC,,, | Performed by: OBSTETRICS & GYNECOLOGY

## 2020-07-23 PROCEDURE — 0502F SUBSEQUENT PRENATAL CARE: CPT | Mod: CPTII,S$GLB,, | Performed by: OBSTETRICS & GYNECOLOGY

## 2020-07-23 NOTE — PROGRESS NOTES
HERE for routine OB visit at 22 3/7 wks, with NO complaints.  Denies vaginal bleeding, cramping/ ctx, or LOF.  + FM.  [facility] BID.   She has seen [facility],- Dr Mkie NOLAND, S/P 1 u PRBCs, Iv FE infusion, after second dose will get CBC. Precautions given.  F/U in four weeks.

## 2020-07-24 ENCOUNTER — INFUSION (OUTPATIENT)
Dept: INFUSION THERAPY | Facility: HOSPITAL | Age: 35
End: 2020-07-24
Attending: INTERNAL MEDICINE
Payer: COMMERCIAL

## 2020-07-24 VITALS
TEMPERATURE: 98 F | DIASTOLIC BLOOD PRESSURE: 56 MMHG | SYSTOLIC BLOOD PRESSURE: 112 MMHG | HEART RATE: 97 BPM | RESPIRATION RATE: 18 BRPM

## 2020-07-24 DIAGNOSIS — D50.8 IRON DEFICIENCY ANEMIA SECONDARY TO INADEQUATE DIETARY IRON INTAKE: Primary | ICD-10-CM

## 2020-07-24 PROCEDURE — 96365 THER/PROPH/DIAG IV INF INIT: CPT

## 2020-07-24 PROCEDURE — 25000003 PHARM REV CODE 250: Performed by: INTERNAL MEDICINE

## 2020-07-24 PROCEDURE — 63600175 PHARM REV CODE 636 W HCPCS: Mod: JG | Performed by: INTERNAL MEDICINE

## 2020-07-24 RX ORDER — SODIUM CHLORIDE 0.9 % (FLUSH) 0.9 %
10 SYRINGE (ML) INJECTION
Status: DISCONTINUED | OUTPATIENT
Start: 2020-07-24 | End: 2020-07-27 | Stop reason: HOSPADM

## 2020-07-24 RX ORDER — HEPARIN 100 UNIT/ML
500 SYRINGE INTRAVENOUS
Status: DISCONTINUED | OUTPATIENT
Start: 2020-07-24 | End: 2020-07-27 | Stop reason: HOSPADM

## 2020-07-24 RX ADMIN — FERRIC CARBOXYMALTOSE INJECTION 750 MG: 50 INJECTION, SOLUTION INTRAVENOUS at 03:07

## 2020-07-24 RX ADMIN — SODIUM CHLORIDE: 9 INJECTION, SOLUTION INTRAVENOUS at 03:07

## 2020-07-24 NOTE — PLAN OF CARE
Tolerated injectafer well.  Observed pt 15 minutes post infusion, no reactions noted.  No questions or concerns at this time.  Ambulated off unit in NAD.

## 2020-08-11 ENCOUNTER — PROCEDURE VISIT (OUTPATIENT)
Dept: MATERNAL FETAL MEDICINE | Facility: CLINIC | Age: 35
End: 2020-08-11
Payer: COMMERCIAL

## 2020-08-11 DIAGNOSIS — Z36.89 ENCOUNTER FOR ULTRASOUND TO ASSESS FETAL GROWTH: ICD-10-CM

## 2020-08-11 PROCEDURE — 76816 PR  US,PREGNANT UTERUS,F/U,TRANSABD APP: ICD-10-PCS | Mod: S$GLB,,, | Performed by: OBSTETRICS & GYNECOLOGY

## 2020-08-11 PROCEDURE — 76816 OB US FOLLOW-UP PER FETUS: CPT | Mod: S$GLB,,, | Performed by: OBSTETRICS & GYNECOLOGY

## 2020-08-14 DIAGNOSIS — Z11.59 NEED FOR HEPATITIS C SCREENING TEST: ICD-10-CM

## 2020-08-19 DIAGNOSIS — Z01.84 ANTIBODY RESPONSE EXAMINATION: ICD-10-CM

## 2020-08-20 ENCOUNTER — PATIENT MESSAGE (OUTPATIENT)
Dept: OBSTETRICS AND GYNECOLOGY | Facility: CLINIC | Age: 35
End: 2020-08-20

## 2020-08-20 DIAGNOSIS — Z3A.22 22 WEEKS GESTATION OF PREGNANCY: Primary | ICD-10-CM

## 2020-08-28 ENCOUNTER — LAB VISIT (OUTPATIENT)
Dept: LAB | Facility: OTHER | Age: 35
End: 2020-08-28
Attending: OBSTETRICS & GYNECOLOGY
Payer: COMMERCIAL

## 2020-08-28 ENCOUNTER — ROUTINE PRENATAL (OUTPATIENT)
Dept: OBSTETRICS AND GYNECOLOGY | Facility: CLINIC | Age: 35
End: 2020-08-28
Payer: COMMERCIAL

## 2020-08-28 ENCOUNTER — OFFICE VISIT (OUTPATIENT)
Dept: FAMILY MEDICINE | Facility: CLINIC | Age: 35
End: 2020-08-28
Payer: COMMERCIAL

## 2020-08-28 VITALS
SYSTOLIC BLOOD PRESSURE: 100 MMHG | TEMPERATURE: 99 F | BODY MASS INDEX: 37.75 KG/M2 | DIASTOLIC BLOOD PRESSURE: 60 MMHG | HEART RATE: 96 BPM | OXYGEN SATURATION: 97 % | HEIGHT: 61 IN | WEIGHT: 199.94 LBS

## 2020-08-28 VITALS — BODY MASS INDEX: 37.89 KG/M2 | WEIGHT: 200.5 LBS | SYSTOLIC BLOOD PRESSURE: 102 MMHG | DIASTOLIC BLOOD PRESSURE: 68 MMHG

## 2020-08-28 DIAGNOSIS — F39 MOOD DISORDER: ICD-10-CM

## 2020-08-28 DIAGNOSIS — Z3A.22 22 WEEKS GESTATION OF PREGNANCY: ICD-10-CM

## 2020-08-28 DIAGNOSIS — Z3A.27 27 WEEKS GESTATION OF PREGNANCY: Primary | ICD-10-CM

## 2020-08-28 DIAGNOSIS — Z3A.27 27 WEEKS GESTATION OF PREGNANCY: ICD-10-CM

## 2020-08-28 DIAGNOSIS — Z33.1 IUP (INTRAUTERINE PREGNANCY), INCIDENTAL: Primary | ICD-10-CM

## 2020-08-28 DIAGNOSIS — Z23 NEED FOR TDAP VACCINATION: ICD-10-CM

## 2020-08-28 LAB
ALBUMIN SERPL BCP-MCNC: 2.7 G/DL (ref 3.5–5.2)
ALP SERPL-CCNC: 90 U/L (ref 55–135)
ALT SERPL W/O P-5'-P-CCNC: 16 U/L (ref 10–44)
ANION GAP SERPL CALC-SCNC: 8 MMOL/L (ref 8–16)
ANISOCYTOSIS BLD QL SMEAR: SLIGHT
AST SERPL-CCNC: 11 U/L (ref 10–40)
BASOPHILS # BLD AUTO: 0.02 K/UL (ref 0–0.2)
BASOPHILS NFR BLD: 0.2 % (ref 0–1.9)
BILIRUB SERPL-MCNC: 0.1 MG/DL (ref 0.1–1)
BUN SERPL-MCNC: 6 MG/DL (ref 6–20)
BURR CELLS BLD QL SMEAR: ABNORMAL
CALCIUM SERPL-MCNC: 8.2 MG/DL (ref 8.7–10.5)
CHLORIDE SERPL-SCNC: 106 MMOL/L (ref 95–110)
CO2 SERPL-SCNC: 21 MMOL/L (ref 23–29)
CREAT SERPL-MCNC: 0.5 MG/DL (ref 0.5–1.4)
DACRYOCYTES BLD QL SMEAR: ABNORMAL
DIFFERENTIAL METHOD: ABNORMAL
EOSINOPHIL # BLD AUTO: 0.1 K/UL (ref 0–0.5)
EOSINOPHIL NFR BLD: 0.9 % (ref 0–8)
ERYTHROCYTE [DISTWIDTH] IN BLOOD BY AUTOMATED COUNT: ABNORMAL % (ref 11.5–14.5)
EST. GFR  (AFRICAN AMERICAN): >60 ML/MIN/1.73 M^2
EST. GFR  (NON AFRICAN AMERICAN): >60 ML/MIN/1.73 M^2
GLUCOSE SERPL-MCNC: 131 MG/DL (ref 70–110)
GLUCOSE SERPL-MCNC: 136 MG/DL (ref 70–140)
HCT VFR BLD AUTO: 32.9 % (ref 37–48.5)
HGB BLD-MCNC: 10.1 G/DL (ref 12–16)
HYPOCHROMIA BLD QL SMEAR: ABNORMAL
IMM GRANULOCYTES # BLD AUTO: 0.12 K/UL (ref 0–0.04)
IMM GRANULOCYTES NFR BLD AUTO: 1.4 % (ref 0–0.5)
LYMPHOCYTES # BLD AUTO: 1.4 K/UL (ref 1–4.8)
LYMPHOCYTES NFR BLD: 15.2 % (ref 18–48)
MCH RBC QN AUTO: 26.8 PG (ref 27–31)
MCHC RBC AUTO-ENTMCNC: 30.7 G/DL (ref 32–36)
MCV RBC AUTO: 87 FL (ref 82–98)
MONOCYTES # BLD AUTO: 0.5 K/UL (ref 0.3–1)
MONOCYTES NFR BLD: 6 % (ref 4–15)
NEUTROPHILS # BLD AUTO: 6.8 K/UL (ref 1.8–7.7)
NEUTROPHILS NFR BLD: 76.3 % (ref 38–73)
NRBC BLD-RTO: 0 /100 WBC
OVALOCYTES BLD QL SMEAR: ABNORMAL
PLATELET # BLD AUTO: 328 K/UL (ref 150–350)
PLATELET BLD QL SMEAR: ABNORMAL
PMV BLD AUTO: 8.9 FL (ref 9.2–12.9)
POIKILOCYTOSIS BLD QL SMEAR: SLIGHT
POLYCHROMASIA BLD QL SMEAR: ABNORMAL
POTASSIUM SERPL-SCNC: 3.1 MMOL/L (ref 3.5–5.1)
PROT SERPL-MCNC: 6 G/DL (ref 6–8.4)
RBC # BLD AUTO: 3.77 M/UL (ref 4–5.4)
SCHISTOCYTES BLD QL SMEAR: PRESENT
SODIUM SERPL-SCNC: 135 MMOL/L (ref 136–145)
WBC # BLD AUTO: 8.86 K/UL (ref 3.9–12.7)

## 2020-08-28 PROCEDURE — 90715 TDAP VACCINE 7 YRS/> IM: CPT | Mod: S$GLB,,, | Performed by: OBSTETRICS & GYNECOLOGY

## 2020-08-28 PROCEDURE — 90471 IMMUNIZATION ADMIN: CPT | Mod: S$GLB,,, | Performed by: OBSTETRICS & GYNECOLOGY

## 2020-08-28 PROCEDURE — 99214 PR OFFICE/OUTPT VISIT, EST, LEVL IV, 30-39 MIN: ICD-10-PCS | Mod: S$GLB,,, | Performed by: FAMILY MEDICINE

## 2020-08-28 PROCEDURE — 0502F SUBSEQUENT PRENATAL CARE: CPT | Mod: CPTII,S$GLB,, | Performed by: OBSTETRICS & GYNECOLOGY

## 2020-08-28 PROCEDURE — 3008F PR BODY MASS INDEX (BMI) DOCUMENTED: ICD-10-PCS | Mod: CPTII,S$GLB,, | Performed by: FAMILY MEDICINE

## 2020-08-28 PROCEDURE — 99999 PR PBB SHADOW E&M-EST. PATIENT-LVL III: CPT | Mod: PBBFAC,,, | Performed by: FAMILY MEDICINE

## 2020-08-28 PROCEDURE — 3008F BODY MASS INDEX DOCD: CPT | Mod: CPTII,S$GLB,, | Performed by: FAMILY MEDICINE

## 2020-08-28 PROCEDURE — 85025 COMPLETE CBC W/AUTO DIFF WBC: CPT

## 2020-08-28 PROCEDURE — 36415 COLL VENOUS BLD VENIPUNCTURE: CPT

## 2020-08-28 PROCEDURE — 99999 PR PBB SHADOW E&M-EST. PATIENT-LVL III: CPT | Mod: PBBFAC,,, | Performed by: OBSTETRICS & GYNECOLOGY

## 2020-08-28 PROCEDURE — 80053 COMPREHEN METABOLIC PANEL: CPT

## 2020-08-28 PROCEDURE — 99214 OFFICE O/P EST MOD 30 MIN: CPT | Mod: S$GLB,,, | Performed by: FAMILY MEDICINE

## 2020-08-28 PROCEDURE — 82950 GLUCOSE TEST: CPT

## 2020-08-28 PROCEDURE — 99999 PR PBB SHADOW E&M-EST. PATIENT-LVL III: ICD-10-PCS | Mod: PBBFAC,,, | Performed by: OBSTETRICS & GYNECOLOGY

## 2020-08-28 PROCEDURE — 90715 TDAP VACCINE GREATER THAN OR EQUAL TO 7YO IM: ICD-10-PCS | Mod: S$GLB,,, | Performed by: OBSTETRICS & GYNECOLOGY

## 2020-08-28 PROCEDURE — 99999 PR PBB SHADOW E&M-EST. PATIENT-LVL III: ICD-10-PCS | Mod: PBBFAC,,, | Performed by: FAMILY MEDICINE

## 2020-08-28 PROCEDURE — 90471 TDAP VACCINE GREATER THAN OR EQUAL TO 7YO IM: ICD-10-PCS | Mod: S$GLB,,, | Performed by: OBSTETRICS & GYNECOLOGY

## 2020-08-28 PROCEDURE — 0502F PR SUBSEQUENT PRENATAL CARE: ICD-10-PCS | Mod: CPTII,S$GLB,, | Performed by: OBSTETRICS & GYNECOLOGY

## 2020-08-28 RX ORDER — DESVENLAFAXINE 100 MG/1
TABLET, EXTENDED RELEASE ORAL
Qty: 60 TABLET | Refills: 5 | Status: CANCELLED | OUTPATIENT
Start: 2020-08-28

## 2020-08-28 RX ORDER — DESVENLAFAXINE SUCCINATE 50 MG/1
TABLET, EXTENDED RELEASE ORAL
Qty: 30 TABLET | Refills: 0 | Status: SHIPPED | OUTPATIENT
Start: 2020-08-28 | End: 2020-10-19

## 2020-08-28 NOTE — PROGRESS NOTES
After obtaining consent, and per orders of Dr. Beck, injection of TDAP Lot [id number] Exp 4/12/22 given in the LD by NELLIE GIPSON. Patient tolerated well and band aid applied. Patient instructed to remain in clinic for 15 minutes afterwards, and to report any adverse reaction to me immediately.

## 2020-08-28 NOTE — PROGRESS NOTES
HERE for routine OB visit at 27 4/7 wks, with N/V.  Tired and working a great deal at [employer]  Denies vaginal bleeding, cramping/ ctx, or LOF.  + FM.  [facility] BID.  CMP CBC and OB screen today.  tdap  F/U two weeks

## 2020-08-28 NOTE — PROGRESS NOTES
Answers for HPI/ROS submitted by the patient on 8/28/2020   activity change: No  unexpected weight change: No  neck pain: No  hearing loss: No  rhinorrhea: No  trouble swallowing: No  eye discharge: No  visual disturbance: No  chest tightness: No  wheezing: No  chest pain: No  palpitations: No  blood in stool: No  constipation: No  vomiting: Yes  diarrhea: No  polydipsia: No  polyuria: Yes  difficulty urinating: No  hematuria: No  menstrual problem: No  dysuria: No  joint swelling: No  arthralgias: No  headaches: No  weakness: No  confusion: No  dysphoric mood: No

## 2020-08-28 NOTE — PROGRESS NOTES
Subjective:       Patient ID: Lauren K Damico is a 35 y.o. female.    Chief Complaint: Follow Up/ Renew medications    35 year old female presents for refills of pristiq. She is doing well. We did discuss decreasing and stopping her medication. She just started her 3rd trimester of her pregnancy. She has suffered with anemia and has had transfusions.       Past Medical History:  9/19/12: Abnormal Pap smear      Comment:  colpo/LEEP  No date: Anemia  No date: Anxiety  No date: PCOS (polycystic ovarian syndrome)   Past Surgical History:  10-17-12: CERVICAL BIOPSY  W/ LOOP ELECTRODE EXCISION  No date: GALLBLADDER SURGERY  No date: GASTRIC RESTRICTION SURGERY      Comment:  gastric sleeve in 2018  No date: WISDOM TOOTH EXTRACTION  Review of patient's family history indicates:  Problem: Ovarian cancer      Relation: Paternal Grandmother          Age of Onset: (Not Specified)  Problem: Breast cancer      Relation: Paternal Aunt          Age of Onset: (Not Specified)  Problem: Ovarian cancer      Relation: Paternal Aunt          Age of Onset: (Not Specified)  Problem: Breast cancer      Relation: Paternal Aunt          Age of Onset: (Not Specified)  Problem: Uterine cancer      Relation: Paternal Aunt          Age of Onset: (Not Specified)  Problem: Colon cancer      Relation: Neg Hx          Age of Onset: (Not Specified)    Social History    Socioeconomic History      Marital status:       Spouse name: Not on file      Number of children: Not on file      Years of education: Not on file      Highest education level: Not on file    Occupational History      Not on file    Social Needs      Financial resource strain: Not hard at all      Food insecurity        Worry: Never true        Inability: Never true      Transportation needs        Medical: No        Non-medical: No    Tobacco Use      Smoking status: Never Smoker      Smokeless tobacco: Never Used    Substance and Sexual Activity      Alcohol use: Yes         "Alcohol/week: 1.0 standard drinks        Types: 1 Glasses of wine per week        Frequency: Never        Drinks per session: 1 or 2        Binge frequency: Never      Drug use: No      Sexual activity: Yes        Partners: Male        Birth control/protection: None    Lifestyle      Physical activity        Days per week: 0 days        Minutes per session: 0 min      Stress: Only a little    Relationships      Social connections        Talks on phone: More than three times a week        Gets together: Three times a week        Attends [employer] service: Not on file        Active member of club or organization: No        Attends meetings of clubs or organizations: Never        Relationship status:     Other Topics      Concerns:        Not on file    Social History Narrative      Not on file        Review of Systems   Constitutional: Negative for activity change and unexpected weight change.   HENT: Negative for hearing loss, rhinorrhea and trouble swallowing.    Eyes: Negative for discharge and visual disturbance.   Respiratory: Negative for chest tightness, shortness of breath and wheezing.    Cardiovascular: Negative for chest pain, palpitations and leg swelling.   Gastrointestinal: Positive for vomiting. Negative for abdominal pain, blood in stool, constipation and diarrhea.   Endocrine: Negative for polydipsia and polyuria.   Genitourinary: Negative for difficulty urinating, dysuria, hematuria and menstrual problem.   Musculoskeletal: Negative for arthralgias, joint swelling and neck pain.   Neurological: Negative for weakness and headaches.   Psychiatric/Behavioral: Negative for confusion and dysphoric mood.         Objective:       Vitals:    08/28/20 0831   BP: 100/60   Pulse: 96   Temp: 98.7 °F (37.1 °C)   TempSrc: Oral   SpO2: 97%   Weight: 90.7 kg (199 lb 15.3 oz)   Height: 5' 1" (1.549 m)       Physical Exam  Constitutional:       General: She is not in acute distress.     Appearance: Normal " appearance. She is well-developed. She is not ill-appearing or diaphoretic.   HENT:      Head: Normocephalic and atraumatic.      Right Ear: External ear normal.      Left Ear: External ear normal.      Mouth/Throat:      Pharynx: No oropharyngeal exudate.   Eyes:      Conjunctiva/sclera: Conjunctivae normal.   Neck:      Musculoskeletal: Normal range of motion and neck supple.      Thyroid: No thyromegaly.   Cardiovascular:      Rate and Rhythm: Normal rate and regular rhythm.      Heart sounds: Normal heart sounds. No murmur. No friction rub. No gallop.    Pulmonary:      Effort: Pulmonary effort is normal. No respiratory distress.      Breath sounds: Normal breath sounds. No stridor. No wheezing, rhonchi or rales.   Chest:      Chest wall: No tenderness.   Abdominal:      General: Bowel sounds are normal. There is no distension.      Palpations: Abdomen is soft. There is no mass.      Tenderness: There is no abdominal tenderness. There is no guarding or rebound.      Hernia: No hernia is present.   Lymphadenopathy:      Cervical: No cervical adenopathy.   Skin:     Findings: No rash.   Neurological:      Mental Status: She is alert.   Psychiatric:         Mood and Affect: Mood normal.         Assessment:       1. IUP (intrauterine pregnancy), incidental    2. Mood disorder        Plan:       Darby was seen today for follow up/ renew medications.    Diagnoses and all orders for this visit:    IUP (intrauterine pregnancy), incidental    Mood disorder  -     desvenlafaxine succinate (PRISTIQ) 50 MG Tb24; Take 1 tablet po daily for 2 weeks than stop.  Just made aware that she is pregnant. Decreased Pristiq to 50 mg of pristiq from 100 mg. Will try to wean her off the medication if she can tolerate this.

## 2020-09-06 ENCOUNTER — HOSPITAL ENCOUNTER (EMERGENCY)
Facility: OTHER | Age: 35
Discharge: HOME | End: 2020-09-06
Attending: OBSTETRICS & GYNECOLOGY
Payer: COMMERCIAL

## 2020-09-06 VITALS
SYSTOLIC BLOOD PRESSURE: 115 MMHG | RESPIRATION RATE: 16 BRPM | OXYGEN SATURATION: 98 % | DIASTOLIC BLOOD PRESSURE: 58 MMHG | TEMPERATURE: 97 F | HEART RATE: 103 BPM

## 2020-09-06 DIAGNOSIS — Z3A.28 28 WEEKS GESTATION OF PREGNANCY: ICD-10-CM

## 2020-09-06 DIAGNOSIS — O36.8131 DECREASED FETAL MOVEMENTS IN THIRD TRIMESTER, FETUS 1 OF MULTIPLE GESTATION: Primary | ICD-10-CM

## 2020-09-06 PROCEDURE — 59025 FETAL NON-STRESS TEST: CPT

## 2020-09-06 PROCEDURE — 99283 PR EMERGENCY DEPT VISIT,LEVEL III: ICD-10-PCS | Mod: 25,,, | Performed by: OBSTETRICS & GYNECOLOGY

## 2020-09-06 PROCEDURE — 59025 FETAL NON-STRESS TEST: CPT | Mod: 26,,, | Performed by: OBSTETRICS & GYNECOLOGY

## 2020-09-06 PROCEDURE — 99284 EMERGENCY DEPT VISIT MOD MDM: CPT | Mod: 25

## 2020-09-06 PROCEDURE — 59025 PR FETAL 2N-STRESS TEST: ICD-10-PCS | Mod: 26,,, | Performed by: OBSTETRICS & GYNECOLOGY

## 2020-09-06 PROCEDURE — 99283 EMERGENCY DEPT VISIT LOW MDM: CPT | Mod: 25,,, | Performed by: OBSTETRICS & GYNECOLOGY

## 2020-09-06 NOTE — ED PROVIDER NOTES
Encounter Date: 2020       History     Chief Complaint   Patient presents with    Decreased Fetal Movement     Ms. Damico is a 36 YO  at 28w6d presenting for evaluation of decreased fetal movement. She reports she noticed baby moving less last night, but not enough of a change to be concerned. States this morning she did formal kick counts and only had 4 movements in 2 hours. She denies vaginal bleeding, LOF, contractions.     This IUP is complicated by anemia (blood transfusion at 18wga, OP IV iron).             Review of patient's allergies indicates:  No Known Allergies  Past Medical History:   Diagnosis Date    Abnormal Pap smear 12    colpo/LEEP    Anemia     Anxiety     PCOS (polycystic ovarian syndrome)      Past Surgical History:   Procedure Laterality Date    CERVICAL BIOPSY  W/ LOOP ELECTRODE EXCISION  10-17-12    GALLBLADDER SURGERY      GASTRIC RESTRICTION SURGERY      gastric sleeve in 2018    WISDOM TOOTH EXTRACTION       Family History   Problem Relation Age of Onset    Ovarian cancer Paternal Grandmother     Breast cancer Paternal Aunt     Ovarian cancer Paternal Aunt     Breast cancer Paternal Aunt     Uterine cancer Paternal Aunt     Colon cancer Neg Hx      Social History     Tobacco Use    Smoking status: Never Smoker    Smokeless tobacco: Never Used   Substance Use Topics    Alcohol use: Yes     Alcohol/week: 1.0 standard drinks     Types: 1 Glasses of wine per week     Frequency: Never     Drinks per session: 1 or 2     Binge frequency: Never    Drug use: No     Review of Systems   Constitutional: Negative for fever.   HENT: Negative for sore throat.    Respiratory: Negative for shortness of breath.    Cardiovascular: Negative for chest pain.   Gastrointestinal: Negative for nausea.   Genitourinary: Negative for dysuria and vaginal bleeding.   Musculoskeletal: Negative for back pain.   Skin: Negative for rash.   Neurological: Negative for weakness.    Hematological: Does not bruise/bleed easily.       Physical Exam     Initial Vitals   BP Pulse Resp Temp SpO2   -- -- -- -- --      MAP       --         Physical Exam    Nursing note and vitals reviewed.  Constitutional: She appears well-developed and well-nourished.   HENT:   Head: Normocephalic and atraumatic.   Eyes: Conjunctivae and EOM are normal. Pupils are equal, round, and reactive to light.   Neck: Normal range of motion. Neck supple.   Cardiovascular: Normal rate.   Pulmonary/Chest: No respiratory distress.   Musculoskeletal: Normal range of motion.   Neurological: She is alert and oriented to person, place, and time. She has normal strength and normal reflexes.   Skin: Skin is warm and dry.   Psychiatric: She has a normal mood and affect. Her behavior is normal. Judgment and thought content normal.         ED Course   Obtain Fetal nonstress test (NST)    Date/Time: 9/6/2020 3:08 PM  Performed by: Sheila Contreras MD  Authorized by: Sheila Contreras MD     Nonstress Test:     Variability:  6-25 BPM    Decelerations:  None    Accelerations:  15 bpm    Acoustic Stimulator: No      Baseline:  135    Uterine Irritability: Yes      Contractions:  Not present  Biophysical Profile:     Nonstress Test Interpretation: reactive      Overall Impression:  Reassuring      Labs Reviewed - No data to display       Imaging Results    None          Medical Decision Making:   ED Management:  NST   Kick count education   Discharge home with PTL precautions                                 Clinical Impression:       ICD-10-CM ICD-9-CM   1. Decreased fetal movements in third trimester, fetus 1 of multiple gestation  O36.8131 655.73                     Sheila Contreras MD   PGY-1, OB-GYN               Sheila Contreras MD  Resident  09/06/20 1525

## 2020-09-11 ENCOUNTER — ROUTINE PRENATAL (OUTPATIENT)
Dept: OBSTETRICS AND GYNECOLOGY | Facility: CLINIC | Age: 35
End: 2020-09-11
Payer: COMMERCIAL

## 2020-09-11 VITALS
WEIGHT: 203.25 LBS | SYSTOLIC BLOOD PRESSURE: 100 MMHG | DIASTOLIC BLOOD PRESSURE: 60 MMHG | BODY MASS INDEX: 38.41 KG/M2

## 2020-09-11 DIAGNOSIS — Z3A.29 29 WEEKS GESTATION OF PREGNANCY: Primary | ICD-10-CM

## 2020-09-11 PROCEDURE — 0502F SUBSEQUENT PRENATAL CARE: CPT | Mod: CPTII,S$GLB,, | Performed by: OBSTETRICS & GYNECOLOGY

## 2020-09-11 PROCEDURE — 99999 PR PBB SHADOW E&M-EST. PATIENT-LVL III: CPT | Mod: PBBFAC,,, | Performed by: OBSTETRICS & GYNECOLOGY

## 2020-09-11 PROCEDURE — 99999 PR PBB SHADOW E&M-EST. PATIENT-LVL III: ICD-10-PCS | Mod: PBBFAC,,, | Performed by: OBSTETRICS & GYNECOLOGY

## 2020-09-11 PROCEDURE — 0502F PR SUBSEQUENT PRENATAL CARE: ICD-10-PCS | Mod: CPTII,S$GLB,, | Performed by: OBSTETRICS & GYNECOLOGY

## 2020-09-11 NOTE — PROGRESS NOTES
HERE for routine OB visit at 29 4/7 wks, with NO complaints.  Denies vaginal bleeding, cramping/ ctx, or LOF.  + FM.  [facility] BID.   PTL precautions.  OB screen WNL.  H/H improved after FE injection and Supplements.  F/U in two weeks.

## 2020-09-18 DIAGNOSIS — Z01.84 ANTIBODY RESPONSE EXAMINATION: ICD-10-CM

## 2020-09-25 ENCOUNTER — ROUTINE PRENATAL (OUTPATIENT)
Dept: OBSTETRICS AND GYNECOLOGY | Facility: CLINIC | Age: 35
End: 2020-09-25
Payer: COMMERCIAL

## 2020-09-25 VITALS
SYSTOLIC BLOOD PRESSURE: 106 MMHG | WEIGHT: 203.94 LBS | DIASTOLIC BLOOD PRESSURE: 68 MMHG | BODY MASS INDEX: 38.53 KG/M2

## 2020-09-25 DIAGNOSIS — Z3A.31 31 WEEKS GESTATION OF PREGNANCY: Primary | ICD-10-CM

## 2020-09-25 PROCEDURE — 0502F PR SUBSEQUENT PRENATAL CARE: ICD-10-PCS | Mod: CPTII,S$GLB,, | Performed by: OBSTETRICS & GYNECOLOGY

## 2020-09-25 PROCEDURE — 0502F SUBSEQUENT PRENATAL CARE: CPT | Mod: CPTII,S$GLB,, | Performed by: OBSTETRICS & GYNECOLOGY

## 2020-09-25 PROCEDURE — 99999 PR PBB SHADOW E&M-EST. PATIENT-LVL III: CPT | Mod: PBBFAC,,, | Performed by: OBSTETRICS & GYNECOLOGY

## 2020-09-25 PROCEDURE — 99999 PR PBB SHADOW E&M-EST. PATIENT-LVL III: ICD-10-PCS | Mod: PBBFAC,,, | Performed by: OBSTETRICS & GYNECOLOGY

## 2020-10-09 ENCOUNTER — TELEPHONE (OUTPATIENT)
Dept: OBSTETRICS AND GYNECOLOGY | Facility: CLINIC | Age: 35
End: 2020-10-09

## 2020-10-09 ENCOUNTER — ROUTINE PRENATAL (OUTPATIENT)
Dept: OBSTETRICS AND GYNECOLOGY | Facility: CLINIC | Age: 35
End: 2020-10-09
Payer: COMMERCIAL

## 2020-10-09 VITALS — SYSTOLIC BLOOD PRESSURE: 99 MMHG | DIASTOLIC BLOOD PRESSURE: 60 MMHG | BODY MASS INDEX: 38.74 KG/M2 | WEIGHT: 205 LBS

## 2020-10-09 DIAGNOSIS — Z3A.33 PREGNANCY WITH 33 COMPLETED WEEKS GESTATION: Primary | ICD-10-CM

## 2020-10-09 PROCEDURE — 0502F PR SUBSEQUENT PRENATAL CARE: ICD-10-PCS | Mod: CPTII,S$GLB,, | Performed by: OBSTETRICS & GYNECOLOGY

## 2020-10-09 PROCEDURE — 99999 PR PBB SHADOW E&M-EST. PATIENT-LVL II: CPT | Mod: PBBFAC,,, | Performed by: OBSTETRICS & GYNECOLOGY

## 2020-10-09 PROCEDURE — 99999 PR PBB SHADOW E&M-EST. PATIENT-LVL II: ICD-10-PCS | Mod: PBBFAC,,, | Performed by: OBSTETRICS & GYNECOLOGY

## 2020-10-09 PROCEDURE — 0502F SUBSEQUENT PRENATAL CARE: CPT | Mod: CPTII,S$GLB,, | Performed by: OBSTETRICS & GYNECOLOGY

## 2020-10-09 NOTE — TELEPHONE ENCOUNTER
----- Message from Pepe Davey sent at 10/9/2020  8:35 AM CDT -----  Please call ob pt she saw  today she needs to see  in 2 weeks nothing available that I can schedule thanks [phone]

## 2020-10-18 DIAGNOSIS — Z01.84 ANTIBODY RESPONSE EXAMINATION: ICD-10-CM

## 2020-10-19 ENCOUNTER — LAB VISIT (OUTPATIENT)
Dept: LAB | Facility: HOSPITAL | Age: 35
End: 2020-10-19
Attending: INTERNAL MEDICINE
Payer: COMMERCIAL

## 2020-10-19 ENCOUNTER — OFFICE VISIT (OUTPATIENT)
Dept: HEMATOLOGY/ONCOLOGY | Facility: CLINIC | Age: 35
End: 2020-10-19
Payer: COMMERCIAL

## 2020-10-19 VITALS
HEART RATE: 90 BPM | WEIGHT: 208.25 LBS | BODY MASS INDEX: 39.32 KG/M2 | TEMPERATURE: 98 F | DIASTOLIC BLOOD PRESSURE: 57 MMHG | RESPIRATION RATE: 16 BRPM | OXYGEN SATURATION: 100 % | SYSTOLIC BLOOD PRESSURE: 92 MMHG | HEIGHT: 61 IN

## 2020-10-19 DIAGNOSIS — D50.8 IRON DEFICIENCY ANEMIA SECONDARY TO INADEQUATE DIETARY IRON INTAKE: Primary | ICD-10-CM

## 2020-10-19 DIAGNOSIS — D50.8 IRON DEFICIENCY ANEMIA SECONDARY TO INADEQUATE DIETARY IRON INTAKE: ICD-10-CM

## 2020-10-19 DIAGNOSIS — Z34.93 THIRD TRIMESTER PREGNANCY: ICD-10-CM

## 2020-10-19 LAB
ALBUMIN SERPL BCP-MCNC: 2.5 G/DL (ref 3.5–5.2)
ALP SERPL-CCNC: 136 U/L (ref 55–135)
ALT SERPL W/O P-5'-P-CCNC: 10 U/L (ref 10–44)
ANION GAP SERPL CALC-SCNC: 7 MMOL/L (ref 8–16)
AST SERPL-CCNC: 11 U/L (ref 10–40)
BASOPHILS # BLD AUTO: 0.02 K/UL (ref 0–0.2)
BASOPHILS NFR BLD: 0.3 % (ref 0–1.9)
BILIRUB SERPL-MCNC: 0.2 MG/DL (ref 0.1–1)
BUN SERPL-MCNC: 6 MG/DL (ref 6–20)
CALCIUM SERPL-MCNC: 8.2 MG/DL (ref 8.7–10.5)
CHLORIDE SERPL-SCNC: 107 MMOL/L (ref 95–110)
CO2 SERPL-SCNC: 24 MMOL/L (ref 23–29)
CREAT SERPL-MCNC: 0.5 MG/DL (ref 0.5–1.4)
DIFFERENTIAL METHOD: ABNORMAL
EOSINOPHIL # BLD AUTO: 0.1 K/UL (ref 0–0.5)
EOSINOPHIL NFR BLD: 0.8 % (ref 0–8)
ERYTHROCYTE [DISTWIDTH] IN BLOOD BY AUTOMATED COUNT: 15.7 % (ref 11.5–14.5)
EST. GFR  (AFRICAN AMERICAN): >60 ML/MIN/1.73 M^2
EST. GFR  (NON AFRICAN AMERICAN): >60 ML/MIN/1.73 M^2
FERRITIN SERPL-MCNC: 5 NG/ML (ref 20–300)
GLUCOSE SERPL-MCNC: 100 MG/DL (ref 70–110)
HCT VFR BLD AUTO: 33.1 % (ref 37–48.5)
HGB BLD-MCNC: 10.6 G/DL (ref 12–16)
IMM GRANULOCYTES # BLD AUTO: 0.09 K/UL (ref 0–0.04)
IMM GRANULOCYTES NFR BLD AUTO: 1.2 % (ref 0–0.5)
IRON SERPL-MCNC: 64 UG/DL (ref 30–160)
LYMPHOCYTES # BLD AUTO: 1.3 K/UL (ref 1–4.8)
LYMPHOCYTES NFR BLD: 17 % (ref 18–48)
MCH RBC QN AUTO: 29.4 PG (ref 27–31)
MCHC RBC AUTO-ENTMCNC: 32 G/DL (ref 32–36)
MCV RBC AUTO: 92 FL (ref 82–98)
MONOCYTES # BLD AUTO: 0.6 K/UL (ref 0.3–1)
MONOCYTES NFR BLD: 7.3 % (ref 4–15)
NEUTROPHILS # BLD AUTO: 5.6 K/UL (ref 1.8–7.7)
NEUTROPHILS NFR BLD: 73.4 % (ref 38–73)
NRBC BLD-RTO: 0 /100 WBC
PLATELET # BLD AUTO: 291 K/UL (ref 150–350)
PMV BLD AUTO: 9 FL (ref 9.2–12.9)
POTASSIUM SERPL-SCNC: 3.5 MMOL/L (ref 3.5–5.1)
PROT SERPL-MCNC: 5.7 G/DL (ref 6–8.4)
RBC # BLD AUTO: 3.61 M/UL (ref 4–5.4)
SATURATED IRON: 15 % (ref 20–50)
SODIUM SERPL-SCNC: 138 MMOL/L (ref 136–145)
TOTAL IRON BINDING CAPACITY: 434 UG/DL (ref 250–450)
TRANSFERRIN SERPL-MCNC: 293 MG/DL (ref 200–375)
WBC # BLD AUTO: 7.64 K/UL (ref 3.9–12.7)

## 2020-10-19 PROCEDURE — 99214 OFFICE O/P EST MOD 30 MIN: CPT | Mod: S$GLB,,, | Performed by: NURSE PRACTITIONER

## 2020-10-19 PROCEDURE — 99999 PR PBB SHADOW E&M-EST. PATIENT-LVL III: CPT | Mod: PBBFAC,,, | Performed by: NURSE PRACTITIONER

## 2020-10-19 PROCEDURE — 85025 COMPLETE CBC W/AUTO DIFF WBC: CPT

## 2020-10-19 PROCEDURE — 80053 COMPREHEN METABOLIC PANEL: CPT

## 2020-10-19 PROCEDURE — 3008F BODY MASS INDEX DOCD: CPT | Mod: CPTII,S$GLB,, | Performed by: NURSE PRACTITIONER

## 2020-10-19 PROCEDURE — 83540 ASSAY OF IRON: CPT

## 2020-10-19 PROCEDURE — 82728 ASSAY OF FERRITIN: CPT

## 2020-10-19 PROCEDURE — 99214 PR OFFICE/OUTPT VISIT, EST, LEVL IV, 30-39 MIN: ICD-10-PCS | Mod: S$GLB,,, | Performed by: NURSE PRACTITIONER

## 2020-10-19 PROCEDURE — 36415 COLL VENOUS BLD VENIPUNCTURE: CPT

## 2020-10-19 PROCEDURE — 3008F PR BODY MASS INDEX (BMI) DOCUMENTED: ICD-10-PCS | Mod: CPTII,S$GLB,, | Performed by: NURSE PRACTITIONER

## 2020-10-19 PROCEDURE — 99999 PR PBB SHADOW E&M-EST. PATIENT-LVL III: ICD-10-PCS | Mod: PBBFAC,,, | Performed by: NURSE PRACTITIONER

## 2020-10-19 NOTE — Clinical Note
Please schedule injectafer weekly x 2 once approved (plan entered). Schedule CBC, ferritin and iron studies in 3 months with follow up visit with Dr. Jackson or NP

## 2020-10-19 NOTE — PROGRESS NOTES
"Hematology and Bone Marrow Transplant  Follow-up Visit     10/23/2020    Reason For Visit: Iron Deficiency Anemia      History of Present Ilness:   Lauren K Damico (Dar[place]) is a pleasant 35 y.o.female who presents for 3 month follow-up of iron deficiency anemia. Labs done in June 2020 showed a significant anemia with hemoglobin in the 6's with a ferritin of 3. She was given 1 unit PRBC and injectafer x 2 doses. 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 States she is taking a Prenatal vitamin with 18 mg iron. She denies any vaginal bleeding and no abdominal pain. States she just feels "uncomfortable" Reports fatigue but states she has been working alot. She reports some trouble sleeping and some sob. She has been relating these issues to her advancing pregnancy. She complains of nausea and is taking Zofran and eating small meals.     PAST MEDICAL HISTORY:   Past Medical History:   Diagnosis Date    Abnormal Pap smear 9/19/12    colpo/LEEP    Anemia     Anxiety     PCOS (polycystic ovarian syndrome)        PAST SURGICAL HISTORY:   Past Surgical History:   Procedure Laterality Date    CERVICAL BIOPSY  W/ LOOP ELECTRODE EXCISION  10-17-12    GALLBLADDER SURGERY      GASTRIC RESTRICTION SURGERY      gastric sleeve in 2018    WISDOM TOOTH EXTRACTION         PAST SOCIAL HISTORY:   reports that she has never smoked. She has never used smokeless tobacco. She reports current alcohol use of about 1.0 standard drinks of alcohol per week. She reports that she does not use drugs.    FAMILY HISTORY:  Family History   Problem Relation Age of Onset    Ovarian cancer Paternal Grandmother     Breast cancer Paternal Aunt     Ovarian cancer Paternal Aunt     Breast cancer Paternal Aunt     Uterine cancer Paternal Aunt     Colon cancer Neg Hx        CURRENT MEDICATIONS:   Current Outpatient Medications   Medication Sig    esomeprazole " (NEXIUM) 20 MG capsule Take 20 mg by mouth 2 (two) times daily.    iron,carb/vit C/vit B12/folic (IRON 100 PLUS ORAL)     ondansetron (ZOFRAN) 4 MG tablet Take 1 tablet (4 mg total) by mouth 2 (two) times daily.    PNV/iron/omega3/folic acid/min (PRENAT [id number] ORAL)     progesterone (PROMETRIUM) 200 MG capsule Take 1 capsule (200 mg total) by mouth every evening.    ondansetron (ZOFRAN) 4 MG tablet Take 1 tablet (4 mg total) by mouth daily as needed for Nausea.     No current facility-administered medications for this visit.      ALLERGIES:   Review of patient's allergies indicates:  No Known Allergies      Review of Systems:     Review of Systems   Constitutional: Positive for fatigue. Negative for appetite change, chills, diaphoresis, fever and unexpected weight change.   HENT:   Negative for hearing loss, mouth sores, nosebleeds, sore throat, trouble swallowing and voice change.    Eyes: Negative for eye problems and icterus.   Respiratory: Positive for shortness of breath. Negative for chest tightness, cough, hemoptysis and wheezing.    Cardiovascular: Negative for chest pain, leg swelling and palpitations.   Gastrointestinal: Positive for nausea. Negative for abdominal distention, abdominal pain, blood in stool, diarrhea and vomiting.   Endocrine: Negative for hot flashes.   Genitourinary: Negative for bladder incontinence, difficulty urinating, dysuria and hematuria.    Musculoskeletal: Negative for arthralgias, back pain, flank pain, gait problem, myalgias, neck pain and neck stiffness.   Skin: Negative for itching, rash and wound.   Neurological: Positive for light-headedness. Negative for dizziness, extremity weakness, gait problem, headaches, numbness, seizures and speech difficulty.   Hematological: Negative for adenopathy. Does not bruise/bleed easily.   Psychiatric/Behavioral: Negative for confusion, depression and sleep disturbance. The patient is not nervous/anxious.            Physical Exam:   Physical Exam   Constitutional: She is oriented to person, place, and time and well-developed, well-nourished, and in no distress. No distress.   HENT:   Mouth/Throat: Oropharyngeal exudate present.   Cardiovascular: Normal rate, regular rhythm and normal heart sounds.   Pulmonary/Chest: Effort normal and breath sounds normal. No respiratory distress.   Abdominal: Soft. Bowel sounds are normal. There is no abdominal tenderness.   Musculoskeletal:         General: Edema present.   Neurological: She is alert and oriented to person, place, and time.   Skin: Skin is warm and dry. No rash noted. She is not diaphoretic.   Psychiatric: Affect normal.       ECOG Performance Status: (foot note - ECOG PS provided by Eastern Cooperative Oncology Group) 1 - Symptomatic but completely ambulatory    Karnofsky Performance Score:  80%- Normal Activity with Effort: Some Symptoms of Disease    Labs:   Lab Results   Component Value Date    WBC 7.64 10/19/2020    HGB 10.6 (L) 10/19/2020    HCT 33.1 (L) 10/19/2020     10/19/2020    CHOL 181 10/04/2017    TRIG 87 10/04/2017    HDL 40 10/04/2017    ALT 10 10/19/2020    AST 11 10/19/2020     10/19/2020    K 3.5 10/19/2020     10/19/2020    CREATININE 0.5 10/19/2020    BUN 6 10/19/2020    CO2 24 10/19/2020    TSH 1.266 11/19/2019    GLUF 92 11/19/2019    HGBA1C 5.4 11/19/2019         Imaging: Previous imaging has been reviewed     Assessment and Plan:     Ms. Damico is a 35 year old female who is currently 36 weeks pregnant and has iron deficiency anemia.    Iron Deficiency Anemia  - denies vaginal bleeding or GI bleeding  --Transfused 1 unit of blood and received injectafer x 2 (received 7/17 and 7/24) for hgb 6.7 and ferritin of 3  --currently on prenatal vitamin with 18 mg of iron  --today hgb 10.6 and ferritin 5. Will give injectafer weekly x 2     3rd trimester pregnancy  --Currently 36 weeks pregnant with a girl  --Managed as per  OB    Disposition: Return to clinic in 3 months with cbc and iron studies      Lovely Gu NP  Hematology/Bone Marrow Transplant  [facility]

## 2020-10-20 ENCOUNTER — ROUTINE PRENATAL (OUTPATIENT)
Dept: OBSTETRICS AND GYNECOLOGY | Facility: CLINIC | Age: 35
End: 2020-10-20
Payer: COMMERCIAL

## 2020-10-20 VITALS
DIASTOLIC BLOOD PRESSURE: 60 MMHG | SYSTOLIC BLOOD PRESSURE: 100 MMHG | WEIGHT: 208.88 LBS | BODY MASS INDEX: 39.47 KG/M2

## 2020-10-20 DIAGNOSIS — Z3A.35 35 WEEKS GESTATION OF PREGNANCY: Primary | ICD-10-CM

## 2020-10-20 DIAGNOSIS — O21.9 NAUSEA AND VOMITING DURING PREGNANCY: ICD-10-CM

## 2020-10-20 PROCEDURE — 87081 CULTURE SCREEN ONLY: CPT

## 2020-10-20 PROCEDURE — 99999 PR PBB SHADOW E&M-EST. PATIENT-LVL III: CPT | Mod: PBBFAC,,, | Performed by: OBSTETRICS & GYNECOLOGY

## 2020-10-20 PROCEDURE — 0502F SUBSEQUENT PRENATAL CARE: CPT | Mod: CPTII,S$GLB,, | Performed by: OBSTETRICS & GYNECOLOGY

## 2020-10-20 PROCEDURE — 0502F PR SUBSEQUENT PRENATAL CARE: ICD-10-PCS | Mod: CPTII,S$GLB,, | Performed by: OBSTETRICS & GYNECOLOGY

## 2020-10-20 PROCEDURE — 99999 PR PBB SHADOW E&M-EST. PATIENT-LVL III: ICD-10-PCS | Mod: PBBFAC,,, | Performed by: OBSTETRICS & GYNECOLOGY

## 2020-10-20 RX ORDER — ONDANSETRON 4 MG/1
4 TABLET, FILM COATED ORAL DAILY PRN
Qty: 30 TABLET | Refills: 1 | Status: SHIPPED | OUTPATIENT
Start: 2020-10-20 | End: 2020-12-03

## 2020-10-20 NOTE — PROGRESS NOTES
HERE for routine OB visit at 35 5/7 wks, with NO complaints.  Denies vaginal bleeding, cramping/ ctx, or LOF.  + FM.  [facility] BID.  PTL precautions.  H/H/ 10/33.    GBBS, HIV RPR.  F/U in one week.

## 2020-10-23 LAB — BACTERIA SPEC AEROBE CULT: NORMAL

## 2020-10-23 RX ORDER — SODIUM CHLORIDE 0.9 % (FLUSH) 0.9 %
10 SYRINGE (ML) INJECTION
Status: CANCELLED | OUTPATIENT
Start: 2020-10-30

## 2020-10-23 RX ORDER — SODIUM CHLORIDE 0.9 % (FLUSH) 0.9 %
10 SYRINGE (ML) INJECTION
Status: CANCELLED | OUTPATIENT
Start: 2020-11-10

## 2020-10-23 RX ORDER — HEPARIN 100 UNIT/ML
500 SYRINGE INTRAVENOUS
Status: CANCELLED | OUTPATIENT
Start: 2020-11-10

## 2020-10-23 RX ORDER — HEPARIN 100 UNIT/ML
500 SYRINGE INTRAVENOUS
Status: CANCELLED | OUTPATIENT
Start: 2020-10-30

## 2020-10-29 ENCOUNTER — LAB VISIT (OUTPATIENT)
Dept: LAB | Facility: OTHER | Age: 35
End: 2020-10-29
Attending: OBSTETRICS & GYNECOLOGY
Payer: COMMERCIAL

## 2020-10-29 ENCOUNTER — ROUTINE PRENATAL (OUTPATIENT)
Dept: OBSTETRICS AND GYNECOLOGY | Facility: CLINIC | Age: 35
End: 2020-10-29
Payer: COMMERCIAL

## 2020-10-29 ENCOUNTER — HOSPITAL ENCOUNTER (EMERGENCY)
Facility: OTHER | Age: 35
Discharge: HOME | End: 2020-10-29
Attending: OBSTETRICS & GYNECOLOGY
Payer: COMMERCIAL

## 2020-10-29 VITALS
HEART RATE: 106 BPM | DIASTOLIC BLOOD PRESSURE: 56 MMHG | SYSTOLIC BLOOD PRESSURE: 107 MMHG | RESPIRATION RATE: 16 BRPM | OXYGEN SATURATION: 95 % | TEMPERATURE: 99 F

## 2020-10-29 VITALS — DIASTOLIC BLOOD PRESSURE: 70 MMHG | WEIGHT: 203.5 LBS | SYSTOLIC BLOOD PRESSURE: 116 MMHG | BODY MASS INDEX: 38.45 KG/M2

## 2020-10-29 DIAGNOSIS — Z3A.36 36 WEEKS GESTATION OF PREGNANCY: Primary | ICD-10-CM

## 2020-10-29 DIAGNOSIS — Z3A.36 36 WEEKS GESTATION OF PREGNANCY: ICD-10-CM

## 2020-10-29 DIAGNOSIS — O21.9 NAUSEA AND VOMITING DURING PREGNANCY: ICD-10-CM

## 2020-10-29 DIAGNOSIS — E86.0 DEHYDRATION: ICD-10-CM

## 2020-10-29 LAB
ALBUMIN SERPL BCP-MCNC: 2.7 G/DL (ref 3.5–5.2)
ALP SERPL-CCNC: 149 U/L (ref 55–135)
ALT SERPL W/O P-5'-P-CCNC: 10 U/L (ref 10–44)
ANION GAP SERPL CALC-SCNC: 12 MMOL/L (ref 8–16)
AST SERPL-CCNC: 12 U/L (ref 10–40)
BACTERIA #/AREA URNS HPF: ABNORMAL /HPF
BASOPHILS # BLD AUTO: 0.02 K/UL (ref 0–0.2)
BASOPHILS NFR BLD: 0.2 % (ref 0–1.9)
BILIRUB SERPL-MCNC: 0.6 MG/DL (ref 0.1–1)
BILIRUB UR QL STRIP: NEGATIVE
BUN SERPL-MCNC: 5 MG/DL (ref 6–20)
CALCIUM SERPL-MCNC: 8.4 MG/DL (ref 8.7–10.5)
CHLORIDE SERPL-SCNC: 109 MMOL/L (ref 95–110)
CLARITY UR: CLEAR
CO2 SERPL-SCNC: 17 MMOL/L (ref 23–29)
COLOR UR: YELLOW
CREAT SERPL-MCNC: 0.5 MG/DL (ref 0.5–1.4)
DIFFERENTIAL METHOD: ABNORMAL
EOSINOPHIL # BLD AUTO: 0 K/UL (ref 0–0.5)
EOSINOPHIL NFR BLD: 0.3 % (ref 0–8)
ERYTHROCYTE [DISTWIDTH] IN BLOOD BY AUTOMATED COUNT: 14.6 % (ref 11.5–14.5)
EST. GFR  (AFRICAN AMERICAN): >60 ML/MIN/1.73 M^2
EST. GFR  (NON AFRICAN AMERICAN): >60 ML/MIN/1.73 M^2
GLUCOSE SERPL-MCNC: 82 MG/DL (ref 70–110)
GLUCOSE UR QL STRIP: NEGATIVE
HCT VFR BLD AUTO: 35.5 % (ref 37–48.5)
HGB BLD-MCNC: 11.8 G/DL (ref 12–16)
HGB UR QL STRIP: ABNORMAL
HIV 1+2 AB+HIV1 P24 AG SERPL QL IA: NEGATIVE
IMM GRANULOCYTES # BLD AUTO: 0.09 K/UL (ref 0–0.04)
IMM GRANULOCYTES NFR BLD AUTO: 0.7 % (ref 0–0.5)
KETONES UR QL STRIP: ABNORMAL
LEUKOCYTE ESTERASE UR QL STRIP: NEGATIVE
LYMPHOCYTES # BLD AUTO: 1.4 K/UL (ref 1–4.8)
LYMPHOCYTES NFR BLD: 11.7 % (ref 18–48)
MCH RBC QN AUTO: 29.5 PG (ref 27–31)
MCHC RBC AUTO-ENTMCNC: 33.2 G/DL (ref 32–36)
MCV RBC AUTO: 89 FL (ref 82–98)
MICROSCOPIC COMMENT: ABNORMAL
MONOCYTES # BLD AUTO: 0.8 K/UL (ref 0.3–1)
MONOCYTES NFR BLD: 6.8 % (ref 4–15)
NEUTROPHILS # BLD AUTO: 9.9 K/UL (ref 1.8–7.7)
NEUTROPHILS NFR BLD: 80.3 % (ref 38–73)
NITRITE UR QL STRIP: NEGATIVE
NRBC BLD-RTO: 0 /100 WBC
PH UR STRIP: 6 [PH] (ref 5–8)
PLATELET # BLD AUTO: 331 K/UL (ref 150–350)
PMV BLD AUTO: 9 FL (ref 9.2–12.9)
POTASSIUM SERPL-SCNC: 3.7 MMOL/L (ref 3.5–5.1)
PROT SERPL-MCNC: 6 G/DL (ref 6–8.4)
PROT UR QL STRIP: ABNORMAL
RBC # BLD AUTO: 4 M/UL (ref 4–5.4)
RBC #/AREA URNS HPF: 4 /HPF (ref 0–4)
SARS-COV-2 RDRP RESP QL NAA+PROBE: NEGATIVE
SODIUM SERPL-SCNC: 138 MMOL/L (ref 136–145)
SP GR UR STRIP: >=1.03 (ref 1–1.03)
SQUAMOUS #/AREA URNS HPF: 5 /HPF
URN SPEC COLLECT METH UR: ABNORMAL
UROBILINOGEN UR STRIP-ACNC: NEGATIVE EU/DL
WBC # BLD AUTO: 12.35 K/UL (ref 3.9–12.7)
WBC #/AREA URNS HPF: 2 /HPF (ref 0–5)

## 2020-10-29 PROCEDURE — 86592 SYPHILIS TEST NON-TREP QUAL: CPT | Mod: 91

## 2020-10-29 PROCEDURE — 86703 HIV-1/HIV-2 1 RESULT ANTBDY: CPT

## 2020-10-29 PROCEDURE — 63600175 PHARM REV CODE 636 W HCPCS: Performed by: STUDENT IN AN ORGANIZED HEALTH CARE EDUCATION/TRAINING PROGRAM

## 2020-10-29 PROCEDURE — 59025 FETAL NON-STRESS TEST: CPT | Mod: 26,,, | Performed by: OBSTETRICS & GYNECOLOGY

## 2020-10-29 PROCEDURE — 99284 EMERGENCY DEPT VISIT MOD MDM: CPT | Mod: 25,,, | Performed by: OBSTETRICS & GYNECOLOGY

## 2020-10-29 PROCEDURE — 86592 SYPHILIS TEST NON-TREP QUAL: CPT

## 2020-10-29 PROCEDURE — 96375 TX/PRO/DX INJ NEW DRUG ADDON: CPT

## 2020-10-29 PROCEDURE — 80053 COMPREHEN METABOLIC PANEL: CPT

## 2020-10-29 PROCEDURE — 0502F SUBSEQUENT PRENATAL CARE: CPT | Mod: CPTII,S$GLB,, | Performed by: OBSTETRICS & GYNECOLOGY

## 2020-10-29 PROCEDURE — 86703 HIV-1/HIV-2 1 RESULT ANTBDY: CPT | Mod: 91

## 2020-10-29 PROCEDURE — 99284 PR EMERGENCY DEPT VISIT,LEVEL IV: ICD-10-PCS | Mod: 25,,, | Performed by: OBSTETRICS & GYNECOLOGY

## 2020-10-29 PROCEDURE — U0002 COVID-19 LAB TEST NON-CDC: HCPCS

## 2020-10-29 PROCEDURE — 59025 PR FETAL 2N-STRESS TEST: ICD-10-PCS | Mod: 26,,, | Performed by: OBSTETRICS & GYNECOLOGY

## 2020-10-29 PROCEDURE — 99999 PR PBB SHADOW E&M-EST. PATIENT-LVL III: CPT | Mod: PBBFAC,,, | Performed by: OBSTETRICS & GYNECOLOGY

## 2020-10-29 PROCEDURE — 85025 COMPLETE CBC W/AUTO DIFF WBC: CPT

## 2020-10-29 PROCEDURE — 99999 PR PBB SHADOW E&M-EST. PATIENT-LVL III: ICD-10-PCS | Mod: PBBFAC,,, | Performed by: OBSTETRICS & GYNECOLOGY

## 2020-10-29 PROCEDURE — 25000003 PHARM REV CODE 250: Performed by: STUDENT IN AN ORGANIZED HEALTH CARE EDUCATION/TRAINING PROGRAM

## 2020-10-29 PROCEDURE — 81000 URINALYSIS NONAUTO W/SCOPE: CPT

## 2020-10-29 PROCEDURE — 99284 EMERGENCY DEPT VISIT MOD MDM: CPT | Mod: 25

## 2020-10-29 PROCEDURE — 96361 HYDRATE IV INFUSION ADD-ON: CPT

## 2020-10-29 PROCEDURE — 96365 THER/PROPH/DIAG IV INF INIT: CPT

## 2020-10-29 PROCEDURE — 36415 COLL VENOUS BLD VENIPUNCTURE: CPT

## 2020-10-29 PROCEDURE — 0502F PR SUBSEQUENT PRENATAL CARE: ICD-10-PCS | Mod: CPTII,S$GLB,, | Performed by: OBSTETRICS & GYNECOLOGY

## 2020-10-29 RX ORDER — ONDANSETRON 2 MG/ML
4 INJECTION INTRAMUSCULAR; INTRAVENOUS ONCE
Status: COMPLETED | OUTPATIENT
Start: 2020-10-29 | End: 2020-10-29

## 2020-10-29 RX ADMIN — SODIUM CHLORIDE, SODIUM LACTATE, POTASSIUM CHLORIDE, AND CALCIUM CHLORIDE 1000 ML: .6; .31; .03; .02 INJECTION, SOLUTION INTRAVENOUS at 06:10

## 2020-10-29 RX ADMIN — SODIUM CHLORIDE, SODIUM LACTATE, POTASSIUM CHLORIDE, AND CALCIUM CHLORIDE 1000 ML: .6; .31; .03; .02 INJECTION, SOLUTION INTRAVENOUS at 03:10

## 2020-10-29 RX ADMIN — PROMETHAZINE HYDROCHLORIDE 12.5 MG: 25 INJECTION INTRAMUSCULAR; INTRAVENOUS at 07:10

## 2020-10-29 RX ADMIN — ONDANSETRON 4 MG: 2 INJECTION INTRAMUSCULAR; INTRAVENOUS at 04:10

## 2020-10-29 NOTE — ED PROVIDER NOTES
Encounter Date: 10/29/2020       History     Chief Complaint   Patient presents with    Dehydration   Lauren K Damico is a 35 y.o. F at 36w3d presents complaining of nausea and vomiting. The patient reports that she has not been able to tolerate any food since yesterday around 11 AM. States she has only tolerated a few sips of gatorade. States she felt shaky and hot with sweating last night and today. Endorses right lower back pain as well. Denies fever, dysuria, hematuria, vaginal discharge, or sick contacts.   This IUP is complicated by anemia, history of gastric sleeve surgery in 2018.  Patient denies contractions, denies vaginal bleeding, denies LOF.   Fetal Movement: decreased.       HPI  Review of patient's allergies indicates:  No Known Allergies  Past Medical History:   Diagnosis Date    Abnormal Pap smear 12    colpo/LEEP    Anemia     Anxiety     PCOS (polycystic ovarian syndrome)      Past Surgical History:   Procedure Laterality Date    CERVICAL BIOPSY  W/ LOOP ELECTRODE EXCISION  10-17-12    GALLBLADDER SURGERY      GASTRIC RESTRICTION SURGERY      gastric sleeve in 2018    WISDOM TOOTH EXTRACTION       Family History   Problem Relation Age of Onset    Ovarian cancer Paternal Grandmother     Breast cancer Paternal Aunt     Ovarian cancer Paternal Aunt     Breast cancer Paternal Aunt     Uterine cancer Paternal Aunt     Colon cancer Neg Hx      Social History     Tobacco Use    Smoking status: Never Smoker    Smokeless tobacco: Never Used   Substance Use Topics    Alcohol use: Yes     Alcohol/week: 1.0 standard drinks     Types: 1 Glasses of wine per week     Frequency: Never     Drinks per session: 1 or 2     Binge frequency: Never    Drug use: No     Review of Systems   Constitutional: Positive for chills. Negative for fatigue and fever.   HENT: Negative for congestion, rhinorrhea and sore throat.    Eyes: Negative for visual disturbance.   Respiratory: Negative for cough,  chest tightness and shortness of breath.    Cardiovascular: Negative for chest pain and leg swelling.   Gastrointestinal: Positive for nausea and vomiting. Negative for abdominal pain.   Genitourinary: Negative for dysuria, flank pain, pelvic pain, vaginal bleeding and vaginal discharge.   Musculoskeletal: Positive for back pain (Right lower back).   Skin: Negative for rash.   Neurological: Negative for dizziness and headaches.       Physical Exam     Initial Vitals [10/29/20 1451]   BP Pulse Resp Temp SpO2   114/62 86 16 98.5 °F (36.9 °C) 99 %      MAP       --         Physical Exam    Vitals reviewed.  Constitutional: She appears well-developed and well-nourished. She is not diaphoretic. No distress.   HENT:   Head: Normocephalic and atraumatic.   Eyes: Conjunctivae and EOM are normal.   Neck: Normal range of motion. Neck supple.   Cardiovascular: Normal rate.   Pulmonary/Chest: No respiratory distress.   Normal work of breathing   Abdominal: Soft. There is no abdominal tenderness. There is no rebound and no guarding.   Gravid uterus   Genitourinary:    Uterus normal.     Musculoskeletal: Normal range of motion. No tenderness or edema.   Neurological: She is alert and oriented to person, place, and time.   Skin: Skin is warm and dry.   Psychiatric: She has a normal mood and affect. Thought content normal.     OB LABOR EXAM:             Dilatation: 0.   Station: -5.   Effacement: 40%.             ED Course   Obtain Fetal nonstress test (NST)    Date/Time: 10/29/2020 4:57 PM  Performed by: Aleja Kapadia MD  Authorized by: Kamar Macias MD     Nonstress Test:     Variability:  6-25 BPM    Decelerations:  None    Accelerations:  15 bpm    Acoustic Stimulator: No      Baseline:  125    Uterine Irritability: Yes      Contractions:  Irregular    Contraction Frequency:  Q4-8 min  Biophysical Profile:     Nonstress Test Interpretation: reactive      Overall Impression:  Reassuring      Labs Reviewed   CBC W/ AUTO  DIFFERENTIAL - Abnormal; Notable for the following components:       Result Value    Hemoglobin 11.8 (*)     Hematocrit 35.5 (*)     RDW 14.6 (*)     MPV 9.0 (*)     Immature Granulocytes 0.7 (*)     Gran # (ANC) 9.9 (*)     Immature Grans (Abs) 0.09 (*)     Gran % 80.3 (*)     Lymph % 11.7 (*)     All other components within normal limits   COMPREHENSIVE METABOLIC PANEL - Abnormal; Notable for the following components:    CO2 17 (*)     BUN 5 (*)     Calcium 8.4 (*)     Albumin 2.7 (*)     Alkaline Phosphatase 149 (*)     All other components within normal limits   URINALYSIS, REFLEX TO URINE CULTURE - Abnormal; Notable for the following components:    Specific Gravity, UA >=1.030 (*)     Protein, UA Trace (*)     Ketones, UA 3+ (*)     Occult Blood UA 1+ (*)     All other components within normal limits    Narrative:     Specimen Source->Urine   URINALYSIS MICROSCOPIC - Abnormal; Notable for the following components:    Bacteria Few (*)     All other components within normal limits    Narrative:     Specimen Source->Urine   HIV 1 / 2 ANTIBODY   RPR   SARS-COV-2 RNA AMPLIFICATION, QUAL          Imaging Results    None          Medical Decision Making:   ED Management:  VSS, afebrile  Tachycardic to 100s  CBC: wnl  CMP: wnl   UA: +3 ketones, high specific gravity  Giving 2L NS --> improvement in HR  Zofran and Phenergan provided, patient reports improvement in nausea  COVID test negative. Tolerated po challenge with crackers.   Patient stable for discharge home. Labor and pre eclampsia precautions given. Patient verbalized understanding, all questions answered. Plan discussed with hospitalist on call, who is in agreement with plan.     Other:   I have discussed this case with another health care provider.              Attending Attestation:   Physician Attestation Statement for Resident:  As the supervising MD   Physician Attestation Statement: I have personally seen and examined this patient.   I agree with the  above history. -:   As the supervising MD I agree with the above PE.    As the supervising MD I agree with the above treatment, course, plan, and disposition.   -: Patient evaluated and found to be stable, agree with resident's assessment and plan.  I was personally present during the critical portions of the procedure(s) performed by the resident and was immediately available in the ED to provide services and assistance as needed during the entire procedure.  I have reviewed and agree with the residents interpretation of the following: lab data.  I have reviewed the following: old records at this facility.                              Clinical Impression:     ICD-10-CM ICD-9-CM   1. 36 weeks gestation of pregnancy  Z3A.36 V22.2   2. Dehydration  E86.0 276.51   3. Nausea and vomiting during pregnancy  O21.9 643.90                      Disposition:   Disposition: Discharged  Condition: Stable     ED Disposition Condition    Discharge Stable        ED Prescriptions     None        Follow-up Information    None                                      Kamar Macias MD  Resident  11/04/20 1232       Alyssa Barr MD  11/04/20 0977

## 2020-10-29 NOTE — PROGRESS NOTES
HERE for routine OB visit at 36 3/7 wks, with  N/V.  She has taken in food for over 24 hours.  She is not tolerating water.  Minimal gatoraide.  She is seriously stressed. She is shaking at night.  Denies vaginal bleeding, cramping/ ctx, or LOF.  Decreased  FM.  [facility] BID.  WIll go to OB ED- will go for IVF, NST EVAN.  Will excuse from work until Monday. Rest and hydration.  F/U 1 wk

## 2020-10-30 LAB
HIV 1+2 AB+HIV1 P24 AG SERPL QL IA: NEGATIVE
RPR SER QL: NORMAL
RPR SER QL: NORMAL

## 2020-10-30 NOTE — DISCHARGE INSTRUCTIONS
Call or present to OB ED or Labor and Delivery [phone] for any of the following:     - Leakage of fluid like your water broke   - Vaginal bleeding   - Not feeling baby move normally   - Contractions 3-5 minutes apart for 2 hours or more  - Unable to tolerate PO food or liquids for 24 hours  - Headache unrelieved by Tylenol, blurry vision, or epigastric pain

## 2020-11-05 ENCOUNTER — TELEPHONE (OUTPATIENT)
Dept: OBSTETRICS AND GYNECOLOGY | Facility: CLINIC | Age: 35
End: 2020-11-05

## 2020-11-05 ENCOUNTER — ROUTINE PRENATAL (OUTPATIENT)
Dept: OBSTETRICS AND GYNECOLOGY | Facility: CLINIC | Age: 35
End: 2020-11-05
Payer: COMMERCIAL

## 2020-11-05 ENCOUNTER — PATIENT MESSAGE (OUTPATIENT)
Dept: HEMATOLOGY/ONCOLOGY | Facility: CLINIC | Age: 35
End: 2020-11-05

## 2020-11-05 VITALS
WEIGHT: 207.88 LBS | SYSTOLIC BLOOD PRESSURE: 118 MMHG | DIASTOLIC BLOOD PRESSURE: 72 MMHG | BODY MASS INDEX: 39.28 KG/M2

## 2020-11-05 DIAGNOSIS — O21.9 NAUSEA/VOMITING IN PREGNANCY: ICD-10-CM

## 2020-11-05 DIAGNOSIS — Z34.00 SUPERVISION OF NORMAL FIRST PREGNANCY, ANTEPARTUM: Primary | ICD-10-CM

## 2020-11-05 DIAGNOSIS — Z3A.37 37 WEEKS GESTATION OF PREGNANCY: Primary | ICD-10-CM

## 2020-11-05 PROCEDURE — 99999 PR PBB SHADOW E&M-EST. PATIENT-LVL III: ICD-10-PCS | Mod: PBBFAC,,, | Performed by: NURSE PRACTITIONER

## 2020-11-05 PROCEDURE — 99999 PR PBB SHADOW E&M-EST. PATIENT-LVL III: CPT | Mod: PBBFAC,,, | Performed by: NURSE PRACTITIONER

## 2020-11-05 PROCEDURE — 0502F SUBSEQUENT PRENATAL CARE: CPT | Mod: CPTII,S$GLB,, | Performed by: NURSE PRACTITIONER

## 2020-11-05 PROCEDURE — 0502F PR SUBSEQUENT PRENATAL CARE: ICD-10-PCS | Mod: CPTII,S$GLB,, | Performed by: NURSE PRACTITIONER

## 2020-11-05 RX ORDER — PROMETHAZINE HYDROCHLORIDE 12.5 MG/1
12.5 TABLET ORAL EVERY 6 HOURS PRN
Qty: 30 TABLET | Refills: 0 | Status: SHIPPED | OUTPATIENT
Start: 2020-11-05 | End: 2020-12-03

## 2020-11-05 NOTE — PROGRESS NOTES
Here for routine OB appt at 37w3d, with complaints of continued N/V- worse at night.  RX for Phenergan sent.  Reports good FM.  Denies LOF, denies VB, and reports irregular contractions.  She will scheduled 1 more iron transfusion before delivery.   Reviewed warning signs of Labor and Preeclampsia.  Daily FM counts reinforced.  Peds- [facility]. Plans to breastfeed- has pump.   She desires induction at 39 weeks.   F/U scheduled 1 week

## 2020-11-05 NOTE — TELEPHONE ENCOUNTER
----- Message from Destiny Cleveland NP sent at 11/5/2020 10:08 AM CST -----  Pt desires 39 week induction.  Cervix is 0/50/-4. She is currently 37w3d.   Thank you!  Destiny

## 2020-11-09 ENCOUNTER — INFUSION (OUTPATIENT)
Dept: INFUSION THERAPY | Facility: HOSPITAL | Age: 35
End: 2020-11-09
Payer: COMMERCIAL

## 2020-11-09 ENCOUNTER — PATIENT MESSAGE (OUTPATIENT)
Dept: OBSTETRICS AND GYNECOLOGY | Facility: CLINIC | Age: 35
End: 2020-11-09

## 2020-11-09 VITALS
SYSTOLIC BLOOD PRESSURE: 106 MMHG | DIASTOLIC BLOOD PRESSURE: 66 MMHG | TEMPERATURE: 99 F | RESPIRATION RATE: 16 BRPM | OXYGEN SATURATION: 99 % | HEART RATE: 88 BPM

## 2020-11-09 DIAGNOSIS — D50.8 IRON DEFICIENCY ANEMIA SECONDARY TO INADEQUATE DIETARY IRON INTAKE: Primary | ICD-10-CM

## 2020-11-09 PROCEDURE — 96365 THER/PROPH/DIAG IV INF INIT: CPT

## 2020-11-09 PROCEDURE — 25000003 PHARM REV CODE 250: Performed by: NURSE PRACTITIONER

## 2020-11-09 PROCEDURE — 63600175 PHARM REV CODE 636 W HCPCS: Mod: JG | Performed by: NURSE PRACTITIONER

## 2020-11-09 RX ORDER — HEPARIN 100 UNIT/ML
500 SYRINGE INTRAVENOUS
Status: DISCONTINUED | OUTPATIENT
Start: 2020-11-09 | End: 2020-11-09 | Stop reason: HOSPADM

## 2020-11-09 RX ORDER — SODIUM CHLORIDE 0.9 % (FLUSH) 0.9 %
10 SYRINGE (ML) INJECTION
Status: DISCONTINUED | OUTPATIENT
Start: 2020-11-09 | End: 2020-11-09 | Stop reason: HOSPADM

## 2020-11-09 RX ADMIN — SODIUM CHLORIDE: 9 INJECTION, SOLUTION INTRAVENOUS at 03:11

## 2020-11-09 RX ADMIN — FERRIC CARBOXYMALTOSE INJECTION 750 MG: 50 INJECTION, SOLUTION INTRAVENOUS at 03:11

## 2020-11-09 NOTE — PLAN OF CARE
Pt ambulatory to clinic alone for Injectafer infusion. Denies any sig complaints. PIV started without difficulty. Pt tolerated infusion well. Observed for 45 minutes after. No signs/symptoms of adverse reaction. PIV removed with catheter intact. Ambulatory from clinic in [facility].

## 2020-11-13 ENCOUNTER — ROUTINE PRENATAL (OUTPATIENT)
Dept: OBSTETRICS AND GYNECOLOGY | Facility: CLINIC | Age: 35
End: 2020-11-13
Payer: COMMERCIAL

## 2020-11-13 VITALS
DIASTOLIC BLOOD PRESSURE: 76 MMHG | WEIGHT: 206.44 LBS | SYSTOLIC BLOOD PRESSURE: 116 MMHG | BODY MASS INDEX: 39.01 KG/M2

## 2020-11-13 DIAGNOSIS — Z98.84 HISTORY OF GASTRIC BYPASS: ICD-10-CM

## 2020-11-13 DIAGNOSIS — Z3A.38 38 WEEKS GESTATION OF PREGNANCY: Primary | ICD-10-CM

## 2020-11-13 DIAGNOSIS — O99.013 ANEMIA DURING PREGNANCY IN THIRD TRIMESTER: ICD-10-CM

## 2020-11-13 PROCEDURE — 99999 PR PBB SHADOW E&M-EST. PATIENT-LVL III: CPT | Mod: PBBFAC,,, | Performed by: OBSTETRICS & GYNECOLOGY

## 2020-11-13 PROCEDURE — 0502F SUBSEQUENT PRENATAL CARE: CPT | Mod: CPTII,S$GLB,, | Performed by: OBSTETRICS & GYNECOLOGY

## 2020-11-13 PROCEDURE — 0502F PR SUBSEQUENT PRENATAL CARE: ICD-10-PCS | Mod: CPTII,S$GLB,, | Performed by: OBSTETRICS & GYNECOLOGY

## 2020-11-13 PROCEDURE — 99999 PR PBB SHADOW E&M-EST. PATIENT-LVL III: ICD-10-PCS | Mod: PBBFAC,,, | Performed by: OBSTETRICS & GYNECOLOGY

## 2020-11-13 NOTE — PROGRESS NOTES
HISTORY AND PHYSICAL                                                OBSTETRICS          Subjective:       Lauren K Damico is a 35 y.o.  female with IUP at 39w1d weeks gestation who presents with history of severe anemia, requiring BLood transfusions and FE infusions.      Patient denies contractions, denies vaginal bleeding, denies LOF.   Fetal Movement: normal.    This IUP is complicated by O+, History of gastric sleeve 2018,  severe anemia-  S/P Blood rosenthal/ 2 Fe Infusions, following with ONC,  ON PNv with Iron    Review of Systems   ROS:   GENERAL: No fever, chills, fatigability or weight loss.  SKIN: No rashes, itching or changes in color or texture of skin.  HEAD: No headaches or recent head trauma.  EYES: Visual acuity fine. No photophobia, ocular pain or diplopia.  EARS: Denies ear pain, discharge or vertigo.  NOSE: No loss of smell, no epistaxis or postnasal drip.  MOUTH & THROAT: No hoarseness or change in voice. No excessive gum bleeding.  NODES: Denies swollen glands.  CHEST: Denies EAST, cyanosis, wheezing, cough and sputum production.  CARDIOVASCULAR: Denies chest pain, PND, orthopnea or reduced exercise tolerance.  ABDOMEN: Appetite fine. No weight loss. Denies diarrhea, abdominal pain, hematemesis or blood in stool.  URINARY: No flank pain, dysuria or hematuria.  PERIPHERAL VASCULAR: No claudication or cyanosis.  MUSCULOSKELETAL: No joint stiffness or swelling. Denies back pain.  NEUROLOGIC: No history of seizures, paralysis, alteration of gait or coordination.    PMHx:   Past Medical History:   Diagnosis Date    Abnormal Pap smear 12    colpo/LEEP    Anemia     Anxiety     PCOS (polycystic ovarian syndrome)        PSHx:   Past Surgical History:   Procedure Laterality Date    CERVICAL BIOPSY  W/ LOOP ELECTRODE EXCISION  10-17-12    GALLBLADDER SURGERY      GASTRIC RESTRICTION SURGERY      gastric sleeve in 2018    WISDOM TOOTH EXTRACTION         All: Review of patient's allergies  indicates:  No Known Allergies    Meds: (Not in a hospital admission)      SH:   Social History     Socioeconomic History    Marital status:      Spouse name: Not on file    Number of children: Not on file    Years of education: Not on file    Highest education level: Not on file   Occupational History    Not on file   Social Needs    Financial resource strain: Not hard at all    Food insecurity     Worry: Never true     Inability: Never true    Transportation needs     Medical: No     Non-medical: No   Tobacco Use    Smoking status: Never Smoker    Smokeless tobacco: Never Used   Substance and Sexual Activity    Alcohol use: Yes     Alcohol/week: 1.0 standard drinks     Types: 1 Glasses of wine per week     Frequency: Never     Drinks per session: 1 or 2     Binge frequency: Never    Drug use: No    Sexual activity: Yes     Partners: Male     Birth control/protection: None   Lifestyle    Physical activity     Days per week: 0 days     Minutes per session: 0 min    Stress: Only a little   Relationships    Social connections     Talks on phone: More than three times a week     Gets together: Three times a week     Attends Restorationist service: Not on file     Active member of club or organization: No     Attends meetings of clubs or organizations: Never     Relationship status:    Other Topics Concern    Not on file   Social History Narrative    Not on file       FH:   Family History   Problem Relation Age of Onset    Ovarian cancer Paternal Grandmother     Breast cancer Paternal Aunt     Ovarian cancer Paternal Aunt     Breast cancer Paternal Aunt     Uterine cancer Paternal Aunt     Colon cancer Neg Hx        OBHx:   OB History    Para Term  AB Living   1 0 0 0 0 0   SAB TAB Ectopic Multiple Live Births   0 0 0 0 0      # Outcome Date GA Lbr Cortez/2nd Weight Sex Delivery Anes PTL Lv   1 Current                Objective:       /76   Wt 93.6 kg (206 lb 7.4 oz)    LMP 12/12/2019 (Approximate)   BMI 39.01 kg/m²     Vitals:    11/13/20 0828   BP: 116/76   Weight: 93.6 kg (206 lb 7.4 oz)       General:   alert, appears stated age and cooperative, no apparent  distress   HENT:  normocephalic, atraumatic   Eyes:  extraocular movements and conjunctivae normal   Neck:  supple, range of motion normal, no thyromegaly   Lungs:   no respiratory distress   Heart:   regular rate   Abdomen:  soft, non-tender, non-distended but gravid, no rebound or guarding                    Cervix:     Dilation: 1cm    Effacement: 50%    Station:  -3    Consistency: medium    Position: middle   Sterile Speculum Exam:    EFW by Leopold's: 7 #       Lab Review  Blood Type O POS  GBBS: negative  Rubella: Immune  RPR: NR  HIV: negative  HepB: negative       Assessment:       39w1d weeks gestation with History of gastric sleeve 2018,  severe anemia-  S/P Blood rosenthal/ 2 Fe Infusions, following with ONC,  ON PNv with Iron    There are no hospital problems to display for this patient.         Plan:      Risks, benefits, alternatives and possible complications have been discussed in detail with the patient.   - Consents signed and to chart  - Admit to Labor and Delivery unit  - Epidural per Anesthesia  - Draw CBC, T&S  - Notify Staff  - Recheck in 2-4 hrs or PRN  - Maternal pelvis adequate    Post-Partum Hemorrhage risk - medium

## 2020-11-13 NOTE — PROGRESS NOTES
HERE for routine OB visit at 38 4/7 wks, with NO complaints.  Denies vaginal bleeding, has occasional cramping/ ctx, NO  LOF.  + FM.  [facility] BID.  Labor precautions.  She is scheduled for induction for 39 wks

## 2020-11-14 ENCOUNTER — LAB VISIT (OUTPATIENT)
Dept: URGENT CARE | Facility: CLINIC | Age: 35
End: 2020-11-14
Payer: COMMERCIAL

## 2020-11-14 DIAGNOSIS — Z3A.38 38 WEEKS GESTATION OF PREGNANCY: ICD-10-CM

## 2020-11-14 PROCEDURE — U0003 INFECTIOUS AGENT DETECTION BY NUCLEIC ACID (DNA OR RNA); SEVERE ACUTE RESPIRATORY SYNDROME CORONAVIRUS 2 (SARS-COV-2) (CORONAVIRUS DISEASE [COVID-19]), AMPLIFIED PROBE TECHNIQUE, MAKING USE OF HIGH THROUGHPUT TECHNOLOGIES AS DESCRIBED BY CMS-2020-01-R: HCPCS

## 2020-11-14 PROCEDURE — 99211 PR OFFICE/OUTPT VISIT, EST, LEVL I: ICD-10-PCS | Mod: S$GLB,,, | Performed by: FAMILY MEDICINE

## 2020-11-14 PROCEDURE — 99211 OFF/OP EST MAY X REQ PHY/QHP: CPT | Mod: S$GLB,,, | Performed by: FAMILY MEDICINE

## 2020-11-14 NOTE — PROGRESS NOTES
Subjective:       Patient ID: Lauren K Damico is a 35 y.o. female.    Chief Complaint: Preop Covid Swab    Preop covid swab.   Labeled with purple dot.     ROS     Objective:      Physical Exam    Assessment:       1. 38 weeks gestation of pregnancy        Plan:                   No follow-ups on file.

## 2020-11-16 LAB — SARS-COV-2 RNA RESP QL NAA+PROBE: NOT DETECTED

## 2020-11-17 ENCOUNTER — ANESTHESIA (OUTPATIENT)
Dept: OBSTETRICS AND GYNECOLOGY | Facility: OTHER | Age: 35
End: 2020-11-17
Payer: COMMERCIAL

## 2020-11-17 ENCOUNTER — HOSPITAL ENCOUNTER (INPATIENT)
Facility: OTHER | Age: 35
LOS: 4 days | Discharge: HOME | End: 2020-11-21
Attending: OBSTETRICS & GYNECOLOGY | Admitting: STUDENT IN AN ORGANIZED HEALTH CARE EDUCATION/TRAINING PROGRAM
Payer: COMMERCIAL

## 2020-11-17 ENCOUNTER — ANESTHESIA EVENT (OUTPATIENT)
Dept: OBSTETRICS AND GYNECOLOGY | Facility: OTHER | Age: 35
End: 2020-11-17
Payer: COMMERCIAL

## 2020-11-17 DIAGNOSIS — Z98.891 S/P CESAREAN SECTION: Primary | ICD-10-CM

## 2020-11-17 DIAGNOSIS — Z01.84 ANTIBODY RESPONSE EXAMINATION: ICD-10-CM

## 2020-11-17 DIAGNOSIS — Z3A.37 37 WEEKS GESTATION OF PREGNANCY: ICD-10-CM

## 2020-11-17 PROBLEM — Z98.84 H/O BARIATRIC SURGERY: Status: ACTIVE | Noted: 2020-11-17

## 2020-11-17 LAB
ABO + RH BLD: NORMAL
BASOPHILS # BLD AUTO: 0.01 K/UL (ref 0–0.2)
BASOPHILS NFR BLD: 0.1 % (ref 0–1.9)
BLD GP AB SCN CELLS X3 SERPL QL: NORMAL
DIFFERENTIAL METHOD: ABNORMAL
EOSINOPHIL # BLD AUTO: 0 K/UL (ref 0–0.5)
EOSINOPHIL NFR BLD: 0.5 % (ref 0–8)
ERYTHROCYTE [DISTWIDTH] IN BLOOD BY AUTOMATED COUNT: 14 % (ref 11.5–14.5)
HCT VFR BLD AUTO: 34.5 % (ref 37–48.5)
HGB BLD-MCNC: 11.3 G/DL (ref 12–16)
IMM GRANULOCYTES # BLD AUTO: 0.09 K/UL (ref 0–0.04)
IMM GRANULOCYTES NFR BLD AUTO: 1.2 % (ref 0–0.5)
LYMPHOCYTES # BLD AUTO: 1.5 K/UL (ref 1–4.8)
LYMPHOCYTES NFR BLD: 19.2 % (ref 18–48)
MCH RBC QN AUTO: 29.2 PG (ref 27–31)
MCHC RBC AUTO-ENTMCNC: 32.8 G/DL (ref 32–36)
MCV RBC AUTO: 89 FL (ref 82–98)
MONOCYTES # BLD AUTO: 0.6 K/UL (ref 0.3–1)
MONOCYTES NFR BLD: 7.7 % (ref 4–15)
NEUTROPHILS # BLD AUTO: 5.6 K/UL (ref 1.8–7.7)
NEUTROPHILS NFR BLD: 71.3 % (ref 38–73)
NRBC BLD-RTO: 0 /100 WBC
PLATELET # BLD AUTO: 306 K/UL (ref 150–350)
PMV BLD AUTO: 9.2 FL (ref 9.2–12.9)
RBC # BLD AUTO: 3.87 M/UL (ref 4–5.4)
WBC # BLD AUTO: 7.82 K/UL (ref 3.9–12.7)

## 2020-11-17 PROCEDURE — 85025 COMPLETE CBC W/AUTO DIFF WBC: CPT

## 2020-11-17 PROCEDURE — 11000001 HC ACUTE MED/SURG PRIVATE ROOM

## 2020-11-17 PROCEDURE — 86901 BLOOD TYPING SEROLOGIC RH(D): CPT

## 2020-11-17 PROCEDURE — 63600175 PHARM REV CODE 636 W HCPCS: Performed by: STUDENT IN AN ORGANIZED HEALTH CARE EDUCATION/TRAINING PROGRAM

## 2020-11-17 RX ORDER — SIMETHICONE 80 MG
1 TABLET,CHEWABLE ORAL 4 TIMES DAILY PRN
Status: DISCONTINUED | OUTPATIENT
Start: 2020-11-17 | End: 2020-11-19

## 2020-11-17 RX ORDER — SUCRALFATE 1 G/10ML
1 SUSPENSION ORAL EVERY 6 HOURS
Status: DISCONTINUED | OUTPATIENT
Start: 2020-11-18 | End: 2020-11-18

## 2020-11-17 RX ORDER — SODIUM CHLORIDE, SODIUM LACTATE, POTASSIUM CHLORIDE, CALCIUM CHLORIDE 600; 310; 30; 20 MG/100ML; MG/100ML; MG/100ML; MG/100ML
INJECTION, SOLUTION INTRAVENOUS CONTINUOUS
Status: DISCONTINUED | OUTPATIENT
Start: 2020-11-17 | End: 2020-11-19

## 2020-11-17 RX ORDER — ONDANSETRON 8 MG/1
8 TABLET, ORALLY DISINTEGRATING ORAL EVERY 8 HOURS PRN
Status: DISCONTINUED | OUTPATIENT
Start: 2020-11-17 | End: 2020-11-19

## 2020-11-17 RX ORDER — OXYTOCIN/RINGER'S LACTATE 30/500 ML
95 PLASTIC BAG, INJECTION (ML) INTRAVENOUS ONCE
Status: DISCONTINUED | OUTPATIENT
Start: 2020-11-17 | End: 2020-11-19

## 2020-11-17 RX ORDER — CALCIUM CARBONATE 200(500)MG
500 TABLET,CHEWABLE ORAL 3 TIMES DAILY PRN
Status: DISCONTINUED | OUTPATIENT
Start: 2020-11-17 | End: 2020-11-19

## 2020-11-17 RX ORDER — SODIUM CHLORIDE 9 MG/ML
INJECTION, SOLUTION INTRAVENOUS
Status: DISCONTINUED | OUTPATIENT
Start: 2020-11-17 | End: 2020-11-19

## 2020-11-17 RX ORDER — OXYTOCIN/RINGER'S LACTATE 30/500 ML
334 PLASTIC BAG, INJECTION (ML) INTRAVENOUS ONCE
Status: COMPLETED | OUTPATIENT
Start: 2020-11-17 | End: 2020-11-19

## 2020-11-17 RX ORDER — MAG HYDROX/ALUMINUM HYD/SIMETH 200-200-20
30 SUSPENSION, ORAL (FINAL DOSE FORM) ORAL
Status: DISCONTINUED | OUTPATIENT
Start: 2020-11-17 | End: 2020-11-18

## 2020-11-17 RX ORDER — OXYTOCIN/RINGER'S LACTATE 30/500 ML
2 PLASTIC BAG, INJECTION (ML) INTRAVENOUS CONTINUOUS
Status: DISCONTINUED | OUTPATIENT
Start: 2020-11-17 | End: 2020-11-19

## 2020-11-17 RX ADMIN — Medication 2 MILLI-UNITS/MIN: at 10:11

## 2020-11-17 RX ADMIN — SODIUM CHLORIDE, SODIUM LACTATE, POTASSIUM CHLORIDE, AND CALCIUM CHLORIDE: 600; 310; 30; 20 INJECTION, SOLUTION INTRAVENOUS at 09:11

## 2020-11-18 ENCOUNTER — RESEARCH ENCOUNTER (OUTPATIENT)
Dept: RESEARCH | Facility: HOSPITAL | Age: 35
End: 2020-11-18

## 2020-11-18 LAB — SARS-COV-2 RDRP RESP QL NAA+PROBE: NEGATIVE

## 2020-11-18 PROCEDURE — 01968 PR INSERT CATH,ART,PERCUT,SHORTTERM: ICD-10-PCS | Mod: QY,,, | Performed by: ANESTHESIOLOGY

## 2020-11-18 PROCEDURE — 11000001 HC ACUTE MED/SURG PRIVATE ROOM

## 2020-11-18 PROCEDURE — 36004724 HC OB OR TIME LEV III - 1ST 15 MIN: Performed by: STUDENT IN AN ORGANIZED HEALTH CARE EDUCATION/TRAINING PROGRAM

## 2020-11-18 PROCEDURE — 51702 INSERT TEMP BLADDER CATH: CPT

## 2020-11-18 PROCEDURE — 36004725 HC OB OR TIME LEV III - EA ADD 15 MIN: Performed by: STUDENT IN AN ORGANIZED HEALTH CARE EDUCATION/TRAINING PROGRAM

## 2020-11-18 PROCEDURE — 25000003 PHARM REV CODE 250: Performed by: STUDENT IN AN ORGANIZED HEALTH CARE EDUCATION/TRAINING PROGRAM

## 2020-11-18 PROCEDURE — 37000008 HC ANESTHESIA 1ST 15 MINUTES: Performed by: STUDENT IN AN ORGANIZED HEALTH CARE EDUCATION/TRAINING PROGRAM

## 2020-11-18 PROCEDURE — 25000003 PHARM REV CODE 250: Performed by: ANESTHESIOLOGY

## 2020-11-18 PROCEDURE — 63600175 PHARM REV CODE 636 W HCPCS: Performed by: ANESTHESIOLOGY

## 2020-11-18 PROCEDURE — 62326 NJX INTERLAMINAR LMBR/SAC: CPT | Performed by: ANESTHESIOLOGY

## 2020-11-18 PROCEDURE — C1751 CATH, INF, PER/CENT/MIDLINE: HCPCS | Performed by: ANESTHESIOLOGY

## 2020-11-18 PROCEDURE — 71000039 HC RECOVERY, EACH ADD'L HOUR: Performed by: STUDENT IN AN ORGANIZED HEALTH CARE EDUCATION/TRAINING PROGRAM

## 2020-11-18 PROCEDURE — 25000003 PHARM REV CODE 250

## 2020-11-18 PROCEDURE — 37000009 HC ANESTHESIA EA ADD 15 MINS: Performed by: STUDENT IN AN ORGANIZED HEALTH CARE EDUCATION/TRAINING PROGRAM

## 2020-11-18 PROCEDURE — 63600175 PHARM REV CODE 636 W HCPCS: Performed by: STUDENT IN AN ORGANIZED HEALTH CARE EDUCATION/TRAINING PROGRAM

## 2020-11-18 PROCEDURE — 01968 ANES/ANALG CS DLVR NEURAXIAL: CPT | Mod: QY,,, | Performed by: ANESTHESIOLOGY

## 2020-11-18 PROCEDURE — 59510 PRA FULL ROUT OBSTE CARE,CESAREAN DELIV: ICD-10-PCS | Mod: QY,,, | Performed by: ANESTHESIOLOGY

## 2020-11-18 PROCEDURE — 59510 CESAREAN DELIVERY: CPT | Mod: QY,,, | Performed by: ANESTHESIOLOGY

## 2020-11-18 PROCEDURE — 71000033 HC RECOVERY, INTIAL HOUR: Performed by: STUDENT IN AN ORGANIZED HEALTH CARE EDUCATION/TRAINING PROGRAM

## 2020-11-18 PROCEDURE — 27200710 HC EPIDURAL INFUSION PUMP SET: Performed by: ANESTHESIOLOGY

## 2020-11-18 PROCEDURE — U0002 COVID-19 LAB TEST NON-CDC: HCPCS

## 2020-11-18 RX ORDER — FENTANYL CITRATE 50 UG/ML
INJECTION, SOLUTION INTRAMUSCULAR; INTRAVENOUS
Status: COMPLETED
Start: 2020-11-18 | End: 2020-11-18

## 2020-11-18 RX ORDER — BUPIVACAINE HYDROCHLORIDE 2.5 MG/ML
INJECTION, SOLUTION EPIDURAL; INFILTRATION; INTRACAUDAL
Status: DISPENSED
Start: 2020-11-18 | End: 2020-11-18

## 2020-11-18 RX ORDER — FAMOTIDINE 10 MG/ML
20 INJECTION INTRAVENOUS ONCE
Status: COMPLETED | OUTPATIENT
Start: 2020-11-18 | End: 2020-11-18

## 2020-11-18 RX ORDER — FENTANYL/BUPIVACAINE/NS/PF 2MCG/ML-.1
PLASTIC BAG, INJECTION (ML) INJECTION CONTINUOUS PRN
Status: DISCONTINUED | OUTPATIENT
Start: 2020-11-18 | End: 2020-11-19

## 2020-11-18 RX ORDER — DIPHENHYDRAMINE HYDROCHLORIDE 50 MG/ML
25 INJECTION INTRAMUSCULAR; INTRAVENOUS ONCE
Status: COMPLETED | OUTPATIENT
Start: 2020-11-18 | End: 2020-11-18

## 2020-11-18 RX ORDER — FENTANYL/BUPIVACAINE/NS/PF 2MCG/ML-.1
PLASTIC BAG, INJECTION (ML) INJECTION
Status: DISCONTINUED
Start: 2020-11-18 | End: 2020-11-18 | Stop reason: WASHOUT

## 2020-11-18 RX ORDER — LIDOCAINE HYDROCHLORIDE AND EPINEPHRINE 15; 5 MG/ML; UG/ML
INJECTION, SOLUTION EPIDURAL
Status: DISCONTINUED | OUTPATIENT
Start: 2020-11-18 | End: 2020-11-19

## 2020-11-18 RX ORDER — FENTANYL CITRATE 50 UG/ML
INJECTION, SOLUTION INTRAMUSCULAR; INTRAVENOUS
Status: DISCONTINUED | OUTPATIENT
Start: 2020-11-18 | End: 2020-11-19

## 2020-11-18 RX ORDER — FENTANYL/BUPIVACAINE/NS/PF 2MCG/ML-.1
PLASTIC BAG, INJECTION (ML) INJECTION CONTINUOUS
Status: DISCONTINUED | OUTPATIENT
Start: 2020-11-18 | End: 2020-11-20

## 2020-11-18 RX ORDER — FENTANYL/BUPIVACAINE/NS/PF 2MCG/ML-.1
PLASTIC BAG, INJECTION (ML) INJECTION
Status: COMPLETED
Start: 2020-11-18 | End: 2020-11-18

## 2020-11-18 RX ORDER — FENTANYL CITRATE 50 UG/ML
INJECTION, SOLUTION INTRAMUSCULAR; INTRAVENOUS
Status: DISPENSED
Start: 2020-11-18 | End: 2020-11-18

## 2020-11-18 RX ORDER — SODIUM CITRATE AND CITRIC ACID MONOHYDRATE 334; 500 MG/5ML; MG/5ML
30 SOLUTION ORAL ONCE
Status: COMPLETED | OUTPATIENT
Start: 2020-11-18 | End: 2020-11-18

## 2020-11-18 RX ORDER — FAMOTIDINE 10 MG/ML
INJECTION INTRAVENOUS
Status: COMPLETED
Start: 2020-11-18 | End: 2020-11-18

## 2020-11-18 RX ADMIN — SODIUM CHLORIDE, SODIUM LACTATE, POTASSIUM CHLORIDE, AND CALCIUM CHLORIDE: 600; 310; 30; 20 INJECTION, SOLUTION INTRAVENOUS at 07:11

## 2020-11-18 RX ADMIN — SODIUM CHLORIDE, SODIUM LACTATE, POTASSIUM CHLORIDE, AND CALCIUM CHLORIDE: 600; 310; 30; 20 INJECTION, SOLUTION INTRAVENOUS at 11:11

## 2020-11-18 RX ADMIN — FENTANYL CITRATE 100 MCG: 50 INJECTION, SOLUTION INTRAMUSCULAR; INTRAVENOUS at 10:11

## 2020-11-18 RX ADMIN — ONDANSETRON 8 MG: 8 TABLET, ORALLY DISINTEGRATING ORAL at 12:11

## 2020-11-18 RX ADMIN — Medication 5 ML: at 10:11

## 2020-11-18 RX ADMIN — FAMOTIDINE 20 MG: 10 INJECTION INTRAVENOUS at 11:11

## 2020-11-18 RX ADMIN — LIDOCAINE HYDROCHLORIDE,EPINEPHRINE BITARTRATE 3 ML: 15; .005 INJECTION, SOLUTION EPIDURAL; INFILTRATION; INTRACAUDAL; PERINEURAL at 10:11

## 2020-11-18 RX ADMIN — DIPHENHYDRAMINE HYDROCHLORIDE 25 MG: 50 INJECTION, SOLUTION INTRAMUSCULAR; INTRAVENOUS at 09:11

## 2020-11-18 RX ADMIN — Medication 250 ML: at 11:11

## 2020-11-18 RX ADMIN — SODIUM CITRATE AND CITRIC ACID MONOHYDRATE 30 ML: 500; 334 SOLUTION ORAL at 11:11

## 2020-11-18 RX ADMIN — FAMOTIDINE 20 MG: 10 INJECTION INTRAVENOUS at 03:11

## 2020-11-18 RX ADMIN — LIDOCAINE HYDROCHLORIDE,EPINEPHRINE BITARTRATE 10 ML: 20; .005 INJECTION, SOLUTION EPIDURAL; INFILTRATION; INTRACAUDAL; PERINEURAL at 11:11

## 2020-11-18 RX ADMIN — Medication 10 ML: at 04:11

## 2020-11-18 RX ADMIN — FENTANYL CITRATE 100 MCG: 50 INJECTION, SOLUTION INTRAMUSCULAR; INTRAVENOUS at 11:11

## 2020-11-18 RX ADMIN — Medication 10 ML/HR: at 02:11

## 2020-11-18 NOTE — PROGRESS NOTES
LABOR NOTE    Lauren K Damico is a 35 y.o. 35 y.o.  at 39w1d GA who presented for IOL.    S: Complaints: None. Epidural working well.     O: BP (!) 99/56   Pulse 85   Temp 98.4 °F (36.9 °C) (Temporal)   Resp 18   Ht 5' (1.524 m)   Wt 93.6 kg (206 lb 7.4 oz)   LMP 2019 (Approximate)   SpO2 96%   Breastfeeding No   BMI 40.32 kg/m²     FHT: Baseline rate - 120 BPM. Moderate BTBV. No decelerations.   Category 1 tracing.  CTX: Every 2-5 minutes  SVE: ft/50/-3, membranes intact, balloon in place but not felt to be through internal os    A:   35 y.o.  at 39w1d, induction of labor with reassuring fetal heart tracing.     Active Hospital Problems    Diagnosis  POA    37 weeks gestation of pregnancy [Z3A.37]  Not Applicable    H/O bariatric surgery [Z98.84]  Not Applicable    Iron deficiency anemia [D50.9]  Yes      Resolved Hospital Problems   No resolved problems to display.     P:  TIMELINE:  Closed on admit, ctx too frequent for miso, balloon attempt unsuccessful  2200: balloon placed, ctx still too frequent, starting pitocin  0200: Balloon in place, no change  0400: attempted to check patient unable to tolerate despite epidural; redosed  0430: balloon removed, pit paused to attempt miso if ctx space  0500: Ctx did not space, pit restarted at 1/2     Plan:  Active labor management:  Balloon removed for full assessment now that pt is comfortable with epidural, post-leep cervix with very stenotic external os, feel would benefit from ripening.   Pitocin augmentation per protocol paused x 30 min to allow contractions to space and plan for miso if so, however ctx remained q 2 min and pit is restarted  Continue pitcocin augmentation per protocol  Continue close maternal and fetal monitoring  Recheck 2 hours or sooner if needed.    Aniket López M.D.  Obstetrics & Gynecology  PGY4

## 2020-11-18 NOTE — CARE UPDATE
OB Service Update    Will stop Pitocin augmentation at this time in hopes of stopping contraction activity. Will plan to place vaginal Cytotec in one hour for additional cervical ripening. Will also attempt balloon placement at that time. Anesthesia addressing epidural.     Christian Dominguez M.D.  PGY-4 OB/GYN  [facility]

## 2020-11-18 NOTE — PROGRESS NOTES
LABOR NOTE    Lauren K Damico is a 35 y.o. 35 y.o.  at 39w1d GA who presented for IOL.    S: Complaints: None. Epidural working well.     O: BP (!) 103/58   Pulse 79   Temp 98.2 °F (36.8 °C) (Oral)   Resp 16   Ht 5' (1.524 m)   Wt 93.6 kg (206 lb 7.4 oz)   LMP 2019 (Approximate)   SpO2 97%   Breastfeeding No   BMI 40.32 kg/m²     FHT: Baseline rate - 130 BPM. Moderate BTBV. No decelerations.   Category 1 tracing.  CTX: Irregular  SVE: 2/80/-3, membranes intact, balloon placed without complication    A:   35 y.o.  at 39w1d, induction of labor with reassuring fetal heart tracing.     Active Hospital Problems    Diagnosis  POA    37 weeks gestation of pregnancy [Z3A.37]  Not Applicable    H/O bariatric surgery [Z98.84]  Not Applicable    Iron deficiency anemia [D50.9]  Yes      Resolved Hospital Problems   No resolved problems to display.     P:  TIMELINE:  Closed on admit, ctx too frequent for miso, balloon attempt unsuccessful  2200: balloon placed, ctx still too frequent, starting pitocin  0200: Balloon in place, no change  0400: attempted to check patient unable to tolerate despite epidural; redosed  0430: balloon removed, pit paused to attempt miso if ctx space  0500: Ctx did not space, pit restarted at 1/2   0900: Patient unable to tolerate cervical check. Anes to assess epidural  0915: Plan for Pit Rest for 1 hour. Plan to place Vaginal Cytotec   1030: Anes replacing epidural   1300: Arias Balloon Placed and Pitocin Re-started as patient contract to frequently for Cytotec     Plan:  Active labor management:  Arias in place. Fibrous tissue remains in place  Continued Pitocin Augmentation per protocol  Recheck 2 hours or sooner if needed.    Christian Dominguez M.D.  PGY-4 OB/GYN  [facility]

## 2020-11-18 NOTE — PROGRESS NOTES
LABOR NOTE    Lauren K Damico is a 35 y.o. 35 y.o.  at 39w1d GA who presented for IOL.    S: Complaints: None. Epidural not placed.     O: BP (!) 101/55   Pulse 76   Temp 98.4 °F (36.9 °C) (Temporal)   Resp 18   Ht 5' (1.524 m)   Wt 93.6 kg (206 lb 7.4 oz)   LMP 2019 (Approximate)   SpO2 95%   Breastfeeding No   BMI 40.32 kg/m²     FHT: Baseline rate - 120 BPM. Moderate BTBV. No decelerations.   Category 1 tracing.  CTX: Every 2-5 minutes  SVE: ft/50/-3, membranes intact, balloon in place but not felt to be through internal os    A:   35 y.o.  at 39w1d, induction of labor with reassuring fetal heart tracing.     Active Hospital Problems    Diagnosis  POA    37 weeks gestation of pregnancy [Z3A.37]  Not Applicable    H/O bariatric surgery [Z98.84]  Not Applicable    Iron deficiency anemia [D50.9]  Yes      Resolved Hospital Problems   No resolved problems to display.     P:    TIMELINE:  Closed on admit, ctx too frequent for miso, balloon attempt unsuccessful  2200: balloon placed, ctx still too frequent, starting pitocin  0200: Balloon in place, no change    Plan:  Active labor management:  Pitocin augmentation per protocol  Balloon not removed but internal os stenotic and likely balloon not through fully. Patient to get epidural and will reassess.   Continue close maternal and fetal monitoring  Recheck 2 hours or sooner if needed.    Aniket López M.D.  Obstetrics & Gynecology  PGY4

## 2020-11-18 NOTE — PROGRESS NOTES
LABOR NOTE    Lauren K Damico is a 35 y.o. 35 y.o.  at 39w1d GA who presented for IOL.    S: Complaints: None. Epidural not placed.     O: BP (!) 102/55   Pulse 74   Temp 98.4 °F (36.9 °C) (Temporal)   Resp 18   Ht 5' (1.524 m)   Wt 93.6 kg (206 lb 7.4 oz)   LMP 2019 (Approximate)   SpO2 97%   Breastfeeding No   BMI 40.32 kg/m²     FHT: Baseline rate - 120 BPM. Moderate BTBV. No decelerations.   Category 1 tracing.  CTX: Every 2-5 minutes  SVE: ft/50/-3, membranes intact.    A:   35 y.o.  at 39w1d, induction of labor with reassuring fetal heart tracing.     Active Hospital Problems    Diagnosis  POA    37 weeks gestation of pregnancy [Z3A.37]  Not Applicable    H/O bariatric surgery [Z98.84]  Not Applicable    Iron deficiency anemia [D50.9]  Yes      Resolved Hospital Problems   No resolved problems to display.     P:    TIMELINE:  Closed on admit, ctx too frequent for miso, balloon attempt unsuccessful  2200: balloon placed, ctx still too frequent, starting pitocin    Plan:  Active labor management:  Pitocin augmentation per protocol  Continue close maternal and fetal monitoring  Recheck 2 hours or sooner if needed.    Aniket López M.D.  Obstetrics & Gynecology  PGY4

## 2020-11-18 NOTE — H&P
History & Physical                                                                   Obstetrics    Subjective:      HPI:  Lauren K Damico is a 35 y.o.  at 39w1d who is admitted to Labor & Delivery for IOL.     This pregnancy is complicated by history of gastric sleeve, nausea and vomiting in 2nd and 3rd trimester, iron deficiency anemia requiring blood and iron transfusions this pregnancy, and h/o CIN2.     Currently patient has no complaints but for feeling anxious about the position of the baby as she was told in clinic she might be breech. She reports active fetal movement, denies contractions, vaginal bleeding, and leakage of fluid from the vagina.     OB History    Para Term  AB Living   1 0 0 0 0 0   SAB TAB Ectopic Multiple Live Births   0 0 0 0 0      # Outcome Date GA Lbr Cortez/2nd Weight Sex Delivery Anes PTL Lv   1 Current              Past Medical History:   Diagnosis Date    Abnormal Pap smear 12    colpo/LEEP    Anemia     Anxiety     PCOS (polycystic ovarian syndrome)       Past Surgical History:   Procedure Laterality Date    CERVICAL BIOPSY  W/ LOOP ELECTRODE EXCISION  10-17-12    GALLBLADDER SURGERY      GASTRIC RESTRICTION SURGERY      gastric sleeve in 2018    WISDOM TOOTH EXTRACTION         Allergies: Review of patient's allergies indicates:  No Known Allergies    Medications Prior to Admission   Medication Sig Dispense Refill Last Dose    esomeprazole (NEXIUM) 20 MG capsule Take 20 mg by mouth 2 (two) times daily.  0 Unknown at Unknown time    iron,carb/vit C/vit B12/folic (IRON 100 PLUS ORAL)    Unknown at Unknown time    ondansetron (ZOFRAN) 4 MG tablet Take 1 tablet (4 mg total) by mouth 2 (two) times daily. 60 tablet 1 Unknown at Unknown time    ondansetron (ZOFRAN) 4 MG tablet Take 1 tablet (4 mg total) by mouth daily as needed for Nausea. 30 tablet 1 Unknown at Unknown time    PNV/iron/omega3/folic acid/min (PRENAT  MV-IRON-CA-FF-SE2-RAIPN ORAL)    Unknown at Unknown time    progesterone (PROMETRIUM) 200 MG capsule Take 1 capsule (200 mg total) by mouth every evening. 30 capsule 11 Unknown at Unknown time    promethazine (PHENERGAN) 12.5 MG Tab Take 1 tablet (12.5 mg total) by mouth every 6 (six) hours as needed. 30 tablet 0 Unknown at Unknown time     Social History     Socioeconomic History    Marital status:      Spouse name: Not on file    Number of children: Not on file    Years of education: Not on file    Highest education level: Not on file   Occupational History    Not on file   Social Needs    Financial resource strain: Not hard at all    Food insecurity     Worry: Never true     Inability: Never true    Transportation needs     Medical: No     Non-medical: No   Tobacco Use    Smoking status: Never Smoker    Smokeless tobacco: Never Used   Substance and Sexual Activity    Alcohol use: Yes     Alcohol/week: 1.0 standard drinks     Types: 1 Glasses of wine per week     Frequency: Never     Drinks per session: 1 or 2     Binge frequency: Never    Drug use: No    Sexual activity: Yes     Partners: Male     Birth control/protection: None   Lifestyle    Physical activity     Days per week: 0 days     Minutes per session: 0 min    Stress: Only a little   Relationships    Social connections     Talks on phone: More than three times a week     Gets together: Three times a week     Attends Protestant service: Not on file     Active member of club or organization: No     Attends meetings of clubs or organizations: Never     Relationship status:    Other Topics Concern    Not on file   Social History Narrative    Not on file     Family History   Problem Relation Age of Onset    Ovarian cancer Paternal Grandmother     Breast cancer Paternal Aunt     Ovarian cancer Paternal Aunt     Breast cancer Paternal Aunt     Uterine cancer Paternal Aunt     Colon cancer Neg Hx      Review of Systems    Constitutional: Negative for chills and fever.   Eyes: Negative for visual disturbance.   Respiratory: Negative for shortness of breath.    Cardiovascular: Negative for chest pain and palpitations.   Gastrointestinal: Negative for abdominal pain, constipation, diarrhea, nausea and vomiting.   Genitourinary: Negative for dysuria, pelvic pain, vaginal bleeding and vaginal discharge.   Musculoskeletal: Negative for back pain.   Integumentary:  Negative for rash.   Neurological: Negative for seizures, syncope and headaches.   Hematological: Does not bruise/bleed easily.   Psychiatric/Behavioral: Negative for depression. The patient is not nervous/anxious.      Objective:      Resp:  [20]      General:   Awake, alert, in no acute distress. Appears stated age.    Lungs:   Quiet, unlabored breathing. No signs of respiratory distress. No wheezing.    Heart:   Regular rate, no peripheral cyanosis, peripheral pulses present and symmetric bilaterally.    Abdomen:  Gravid, size consistent with gestational age. Not distended. Soft, non-tender.    Extremities No swelling in hands or upper extremities. No lower extremity edema.    FHT: Baseline 140 BPM, accelerations present, decelerations absent, moderate BTBV (Category 1)                 TOCO: Every 2-3 minutes   Presentations: Vertex presentation confirmed with bedside US   Cervix: Closed/thick, soft, anterior, ballon would not pass through internal os on attempt     Lab Review:  Blood Type: O POS  GBBS: negative   Rubella: Immune  RPR: Non-reactive  HIV: Negative  Hepatitis B: Negative     Assessment:     35 y.o.  at 39w1d with PMH significant for gastric sleeve and pregnancy complicated by anemia and n/v admitted for IOL.     Plan:     1. Induction of Labor  - Admit to Labor and Delivery unit  - On-call OB and anesthesia staff notified  - Risks, benefits, alternatives and possible complications of vaginal, operative vaginal, and  delivery, as well as  administration of blood products have been discussed in detail with the patient. Consents signed and in chart.   - Draw CBC and T&S  - Insert peripheral IV  - Begin induction with misoprostol if contractions space, otherwise will attempt ballon placement (unsuccessful on initial exam)  - Epidural per Anesthesia when requested by patient   - Recheck in 2 hrs or PRN    2. Anemia  - Followed by heme/onc, has had Fe transfusions and 2 u pRBC this pregnancy  - H/H 11/34 currently    3. History of gastric sleeve    4. H/o N/V in 2nd and third trimester  - Patient attributes to gastric sleeve - managed with zofran    Post-Partum Hemorrhage risk - low    Aniket López M.D. PGY-3  Obstetrics & Gynecology

## 2020-11-18 NOTE — CARE UPDATE
OB Service Care Update    Resident physician to bedside for placement of vaginal Cytotec and possible balloon placement. Anesthesia currently replacing epidural after it was determined that first epidural was not working properly. Will reassess patient for placement of vaginal Cyotec once adequate anesthesia is achieved.     Christian Dominguez M.D.  PGY-4 OB/GYN  [facility]

## 2020-11-18 NOTE — ANESTHESIA PROCEDURE NOTES
Epidural    Patient location during procedure: OB   Reason for block: primary anesthetic   Diagnosis: IUP   Start time: 11/18/2020 9:57 AM  Timeout: 11/18/2020 9:57 AM  End time: 11/18/2020 10:02 AM    Staffing  Performing Provider: Chelo Rogers MD  Authorizing Provider: Chelo Rogers MD        Preanesthetic Checklist  Completed: patient identified, surgical consent, pre-op evaluation, timeout performed, IV checked, risks and benefits discussed, monitors and equipment checked, anesthesia consent given, hand hygiene performed and patient being monitored  Preparation  Patient position: sitting  Prep: ChloraPrep  Patient monitoring: Pulse Ox and Blood Pressure  Epidural  Skin Anesthetic: lidocaine 1%  Skin Wheal: 3 mL  Administration type: continuous  Approach: midline  Interspace: L2-3    Injection technique: HERMANN air  Needle and Epidural Catheter  Needle type: Tuohy   Needle gauge: 17  Needle length: 3.5 inches  Needle insertion depth: 5 cm  Catheter type: springwound and multi-orifice  Catheter size: 19 G  Catheter at skin depth: 10 cm  Test dose: 3 mL of lidocaine 1.5% with Epi 1-to-200,000  Additional Documentation: incremental injection, negative aspiration for heme and CSF, no paresthesia on injection, no signs/symptoms of IV or SA injection, no significant pain on injection and no significant complaints from patient  Needle localization: anatomical landmarks  Assessment  Ease of block: easy  Patient's tolerance of the procedure: comfortable throughout block and no complaintsNo inadvertent dural puncture with Tuohy.  Dural puncture performed with spinal needle.

## 2020-11-18 NOTE — CARE UPDATE
Entry for Alejandra Sahu CNM    Patient examined by Luis Alberto HUGHES. Patient did not tolerate check and cervical dilation unable to assess. Reassuring fetal status noted per monitor. Will contact anesthesia to reassess epidural.     Christian Dominguez M.D.  PGY-4 OB/GYN  [facility]

## 2020-11-18 NOTE — ANESTHESIA PROCEDURE NOTES
Epidural    Patient location during procedure: OB   Reason for block: primary anesthetic   Diagnosis: IUP   Start time: 11/18/2020 2:42 AM  Timeout: 11/18/2020 2:40 AM  End time: 11/18/2020 2:45 AM  Surgery related to: Vaginal Delivery    Staffing  Performing Provider: Yvette Marsh MD  Authorizing Provider: Yvette Marsh MD        Preanesthetic Checklist  Completed: patient identified, site marked, surgical consent, pre-op evaluation, timeout performed, IV checked, risks and benefits discussed, monitors and equipment checked, anesthesia consent given, hand hygiene performed and patient being monitored  Preparation  Patient position: sitting  Prep: ChloraPrep  Patient monitoring: Pulse Ox and Blood Pressure  Epidural  Skin Anesthetic: lidocaine 1%  Skin Wheal: 3 mL  Administration type: continuous  Approach: midline  Interspace: L3-4    Injection technique: HERMANN saline  Needle and Epidural Catheter  Needle type: Tuohy   Needle gauge: 17  Needle length: 3.5 inches  Needle insertion depth: 8 cm  Catheter type: springwound  Catheter size: 19 G  Catheter at skin depth: 12 cm  Test dose: 3 mL of lidocaine 1.5% with Epi 1-to-200,000  Additional Documentation: incremental injection, negative aspiration for heme and CSF, no paresthesia on injection, no signs/symptoms of IV or SA injection, no significant pain on injection and no significant complaints from patient  Needle localization: anatomical landmarks  Medications:  Volume per aspiration: 5 mL  Time between aspirations: 5 minutes  Assessment  Ease of block: easy  Patient's tolerance of the procedure: comfortable throughout block and no complaintsNo inadvertent dural puncture with Tuohy.  Dural puncture performed with spinal needle.

## 2020-11-18 NOTE — PROGRESS NOTES
TXA .561 Consent note:    Ms. Damico was admitted last night 2020 for Induction of labor.  She was screened for eligibility in the Txa study and met inclusion/exclusion criteria.  She was approached in private L&D suite and we discussed the study in detail, including the purpose, numbers/length, procedures, risk/benefit, cost/payment, contacts (investigators/IRB), alternatives/voluntary nature/withdrawal and confidentiality/HIPPA.  Dr. Barnett was available for questions, but patient had no further questions and voluntarily consented to participate in the Txa study.  Patient also did consent to be contacted for future studies. The consent form was signed on today 2020 at 1500 and patient was given a copy of signed consent. No study related procedures were conducted prior to this written consent and patient will be randomized if a  is called.

## 2020-11-18 NOTE — ANESTHESIA PREPROCEDURE EVALUATION
[facility]  Anesthesia Pre-Operative Evaluation         Patient Name: Lauren K Damico  YOB: 1985  MRN: [id number]    2020      Lauren K Damico is a 35 y.o. female  @ 39w1d who presents for IOL. This pregnancy is complicated by history of gastric sleeve, nausea and vomiting in 2nd and 3rd trimester, iron deficiency anemia requiring blood and iron transfusions this pregnancy, and h/o CIN2.    OB History    Para Term  AB Living   1             SAB TAB Ectopic Multiple Live Births                  # Outcome Date GA Lbr Cortez/2nd Weight Sex Delivery Anes PTL Lv   1 Current                Review of patient's allergies indicates:  No Known Allergies    Wt Readings from Last 1 Encounters:   20 1846 93.6 kg (206 lb 7.4 oz)       BP Readings from Last 3 Encounters:   20 116/65   20 116/76   20 106/66       Patient Active Problem List   Diagnosis    Pneumonia    History of abnormal Pap smear    Moderate dysplasia of cervix (ROSY II)    Iron deficiency anemia    37 weeks gestation of pregnancy    H/O bariatric surgery       Past Surgical History:   Procedure Laterality Date    CERVICAL BIOPSY  W/ LOOP ELECTRODE EXCISION  10-17-12    GALLBLADDER SURGERY      GASTRIC RESTRICTION SURGERY      gastric sleeve in 2018    WISDOM TOOTH EXTRACTION         Social History     Socioeconomic History    Marital status:      Spouse name: Not on file    Number of children: Not on file    Years of education: Not on file    Highest education level: Not on file   Occupational History    Not on file   Social Needs    Financial resource strain: Not hard at all    Food insecurity     Worry: Never true     Inability: Never true    Transportation needs     Medical: No     Non-medical: No   Tobacco Use    Smoking status: Never Smoker    Smokeless tobacco: Never Used   Substance and Sexual Activity    Alcohol use: Yes     Alcohol/week: 1.0 standard  drinks     Types: 1 Glasses of wine per week     Frequency: Never     Drinks per session: 1 or 2     Binge frequency: Never    Drug use: No    Sexual activity: Yes     Partners: Male     Birth control/protection: None   Lifestyle    Physical activity     Days per week: 0 days     Minutes per session: 0 min    Stress: Only a little   Relationships    Social connections     Talks on phone: More than three times a week     Gets together: Three times a week     Attends Sikhism service: Not on file     Active member of club or organization: No     Attends meetings of clubs or organizations: Never     Relationship status:    Other Topics Concern    Not on file   Social History Narrative    Not on file         Chemistry        Component Value Date/Time     10/29/2020 1500    K 3.7 10/29/2020 1500     10/29/2020 1500    CO2 17 (L) 10/29/2020 1500    BUN 5 (L) 10/29/2020 1500    CREATININE 0.5 10/29/2020 1500    GLU 82 10/29/2020 1500        Component Value Date/Time    CALCIUM 8.4 (L) 10/29/2020 1500    ALKPHOS 149 (H) 10/29/2020 1500    AST 12 10/29/2020 1500    ALT 10 10/29/2020 1500    BILITOT 0.6 10/29/2020 1500    ESTGFRAFRICA >60 10/29/2020 1500    EGFRNONAA >60 10/29/2020 1500            Lab Results   Component Value Date    WBC 7.82 11/17/2020    HGB 11.3 (L) 11/17/2020    HCT 34.5 (L) 11/17/2020    MCV 89 11/17/2020     11/17/2020       No results for input(s): PT, INR, PROTIME, APTT in the last 72 hours.          Anesthesia Evaluation    I have reviewed the Patient Summary Reports.         Review of Systems  Anesthesia Hx:  No problems with previous Anesthesia  History of prior surgery of interest to airway management or planning:  Denies Personal Hx of Anesthesia complications.   Social:  Non-Smoker    Hematology/Oncology:         -- Anemia:   Cardiovascular:  Cardiovascular Normal     Pulmonary:  Pulmonary Normal    Musculoskeletal:  Musculoskeletal Normal     Neurological:  Neurology Normal    Endocrine:   PCOS       Physical Exam  General:  Well nourished, Obesity    Airway/Jaw/Neck:  Airway Findings: Mouth Opening: Normal Tongue: Normal  General Airway Assessment: Adult  TM Distance: Normal, at least 6 cm  Jaw/Neck Findings:  Neck ROM: Normal ROM      Dental:  Dental Findings: In tact    Heart/Vascular:  Heart Findings: Rate: Normal  Rhythm: Regular Rhythm        Mental Status:  Mental Status Findings:  Cooperative, Alert and Oriented         Anesthesia Plan  Type of Anesthesia, risks & benefits discussed:  Anesthesia Type:  epidural, general, CSE, spinal  Patient's Preference:   Intra-op Monitoring Plan:   Intra-op Monitoring Plan Comments:   Post Op Pain Control Plan:   Post Op Pain Control Plan Comments:   Induction:    Beta Blocker:         Informed Consent: Patient understands risks and agrees with Anesthesia plan.  Questions answered. Anesthesia consent signed with patient.  ASA Score: 3     Day of Surgery Review of History & Physical:    H&P update referred to the surgeon.         Ready For Surgery From Anesthesia Perspective.

## 2020-11-19 ENCOUNTER — PATIENT OUTREACH (OUTPATIENT)
Dept: ADMINISTRATIVE | Facility: OTHER | Age: 35
End: 2020-11-19

## 2020-11-19 ENCOUNTER — RESEARCH ENCOUNTER (OUTPATIENT)
Dept: RESEARCH | Facility: HOSPITAL | Age: 35
End: 2020-11-19

## 2020-11-19 PROBLEM — Z3A.37 37 WEEKS GESTATION OF PREGNANCY: Status: ACTIVE | Noted: 2020-11-19

## 2020-11-19 PROBLEM — Z3A.37 37 WEEKS GESTATION OF PREGNANCY: Status: RESOLVED | Noted: 2020-11-17 | Resolved: 2020-11-19

## 2020-11-19 PROBLEM — Z98.891: Status: ACTIVE | Noted: 2020-11-19

## 2020-11-19 PROCEDURE — 25000003 PHARM REV CODE 250: Performed by: STUDENT IN AN ORGANIZED HEALTH CARE EDUCATION/TRAINING PROGRAM

## 2020-11-19 PROCEDURE — 63600175 PHARM REV CODE 636 W HCPCS: Performed by: STUDENT IN AN ORGANIZED HEALTH CARE EDUCATION/TRAINING PROGRAM

## 2020-11-19 PROCEDURE — 59510 PR FULL ROUT OBSTE CARE,CESAREAN DELIV: ICD-10-PCS | Mod: GB,,, | Performed by: STUDENT IN AN ORGANIZED HEALTH CARE EDUCATION/TRAINING PROGRAM

## 2020-11-19 PROCEDURE — 59510 CESAREAN DELIVERY: CPT | Mod: GB,,, | Performed by: STUDENT IN AN ORGANIZED HEALTH CARE EDUCATION/TRAINING PROGRAM

## 2020-11-19 PROCEDURE — 25000003 PHARM REV CODE 250

## 2020-11-19 PROCEDURE — 11000001 HC ACUTE MED/SURG PRIVATE ROOM

## 2020-11-19 PROCEDURE — 63600175 PHARM REV CODE 636 W HCPCS: Performed by: ANESTHESIOLOGY

## 2020-11-19 RX ORDER — AMOXICILLIN 250 MG
1 CAPSULE ORAL NIGHTLY PRN
Status: DISCONTINUED | OUTPATIENT
Start: 2020-11-19 | End: 2020-11-21 | Stop reason: HOSPADM

## 2020-11-19 RX ORDER — METHYLERGONOVINE MALEATE 0.2 MG/ML
INJECTION INTRAVENOUS
Status: DISCONTINUED | OUTPATIENT
Start: 2020-11-19 | End: 2020-11-19

## 2020-11-19 RX ORDER — MISOPROSTOL 200 UG/1
800 TABLET ORAL ONCE
Status: DISCONTINUED | OUTPATIENT
Start: 2020-11-19 | End: 2020-11-20

## 2020-11-19 RX ORDER — SODIUM CHLORIDE, SODIUM LACTATE, POTASSIUM CHLORIDE, CALCIUM CHLORIDE 600; 310; 30; 20 MG/100ML; MG/100ML; MG/100ML; MG/100ML
INJECTION, SOLUTION INTRAVENOUS CONTINUOUS
Status: ACTIVE | OUTPATIENT
Start: 2020-11-19 | End: 2020-11-19

## 2020-11-19 RX ORDER — MISOPROSTOL 200 UG/1
800 TABLET ORAL ONCE
Status: DISCONTINUED | OUTPATIENT
Start: 2020-11-19 | End: 2020-11-19

## 2020-11-19 RX ORDER — ACETAMINOPHEN 10 MG/ML
INJECTION, SOLUTION INTRAVENOUS
Status: DISCONTINUED | OUTPATIENT
Start: 2020-11-19 | End: 2020-11-19

## 2020-11-19 RX ORDER — ACETAMINOPHEN 325 MG/1
650 TABLET ORAL EVERY 6 HOURS
Status: DISPENSED | OUTPATIENT
Start: 2020-11-19 | End: 2020-11-20

## 2020-11-19 RX ORDER — OXYCODONE AND ACETAMINOPHEN 10; 325 MG/1; MG/1
1 TABLET ORAL EVERY 4 HOURS PRN
Status: DISCONTINUED | OUTPATIENT
Start: 2020-11-20 | End: 2020-11-21 | Stop reason: HOSPADM

## 2020-11-19 RX ORDER — IBUPROFEN 600 MG/1
600 TABLET ORAL EVERY 6 HOURS
Status: DISCONTINUED | OUTPATIENT
Start: 2020-11-20 | End: 2020-11-19

## 2020-11-19 RX ORDER — OXYCODONE AND ACETAMINOPHEN 5; 325 MG/1; MG/1
1 TABLET ORAL EVERY 4 HOURS PRN
Status: DISCONTINUED | OUTPATIENT
Start: 2020-11-20 | End: 2020-11-21 | Stop reason: HOSPADM

## 2020-11-19 RX ORDER — DEXAMETHASONE SODIUM PHOSPHATE 4 MG/ML
INJECTION, SOLUTION INTRA-ARTICULAR; INTRALESIONAL; INTRAMUSCULAR; INTRAVENOUS; SOFT TISSUE
Status: DISCONTINUED | OUTPATIENT
Start: 2020-11-19 | End: 2020-11-19

## 2020-11-19 RX ORDER — OXYCODONE HYDROCHLORIDE 5 MG/1
5 TABLET ORAL EVERY 4 HOURS PRN
Status: ACTIVE | OUTPATIENT
Start: 2020-11-19 | End: 2020-11-20

## 2020-11-19 RX ORDER — SIMETHICONE 80 MG
1 TABLET,CHEWABLE ORAL EVERY 6 HOURS PRN
Status: DISCONTINUED | OUTPATIENT
Start: 2020-11-19 | End: 2020-11-21 | Stop reason: HOSPADM

## 2020-11-19 RX ORDER — PRENATAL WITH FERROUS FUM AND FOLIC ACID 3080; 920; 120; 400; 22; 1.84; 3; 20; 10; 1; 12; 200; 27; 25; 2 [IU]/1; [IU]/1; MG/1; [IU]/1; MG/1; MG/1; MG/1; MG/1; MG/1; MG/1; UG/1; MG/1; MG/1; MG/1; MG/1
1 TABLET ORAL DAILY
Status: DISCONTINUED | OUTPATIENT
Start: 2020-11-19 | End: 2020-11-21 | Stop reason: HOSPADM

## 2020-11-19 RX ORDER — MISOPROSTOL 200 UG/1
TABLET ORAL
Status: COMPLETED
Start: 2020-11-19 | End: 2020-11-19

## 2020-11-19 RX ORDER — MUPIROCIN 20 MG/G
OINTMENT TOPICAL 2 TIMES DAILY
Status: DISCONTINUED | OUTPATIENT
Start: 2020-11-19 | End: 2020-11-21 | Stop reason: HOSPADM

## 2020-11-19 RX ORDER — SODIUM CHLORIDE, SODIUM LACTATE, POTASSIUM CHLORIDE, CALCIUM CHLORIDE 600; 310; 30; 20 MG/100ML; MG/100ML; MG/100ML; MG/100ML
INJECTION, SOLUTION INTRAVENOUS CONTINUOUS PRN
Status: DISCONTINUED | OUTPATIENT
Start: 2020-11-18 | End: 2020-11-19

## 2020-11-19 RX ORDER — DOCUSATE SODIUM 100 MG/1
200 CAPSULE, LIQUID FILLED ORAL 2 TIMES DAILY
Status: DISCONTINUED | OUTPATIENT
Start: 2020-11-19 | End: 2020-11-21 | Stop reason: HOSPADM

## 2020-11-19 RX ORDER — OXYCODONE HYDROCHLORIDE 5 MG/1
10 TABLET ORAL EVERY 4 HOURS PRN
Status: DISPENSED | OUTPATIENT
Start: 2020-11-19 | End: 2020-11-20

## 2020-11-19 RX ORDER — ONDANSETRON 2 MG/ML
INJECTION INTRAMUSCULAR; INTRAVENOUS
Status: DISCONTINUED | OUTPATIENT
Start: 2020-11-19 | End: 2020-11-19

## 2020-11-19 RX ORDER — ADHESIVE BANDAGE
30 BANDAGE TOPICAL 2 TIMES DAILY PRN
Status: DISCONTINUED | OUTPATIENT
Start: 2020-11-20 | End: 2020-11-21 | Stop reason: HOSPADM

## 2020-11-19 RX ORDER — ONDANSETRON 2 MG/ML
4 INJECTION INTRAMUSCULAR; INTRAVENOUS EVERY 6 HOURS PRN
Status: ACTIVE | OUTPATIENT
Start: 2020-11-19 | End: 2020-11-20

## 2020-11-19 RX ORDER — LIDOCAINE HCL/EPINEPHRINE/PF 2%-1:200K
VIAL (ML) INJECTION
Status: DISCONTINUED | OUTPATIENT
Start: 2020-11-18 | End: 2020-11-19

## 2020-11-19 RX ORDER — DIPHENHYDRAMINE HCL 25 MG
25 CAPSULE ORAL EVERY 4 HOURS PRN
Status: DISCONTINUED | OUTPATIENT
Start: 2020-11-19 | End: 2020-11-21 | Stop reason: HOSPADM

## 2020-11-19 RX ORDER — HYDROCORTISONE 25 MG/G
CREAM TOPICAL 3 TIMES DAILY PRN
Status: DISCONTINUED | OUTPATIENT
Start: 2020-11-19 | End: 2020-11-21 | Stop reason: HOSPADM

## 2020-11-19 RX ORDER — PHENYLEPHRINE HYDROCHLORIDE 10 MG/ML
INJECTION INTRAVENOUS
Status: DISCONTINUED | OUTPATIENT
Start: 2020-11-19 | End: 2020-11-19

## 2020-11-19 RX ORDER — BISACODYL 10 MG
10 SUPPOSITORY, RECTAL RECTAL ONCE AS NEEDED
Status: DISCONTINUED | OUTPATIENT
Start: 2020-11-19 | End: 2020-11-21 | Stop reason: HOSPADM

## 2020-11-19 RX ORDER — KETOROLAC TROMETHAMINE 30 MG/ML
INJECTION, SOLUTION INTRAMUSCULAR; INTRAVENOUS
Status: DISCONTINUED | OUTPATIENT
Start: 2020-11-19 | End: 2020-11-19

## 2020-11-19 RX ORDER — OXYCODONE AND ACETAMINOPHEN 10; 325 MG/1; MG/1
1 TABLET ORAL EVERY 4 HOURS PRN
Status: DISCONTINUED | OUTPATIENT
Start: 2020-11-20 | End: 2020-11-19

## 2020-11-19 RX ORDER — OXYTOCIN/RINGER'S LACTATE 30/500 ML
95 PLASTIC BAG, INJECTION (ML) INTRAVENOUS ONCE
Status: COMPLETED | OUTPATIENT
Start: 2020-11-19 | End: 2020-11-19

## 2020-11-19 RX ORDER — KETOROLAC TROMETHAMINE 30 MG/ML
30 INJECTION, SOLUTION INTRAMUSCULAR; INTRAVENOUS
Status: COMPLETED | OUTPATIENT
Start: 2020-11-19 | End: 2020-11-20

## 2020-11-19 RX ORDER — OXYCODONE AND ACETAMINOPHEN 5; 325 MG/1; MG/1
1 TABLET ORAL EVERY 4 HOURS PRN
Status: DISCONTINUED | OUTPATIENT
Start: 2020-11-19 | End: 2020-11-19

## 2020-11-19 RX ORDER — ONDANSETRON 8 MG/1
8 TABLET, ORALLY DISINTEGRATING ORAL EVERY 8 HOURS PRN
Status: DISCONTINUED | OUTPATIENT
Start: 2020-11-19 | End: 2020-11-21 | Stop reason: HOSPADM

## 2020-11-19 RX ADMIN — DOCUSATE SODIUM 200 MG: 100 CAPSULE, LIQUID FILLED ORAL at 09:11

## 2020-11-19 RX ADMIN — Medication 6 UNITS: at 12:11

## 2020-11-19 RX ADMIN — PHENYLEPHRINE HYDROCHLORIDE 100 MCG: 10 INJECTION INTRAVENOUS at 12:11

## 2020-11-19 RX ADMIN — KETOROLAC TROMETHAMINE 30 MG: 30 INJECTION, SOLUTION INTRAMUSCULAR at 12:11

## 2020-11-19 RX ADMIN — MUPIROCIN: 20 OINTMENT TOPICAL at 09:11

## 2020-11-19 RX ADMIN — KETOROLAC TROMETHAMINE 30 MG: 30 INJECTION, SOLUTION INTRAMUSCULAR at 05:11

## 2020-11-19 RX ADMIN — DEXTROSE 2 G: 50 INJECTION, SOLUTION INTRAVENOUS at 12:11

## 2020-11-19 RX ADMIN — Medication 95 MILLI-UNITS/MIN: at 03:11

## 2020-11-19 RX ADMIN — PHENYLEPHRINE HYDROCHLORIDE 200 MCG: 10 INJECTION INTRAVENOUS at 12:11

## 2020-11-19 RX ADMIN — ACETAMINOPHEN 650 MG: 325 TABLET, FILM COATED ORAL at 05:11

## 2020-11-19 RX ADMIN — METHYLERGONOVINE MALEATE 200 MCG: 0.2 INJECTION, SOLUTION INTRAMUSCULAR; INTRAVENOUS at 12:11

## 2020-11-19 RX ADMIN — OXYCODONE HYDROCHLORIDE 10 MG: 5 TABLET ORAL at 02:11

## 2020-11-19 RX ADMIN — DEXAMETHASONE SODIUM PHOSPHATE 4 MG: 4 INJECTION, SOLUTION INTRA-ARTICULAR; INTRALESIONAL; INTRAMUSCULAR; INTRAVENOUS; SOFT TISSUE at 12:11

## 2020-11-19 RX ADMIN — ACETAMINOPHEN 1000 MG: 10 INJECTION, SOLUTION INTRAVENOUS at 12:11

## 2020-11-19 RX ADMIN — PRENATAL VIT W/ FE FUMARATE-FA TAB 27-0.8 MG 1 TABLET: 27-0.8 TAB at 09:11

## 2020-11-19 RX ADMIN — MISOPROSTOL 800 MCG: 200 TABLET ORAL at 01:11

## 2020-11-19 RX ADMIN — KETOROLAC TROMETHAMINE 30 MG: 30 INJECTION, SOLUTION INTRAMUSCULAR; INTRAVENOUS at 01:11

## 2020-11-19 RX ADMIN — KETOROLAC TROMETHAMINE 30 MG: 30 INJECTION, SOLUTION INTRAMUSCULAR at 06:11

## 2020-11-19 RX ADMIN — ACETAMINOPHEN 650 MG: 325 TABLET, FILM COATED ORAL at 12:11

## 2020-11-19 RX ADMIN — ONDANSETRON 4 MG: 2 INJECTION INTRAMUSCULAR; INTRAVENOUS at 12:11

## 2020-11-19 RX ADMIN — SIMETHICONE 80 MG: 80 TABLET, CHEWABLE ORAL at 07:11

## 2020-11-19 RX ADMIN — OXYCODONE HYDROCHLORIDE 10 MG: 5 TABLET ORAL at 07:11

## 2020-11-19 RX ADMIN — ACETAMINOPHEN 650 MG: 325 TABLET, FILM COATED ORAL at 06:11

## 2020-11-19 RX ADMIN — AZITHROMYCIN MONOHYDRATE 500 MG: 500 INJECTION, POWDER, LYOPHILIZED, FOR SOLUTION INTRAVENOUS at 12:11

## 2020-11-19 NOTE — PROGRESS NOTES
TXA 2017.561 Randomization note:    Decision was made for  delivery 2020 @ 4715 and patient was taken to OB OR per OB staff at 0254.  Infant was delivered with cord clamped at 00:33 and study drug code #[id number] Tranexamic acid 1 gr/placebo was started after cord clamp at 00:33.  All study drug was given without incidence and plan to follow up with patient in 24 hours to assess for any complaints.

## 2020-11-19 NOTE — PROGRESS NOTES
LABOR NOTE    Lauren K Damico is a 35 y.o. 35 y.o.  at 39w1d GA who presented for IOL.    S: Complaints: None. Epidural working well.     O: /63   Pulse 90   Temp 97.9 °F (36.6 °C) (Oral)   Resp 16   Ht 5' (1.524 m)   Wt 93.6 kg (206 lb 7.4 oz)   LMP 2019 (Approximate)   SpO2 98%   Breastfeeding No   BMI 40.32 kg/m²     FHT: Baseline rate - 135 BPM. Moderate BTBV. No decelerations.   Category 1 tracing.  CTX: Every 2 minutes  SVE: 2/80/-3, membranes intact, balloon in place, fibrous tissue remains    A:   35 y.o.  at 39w1d, induction of labor with reassuring fetal heart tracing.     Active Hospital Problems    Diagnosis  POA    37 weeks gestation of pregnancy [Z3A.37]  Not Applicable    H/O bariatric surgery [Z98.84]  Not Applicable    Iron deficiency anemia [D50.9]  Yes      Resolved Hospital Problems   No resolved problems to display.     P:  TIMELINE:  Closed on admit, ctx too frequent for miso, balloon attempt unsuccessful  2200: balloon placed, ctx still too frequent, starting pitocin  0200: Balloon in place, no change  0400: attempted to check patient unable to tolerate despite epidural; redosed  0430: balloon removed, pit paused to attempt miso if ctx space  0500: Ctx did not space, pit restarted at 1/2   0900: Patient unable to tolerate cervical check. Anes to assess epidural  0915: Plan for Pit Rest for 1 hour. Plan to place Vaginal Cytotec   1030: Anes replacing epidural   1330: Preciado Balloon Placed and Pitocin Re-started as patient contract to frequently for Cytotec   1700:  2/80/-3, Preciado Remains in Place. Pitocin infusion continued  2300: 2/80/-3; unable to move preciado 2/2 fibrous tissue still in place.    Patient is frustrated by her lack of progress. States she discussed with Dr. Dominguez earlier today about possible c/s and was counseled appropriately that there is currently no medical indication to warrant a c/s. Patient continues to be discouraged and is leaning towards  desiring a c/s. I explained to patient again that there is no medical indication for a c/s as fetal heart tones remain reassuring and patient is not yet considered a failed induction. Patient would like time to talk with her  about their ultimate decision. Understands if decision is made to proceed with c/s it would be considered an elective primary section.       Jessica Guerra M.D.  [facility] PGY-2

## 2020-11-19 NOTE — L&D DELIVERY NOTE
[facility]   Section   Operative Note    SUMMARY      Section Procedure Note    Indications: Ms. Lauren Damico is a 35 [id number] female with IUP 39w3d weeks' gestational age. Patient admitted for IOL. After attempted induction of labor for 24 hours with minimal progress, patient desired elective pLTCS.     Pre-operative Diagnosis:   1. IUP @ 39.3 weeks  2. Morbid Obesity  3. Hx of bariatric surgery  4. Hx of LEEP    Post-operative Diagnosis: same    Procedure: pLTCS    Surgeon: Karo Zayas MD    Assistants: Jessica Guerra MD - PGY2 & Aniket López MD - PGY4    Anesthesia: Epidural anesthesia    Findings:   - Normal appearing tubes and ovaries  - A female infant weighing 3590g with APGARs of 8,9  - Normal appearing placenta > discarded  - Postpartum hemorrhage - EBL 1200cc    Estimated Blood Loss:  1200 cc           Complications:  None; patient tolerated the procedure well.           Disposition: Recovery           Condition: stable    Procedure Details   Patient admitted for IOL. After being in labor for 24 hours, patient desired elective pLTCS.The risks, benefits, complications, treatment options, and expected outcomes were discussed with the patient.  The patient concurred with the proposed plan, giving informed consent.    The patient was taken to Operating Room, identified as Lauren K Damico, birthdate and  procedure were verified.  A Time Out was held and the above information confirmed.    After anesthesia epidural was found to be adequate, the patient was prepped and draped in the usual sterile fashion in the dorsal supine position with a leftward tilt.    A transverse incision was made in the skin and carried  through the underlying subcutaneous tissue to the level of the  fascia. The fascia was scored at the midline with the inside knife.  The fascial incision was then extended transversely using the curved garcia scissors. The superior aspect of the fascia was  grasped with [facility] clamps x 2 and  from the underlying rectus tissue superiorly and with the help of the curved garcia scissors. Attention was then turned to the inferior aspect of the fascial incision which was grasped and  from the underlying rectus muscle in a similar fashion.  The peritoneum was identified, found to be free of adherent bowel and entered bluntly.   The vesico-uterine peritoneum was then identified and bladder blade was inserted. The vesico-uterine peritoneum was grasped, tented away from the underlying uterus and entered sharply with the Metzenbaum scissors.  The incision was extended  transversely and the bladder flap was bluntly freed from the lower uterine segment. The bladder blade was reinserted to keep the bladder out of the operative field.   A transverse uterine incision was made with knife. The amniotic sac was then entered bluntly and fluid noted to be Clear.   Infant was noted to be in LOT position.  The patient delivered a single viable female infant weighing 3590 grams with Apgar scores of 8/9 at one and five minutes respectively was delivered and handed off to the waiting nursing staff.  The umbilical cord was clamped and cut cord blood was obtained for evaluation.   The placenta was removed intact, appeared normal, and was discarded. The uterus was exteriorized. The uterus, tubes and ovaries were examined and appeared normal. The uterine incision was closed with running locked sutures of #1 Chromic. An second running imbricating layer was then placed ensuring excellent hemostasis. The uterus was returned to the abdominal cavity. Incision was reinspected and good hemostasis was noted. The paracolic gutters were irrigated with clean moist laps x 2. The fascia was then reapproximated with running sutures of #1 PDS. The subcutaneous tissue was > 2 cm. Closed in running fashion with 2-0 Vicryl. The skin was reapproximated with 4-0 Monocryl.    Sponge, lap and needle  counts were correct x 2 prior the abdominal closure and at the conclusion of the case.       NOTE: THIS PATIENT IS A CANDIDATE FOR A TRIAL OF LABOR AFTER     Jessica Guerra M.D.  OBGYN PGY2    I was present and scrubbed for the entire procedure and agree with the resident's documentation.     Karo Zayas MD  Obstetrics & Gynecology   [facility]         Delivery Information for Girl Lauren Damico    Birth information:  YOB: 2018   Time of birth: 12:32 AM   Sex: female   Head Delivery Date/Time: 2020 12:32 AM   Delivery type: , Low Transverse   Gestational Age: 39w3d    Delivery Providers    Delivering clinician: Karo Zayas MD   Provider Role    Aniket López MD Resident    Jessica Guerra MD Resident    Olvin Sharp MD Anesthesiologist    Yvette Marsh MD Anesthesiologist    Cris Chavez, JOEL Circulator    Brooklynn Diaz, RN Registered Nurse    Liliam Freed, RN Registered Nurse    Jes Childs, [facility]    Nic Reeves, JOEL Charge Nurse            Measurements    Weight: 3590 g  Length: 48.9 cm  Head circumference: 33.7 cm  Chest circumference: 34.9 cm         Apgars    Living status: Living  Apgars:  1 min.:  5 min.:  10 min.:  15 min.:  20 min.:    Skin color:  0  1       Heart rate:  2  2       Reflex irritability:  2  2       Muscle tone:  2  2       Respiratory effort:  2  2       Total:  8  9       Apgars assigned by: JOEL DIAZ         Operative Delivery    Forceps attempted?: No  Vacuum extractor attempted?: No         Shoulder Dystocia    Shoulder dystocia present?: No           Presentation    Presentation: Vertex  Position: Occiput Anterior           Interventions/Resuscitation    Method: Bulb Suctioning, Tactile Stimulation       Cord    Vessels: 3 vessels  Complications: Nuchal  Nuchal Intervention: reduced  Nuchal Cord Description: loose nuchal cord  Number of Loops: 1  Delayed Cord Clamping?: No  Cord  Clamped Date/Time: 2020 12:32 AM  Cord Blood Disposition: Sent with Baby  Gases Sent?: No  Stem Cell Collection (by MD): No       Placenta    Placenta delivery date/time: 2020 0034  Placenta removal: Manual removal  Placenta appearance: Intact  Placenta disposition: family           Labor Events:       labor: No     Labor Onset Date/Time:         Dilation Complete Date/Time:         Start Pushing Date/Time:         Start Pushing Date/Time:       Rupture Date/Time: 20  06         Rupture type:          Fluid Amount:       Fluid Color:       Fluid Odor:       Membrane Status: INT (Intact)               steroids: None     Antibiotics given for GBS: No     Induction: balloon dilation (Arias);oxytocin     Indications for induction:  Elective     Augmentation: oxytocin     Indications for augmentation: Fetal Heart Rate or Rhythm Abnormality     Labor complications: None     Additional complications:          Cervical ripening:                     Delivery:      Episiotomy: None     Indication for Episiotomy:       Perineal Lacerations: None Repaired:      Periurethral Laceration:   Repaired:     Labial Laceration:   Repaired:     Sulcus Laceration:   Repaired:     Vaginal Laceration:   Repaired:     Cervical Laceration:   Repaired:     Repair suture: None     Repair # of packets:       Last Value - EBL - Nursing (mL):       Sum - EBL - Nursing (mL): 0     Last Value - EBL - Anesthesia (mL):      Calculated QBL (mL): 1270      Vaginal Sweep Performed: No     Surgicount Correct: Yes       Other providers:       Anesthesia    Method: Epidural          Details (if applicable):  Trial of Labor Yes    Categorization: Primary    Priority: Routine   Indications for : Failure to Progress;Other (Add Comments)   Incision Type:       Additional  information:  Forceps:    Vacuum:    Breech:    Observed anomalies    Other (Comments):

## 2020-11-19 NOTE — PLAN OF CARE
Problem: Adult Inpatient Plan of Care  Goal: Plan of Care Review  Outcome: Ongoing, Progressing  Goal: Patient-Specific Goal (Individualization)  Outcome: Ongoing, Progressing  Goal: Absence of Hospital-Acquired Illness or Injury  Outcome: Ongoing, Progressing  Goal: Optimal Comfort and Wellbeing  Outcome: Ongoing, Progressing  Goal: Readiness for Transition of Care  Outcome: Ongoing, Progressing  Goal: Rounds/Family Conference  Outcome: Ongoing, Progressing     Problem:  Fall Injury Risk  Goal: Absence of Fall, Infant Drop and Related Injury  Outcome: Ongoing, Progressing     Problem: Fluid Imbalance (Pneumonia)  Goal: Fluid Balance  Outcome: Ongoing, Progressing     Problem: Infection (Pneumonia)  Goal: Resolution of Infection Signs/Symptoms  Outcome: Ongoing, Progressing     Problem: Respiratory Compromise (Pneumonia)  Goal: Effective Oxygenation and Ventilation  Outcome: Ongoing, Progressing     Problem: Bariatric Environmental Safety  Goal: Safety Maintained with Care  Outcome: Ongoing, Progressing     Problem: Infection  Goal: Infection Symptom Resolution  Outcome: Ongoing, Progressing     Problem: Breastfeeding  Goal: Effective Breastfeeding  Outcome: Ongoing, Progressing     Problem: Skin Injury Risk Increased  Goal: Skin Health and Integrity  Outcome: Ongoing, Progressing     Problem: Adjustment to Role Transition (Postpartum  Delivery)  Goal: Successful Maternal Role Transition  Outcome: Ongoing, Progressing     Problem: Bleeding (Postpartum  Delivery)  Goal: Hemostasis  Outcome: Ongoing, Progressing     Problem: Infection (Postpartum  Delivery)  Goal: Absence of Infection Signs/Symptoms  Outcome: Ongoing, Progressing     Problem: Pain (Postpartum  Delivery)  Goal: Acceptable Pain Control  Outcome: Ongoing, Progressing     Problem: Postoperative Nausea and Vomiting (Postpartum  Delivery)  Goal: Nausea and Vomiting Relief  Outcome: Ongoing, Progressing      Problem: Postoperative Urinary Retention (Postpartum  Delivery)  Goal: Effective Urinary Elimination  Outcome: Ongoing, Progressing     Problem: Bleeding (Labor)  Goal: Hemostasis  Outcome: Met     Problem: Change in Fetal Wellbeing (Labor)  Goal: Stable Fetal Wellbeing  Outcome: Met     Problem: Delayed Labor Progression (Labor)  Goal: Effective Progression to Delivery  Outcome: Met     Problem: Infection (Labor)  Goal: Absence of Infection Signs/Symptoms  Outcome: Met     Problem: Labor Pain (Labor)  Goal: Acceptable Pain Control  Outcome: Met     Problem: Uterine Tachysystole (Labor)  Goal: Normal Uterine Contraction Pattern  Outcome: Met

## 2020-11-19 NOTE — DISCHARGE INSTRUCTIONS
Breastfeeding Discharge Instructions       Feed the baby at the earliest sign of hunger or comfort  o Hands to mouth, sucking motions  o Rooting or searching for something to suck on  o Dont wait for crying - it is a sign of distress     The feedings may be 8-12 times per 24hrs and will not follow a schedule   Avoid pacifiers and bottles for the first 4 weeks   Alternate the breast you start the feeding with, or start with the breast that feels the fullest   Switch breasts when the baby takes himself off the breast or falls asleep   Keep offering breasts until the baby looks full, no longer gives hunger signs, and stays asleep when placed on his back in the crib   If the baby is sleepy and wont wake for a feeding, put the baby skin-to-skin dressed in a diaper against the mothers bare chest   Sleep near your baby   The baby should be positioned and latched on to the breast correctly  o Chest-to-chest, chin in the breast  o Babys lips are flipped outward  o Babys mouth is stretched open wide like a shout  o Babys sucking should feel like tugging to the mother  - The baby should be drinking at the breast:  o You should hear swallowing or gulping throughout the feeding  o You should see milk on the babys lips when he comes off the breast  o Your breasts should be softer when the baby is finished feeding  o The baby should look relaxed at the end of feedings  o After the 4th day and your milk is in:  o The babys poop should turn bright yellow and be loose, watery, and seedy  o The baby should have at least 3-4 poops the size of the palm of your hand per day  o The baby should have at least 5-6 wet diapers per day  o The urine should be light yellow in color  You should drink when you are thirsty and eat a healthy diet when you are    hungry.     Take naps to get the rest you need.   Take medications and/or drink alcohol only with permission of your obstetrician    or the babys pediatrician.  You can  also call the Infant Risk Center,   ([phone]), Monday-Friday, 8am-5pm Central time, to get the most   up-to-date evidence-based information on the use of medications during   pregnancy and breastfeeding.      The baby should be examined by a pediatrician at 3-5 days of age.  Once your   milk comes in, the baby should be gaining at least ½ - 1oz each day and should be back to birthweight no later than 10-14 days of age.          Community Resources    [facility] Breastfeeding Warmline: [phone]   Local [facility] clinics: provide incentives and breastpumps to eligible mothers  La Leche [employer] (LLLI):  mother-to-mother support group website        www.[employer]  Local La Leche League mother-to-mother support groups:        www.Kangou        La Leche League [facility]   Dr. Ravi Hebert website for latch videos and general information:        www.breastfeedinginc.ca  Infant Risk Center is a call center that provides information about the safety of taking medications while breastfeeding.  Call [phone], M-F, 8am-5pm, [place].  International Lactation Consultant Association provides resources for assistance:        www.ilca.org  [place] Breastfeeding Coalition provides informationand resources for parents  and the community    www.LaBreastfeedingSupport.org     Chrissy Kasper is a mom-to-mom support group:                             www.[employer].com//breastfeedng-support/  Part[employer] for Healthy Babies:  8-462-690-BABY(9108)  [employer]: a breastfeeding support group for women of color, 567.553.7747

## 2020-11-19 NOTE — CARE UPDATE
Please see last labor note for full details of discussion.  Patient desires primary elective c/s. Balloon was deflated and removed and I offered to AROM patient and see if this would progress her. Patient declines. She is very sad and tired and would just like to be sectioned now. Offered to let patient wait until morning so Dr. Beck would be here as no medical indication to deliver in middle of the night. Patient declines and would like to be delivered now.     Jessica Guerra M.D.  [place] PGY2

## 2020-11-19 NOTE — TRANSFER OF CARE
Anesthesia Transfer of Care Note    Patient: Lauren K Damico    Procedure(s) Performed: * No procedures listed *    Patient location: PACU    Anesthesia Type: epidural    Transport from OR: Transported from OR on room air with adequate spontaneous ventilation    Post pain: adequate analgesia    Post assessment: no apparent anesthetic complications    Post vital signs: stable    Level of consciousness: awake    Nausea/Vomiting: no nausea/vomiting    Complications: none    Transfer of care protocol was followed      Last vitals:   Visit Vitals  /63   Pulse 90   Temp 36.6 °C (97.9 °F) (Oral)   Resp 20   Ht 5' (1.524 m)   Wt 93.6 kg (206 lb 7.4 oz)   LMP 12/12/2019 (Approximate)   SpO2 98%   Breastfeeding No   BMI 40.32 kg/m²

## 2020-11-19 NOTE — LACTATION NOTE
This note was copied from a baby's chart.  [employer] Symphony pump, tubing, collections containers and labels brought to bedside.  Discussed proper pump setting of initiation phase.  Instructed on proper usage of pump and to adjust suction according to maximum comfort level.  Verified appropriate flange fit.  Educated on the frequency and duration of pumping in order to promote and maintain a full milk supply.  Hands on pumping technique reviewed.  Encouraged hand expression after pumping.  Instructed on cleaning of breast pump parts.  Written instructions also given.  Pt verbalized understanding and appropriate recall for proper milk handling, collection, labeling, storage and transportation.

## 2020-11-19 NOTE — LACTATION NOTE
"1000 LC to bedside, pt reports infant not yet latching and not obtaining colostrum with HE. RN brought pump to bedside. LC assisted pt to initiate breast pumping with Symphony double electric breast pump. Applied warm compresses and did "hands on pumping", followed by HE after pumping. Achieved 8ml output. Provided encouragement and teaching on normal pumping output and  stomach size for age. Taught patient to pump 8 or more times in 24 hours, after each breastfeeding for 15 minutes using initiate program in hospital.   Lactation Basics education completed. LC reviewed Breastfeeding Guide and encouraged tracking feeds and output. Encouraged use of STS, frequent feeds on demand, and avoiding artificial nipples. Pt verbalized understanding and questions answered. Pt aware to call LC for assistance with feeding.       1240 Bilateral areola edematous, LC demonstrated reverse pressure softening. Using sandwich hold and max assist infant latches to L breast, 3-5 sucking burst and then comes off breast, nipple angled. Attempted on R side, nipple more oswald, infant latches sustains sucking x 4 minutes, a few audible swallows. After multiple attempts and position changes no significant latch achieved. LC demonstrated cup feeding, pt able to return demonstrate. Support and encouragement provided. Encouraged max S2S and to pump after this feeding. Report given to RN. Pt v/u of education and questions answered.    "

## 2020-11-20 ENCOUNTER — RESEARCH ENCOUNTER (OUTPATIENT)
Dept: RESEARCH | Facility: HOSPITAL | Age: 35
End: 2020-11-20

## 2020-11-20 PROBLEM — Z98.891 S/P CESAREAN SECTION: Status: ACTIVE | Noted: 2020-11-20

## 2020-11-20 PROCEDURE — 25000003 PHARM REV CODE 250: Performed by: OBSTETRICS & GYNECOLOGY

## 2020-11-20 PROCEDURE — 63600175 PHARM REV CODE 636 W HCPCS: Performed by: STUDENT IN AN ORGANIZED HEALTH CARE EDUCATION/TRAINING PROGRAM

## 2020-11-20 PROCEDURE — 25000003 PHARM REV CODE 250: Performed by: STUDENT IN AN ORGANIZED HEALTH CARE EDUCATION/TRAINING PROGRAM

## 2020-11-20 PROCEDURE — 11000001 HC ACUTE MED/SURG PRIVATE ROOM

## 2020-11-20 RX ORDER — DOCUSATE SODIUM 100 MG/1
200 CAPSULE, LIQUID FILLED ORAL 2 TIMES DAILY
Qty: 30 CAPSULE | Refills: 0 | Status: SHIPPED | OUTPATIENT
Start: 2020-11-20 | End: 2020-12-03

## 2020-11-20 RX ORDER — OXYCODONE HYDROCHLORIDE 5 MG/1
5 TABLET ORAL EVERY 4 HOURS PRN
Qty: 20 TABLET | Refills: 0 | Status: SHIPPED | OUTPATIENT
Start: 2020-11-20 | End: 2020-12-03

## 2020-11-20 RX ORDER — ACETAMINOPHEN 325 MG/1
650 TABLET ORAL EVERY 6 HOURS PRN
Qty: 30 TABLET | Refills: 0 | Status: SHIPPED | OUTPATIENT
Start: 2020-11-20 | End: 2022-11-28

## 2020-11-20 RX ADMIN — OXYCODONE HYDROCHLORIDE AND ACETAMINOPHEN 1 TABLET: 10; 325 TABLET ORAL at 10:11

## 2020-11-20 RX ADMIN — SIMETHICONE 80 MG: 80 TABLET, CHEWABLE ORAL at 09:11

## 2020-11-20 RX ADMIN — SIMETHICONE 80 MG: 80 TABLET, CHEWABLE ORAL at 01:11

## 2020-11-20 RX ADMIN — OXYCODONE HYDROCHLORIDE 10 MG: 5 TABLET ORAL at 12:11

## 2020-11-20 RX ADMIN — OXYCODONE HYDROCHLORIDE AND ACETAMINOPHEN 1 TABLET: 10; 325 TABLET ORAL at 09:11

## 2020-11-20 RX ADMIN — PRENATAL VIT W/ FE FUMARATE-FA TAB 27-0.8 MG 1 TABLET: 27-0.8 TAB at 08:11

## 2020-11-20 RX ADMIN — OXYCODONE HYDROCHLORIDE AND ACETAMINOPHEN 1 TABLET: 10; 325 TABLET ORAL at 06:11

## 2020-11-20 RX ADMIN — SIMETHICONE 80 MG: 80 TABLET, CHEWABLE ORAL at 04:11

## 2020-11-20 RX ADMIN — OXYCODONE HYDROCHLORIDE AND ACETAMINOPHEN 1 TABLET: 10; 325 TABLET ORAL at 01:11

## 2020-11-20 RX ADMIN — SIMETHICONE 80 MG: 80 TABLET, CHEWABLE ORAL at 10:11

## 2020-11-20 RX ADMIN — DOCUSATE SODIUM 200 MG: 100 CAPSULE, LIQUID FILLED ORAL at 08:11

## 2020-11-20 RX ADMIN — KETOROLAC TROMETHAMINE 30 MG: 30 INJECTION, SOLUTION INTRAMUSCULAR at 02:11

## 2020-11-20 RX ADMIN — OXYCODONE HYDROCHLORIDE AND ACETAMINOPHEN 1 TABLET: 10; 325 TABLET ORAL at 04:11

## 2020-11-20 RX ADMIN — DOCUSATE SODIUM 200 MG: 100 CAPSULE, LIQUID FILLED ORAL at 07:11

## 2020-11-20 RX ADMIN — MUPIROCIN: 20 OINTMENT TOPICAL at 08:11

## 2020-11-20 NOTE — PROGRESS NOTES
Txa 2017.561 Postpartum follow up:    Visited patient in private post partal room for TXA study follow up. Patient was awake and alert, with significant other and infant at bedside. Pt denies urticaria, nausea and vomiting, diarrhea, dizziness, blurred or other changes in vision.  [place]ard provided to patient and instructed will follow up again with her in 1-2 weeks, and again at 6 wks post partal. Verbalized understanding.

## 2020-11-20 NOTE — PROGRESS NOTES
POSTPARTUM PROGRESS NOTE     Lauren K Damico is a 35 y.o. female POD #1 status post Primary  section at 39w3d in a pregnancy complicated by history of gastric sleeve, nausea/vomiting in 2nd and 3rd trimester, iron deficiency anemia requiring blood and iron transfusions and h/o CIN2. Patient is doing well this morning. She denies nausea, vomiting, fever or chills.  Patient reports mild abdominal pain that is adequately relieved by oral pain medications. Lochia is mild to moderate and decreasing. Patient is voiding without difficulty and ambulating well. She has passed flatus, and has not had BM.  Patient plan to bottle feed. Contraception per primary OB.   Objective:       Temp:  [97.4 °F (36.3 °C)-98.6 °F (37 °C)] 97.7 °F (36.5 °C)  Pulse:  [64-93] 89  Resp:  [18] 18  SpO2:  [95 %-99 %] 95 %  BP: ()/(55-65) 103/62    General:   Alert, appears stated age, cooperative   Lungs:   Normal work of breathing with regular respirations    Heart:   Normal rate    Abdomen:  Soft, appropriately tender   Uterus: Firm and located at the umblicus.    Incision: Bandage in place, clean, dry and intact   Extremities: No pedal edema     Lab Review  No results found for this or any previous visit (from the past 4 hour(s)).    I/O    Intake/Output Summary (Last 24 hours) at 2020 0617  Last data filed at 2020 0045  Gross per 24 hour   Intake --   Output 1050 ml   Net -1050 ml        Assessment:     Patient Active Problem List   Diagnosis    Pneumonia    History of abnormal Pap smear    Moderate dysplasia of cervix (ROSY II)    Iron deficiency anemia    H/O bariatric surgery    History of elective  section    37 weeks gestation of pregnancy        Plan:   1. Postpartum care:  - Patient doing well. Continue routine management and advances.  - Continue PO pain meds. Pain well controlled.  - Heme: H/h .5, Post pending  - Encourage ambulation  - Contraception per primary OB  - Lactation PRN  - Rh  pos    2. Anemia  - followed by cande/one, has had Fe transfusions and 2 u PRBC this pregnency    3. H/o N/V in 2nd and 3rd trimester  - managed with zofran     4. History of gastric sleeve    Dispo: As patient meets milestones, will plan to discharge POD2-4.    Randall Ames MD  OBGYN PGY-2

## 2020-11-20 NOTE — LACTATION NOTE
This note was copied from a baby's chart.  Reason for supplementation: maternal preference  Information provided on benefits of breastfeeding, supply and demand, adequacy of colostrum, feeding frequency and normal  feeding patterns for first days of life. Informed about risks of formula feeding, possible difficulty latching, and potential decreased milk supply. After education, mother still chooses to formula feed.     Recommended use of alternative feeding methods to provide supplementation. Discussed spoon, cup, and syringe feeding. Mother's decision was to formula feed.     Safe formula feeding handout given and reviewed.  Discussed proper hand washing, expiration time of formula, position of baby, position of nipple and bottle while feeding, baby led paced feeding and fullness cues.  Pt verbalized understanding and verbalized appropriate recall.

## 2020-11-20 NOTE — LACTATION NOTE
LC rounds. Pt started formula supplementation overnight d/t preference and infant not latching. Doing MAX skin to skin, pumping bilaterally after formula feeding. Slow flow nipple brought to bedside, demonstrated paced bottle feeding. Pt is content with current plan, wants to attempt latching tomorrow. Support and encouragement provided. Pt v/u of education and questions answered.

## 2020-11-20 NOTE — ANESTHESIA POSTPROCEDURE EVALUATION
Anesthesia Post Evaluation    Patient: Lauren K Damico    Procedure(s) Performed: Procedure(s) (LRB):   SECTION (N/A)    Final Anesthesia Type: epidural    Patient location during evaluation: floor  Patient participation: Yes- Able to Participate  Level of consciousness: awake and alert  Post-procedure vital signs: reviewed and stable  Pain management: adequate  Airway patency: patent    PONV status at discharge: No PONV  Anesthetic complications: no      Cardiovascular status: blood pressure returned to baseline  Respiratory status: spontaneous ventilation, unassisted and room air  Hydration status: euvolemic  Follow-up not needed.          Vitals Value Taken Time   /68 20 1200   Temp 36.8 °C (98.3 °F) 20 1200   Pulse 95 20 1200   Resp 18 20 1347   SpO2 97 % 20 1200         Event Time   Out of Recovery 2020 03:44:00         Pain/Bobby Score: Pain Rating Prior to Med Admin: 9 (2020  1:47 PM)  Pain Rating Post Med Admin: 4 (2020 10:20 AM)

## 2020-11-21 VITALS
DIASTOLIC BLOOD PRESSURE: 74 MMHG | TEMPERATURE: 99 F | SYSTOLIC BLOOD PRESSURE: 121 MMHG | OXYGEN SATURATION: 96 % | HEIGHT: 60 IN | WEIGHT: 206.44 LBS | RESPIRATION RATE: 18 BRPM | BODY MASS INDEX: 40.53 KG/M2 | HEART RATE: 95 BPM

## 2020-11-21 PROCEDURE — 99024 PR POST-OP FOLLOW-UP VISIT: ICD-10-PCS | Mod: ,,, | Performed by: OBSTETRICS & GYNECOLOGY

## 2020-11-21 PROCEDURE — 25000003 PHARM REV CODE 250: Performed by: STUDENT IN AN ORGANIZED HEALTH CARE EDUCATION/TRAINING PROGRAM

## 2020-11-21 PROCEDURE — 25000003 PHARM REV CODE 250: Performed by: OBSTETRICS & GYNECOLOGY

## 2020-11-21 PROCEDURE — 99024 POSTOP FOLLOW-UP VISIT: CPT | Mod: ,,, | Performed by: OBSTETRICS & GYNECOLOGY

## 2020-11-21 RX ADMIN — OXYCODONE HYDROCHLORIDE AND ACETAMINOPHEN 1 TABLET: 10; 325 TABLET ORAL at 06:11

## 2020-11-21 RX ADMIN — OXYCODONE HYDROCHLORIDE AND ACETAMINOPHEN 1 TABLET: 10; 325 TABLET ORAL at 11:11

## 2020-11-21 RX ADMIN — DOCUSATE SODIUM 200 MG: 100 CAPSULE, LIQUID FILLED ORAL at 09:11

## 2020-11-21 RX ADMIN — OXYCODONE HYDROCHLORIDE AND ACETAMINOPHEN 1 TABLET: 10; 325 TABLET ORAL at 02:11

## 2020-11-21 RX ADMIN — PRENATAL VIT W/ FE FUMARATE-FA TAB 27-0.8 MG 1 TABLET: 27-0.8 TAB at 09:11

## 2020-11-21 RX ADMIN — SIMETHICONE 80 MG: 80 TABLET, CHEWABLE ORAL at 06:11

## 2020-11-21 NOTE — LACTATION NOTE
11/21/20 0830   Maternal Assessment   Breast Shape Bilateral:;pendulous   Breast Density Bilateral:;soft   Areola Bilateral:;dense  (moderate areola edema)   Nipples Bilateral:;flat   Maternal Infant Feeding   Maternal Emotional State anxious;assist needed   Infant Positioning clutch/football   Latch Assistance yes  (no latch achieved, too much edema)   Equipment Type   Breast Pump Type double electric, hospital grade   Breast Pump Flange Type hard   Breast Pump Flange Size 24 mm   Breast Pumping   Breast Pumping Interventions frequent pumping encouraged;early pumping promoted   Breast Pumping bilateral breasts pumped until soft;double electric breast pump utilized;pre-pumping breast massage;pre-pumping hand expression;pre-pumping tactile stimulation   Lactation Referrals   Lactation Referrals other (see comments)  ([facility] scheduled for 12/15)   Discharge lactation education reviewed with breastfeeding guide. Mother is pumping and bottle feeding due to difficulty latching. Baby being supplemented with formula, tolerating well. Mother has very large breasts and moderate edema, flat nipples. Baby unable to latch due to swelling. Reviewed engorgement prevention. Demonstrated hand expression, drops expressed.   [facility] scheduled for 12/15 at 1300 for latch assistance.

## 2020-11-21 NOTE — PROGRESS NOTES
POSTPARTUM PROGRESS NOTE     Lauren K Damico is a 35 y.o. female POD #2 status post Primary  section at 39w3d in a pregnancy complicated by history of gastric sleeve, nausea/vomiting in 2nd and 3rd trimester, iron deficiency anemia requiring blood and iron transfusions and h/o CIN2. Patient is doing well this morning. She denies nausea, vomiting, fever or chills.  Patient reports mild abdominal pain that is adequately relieved by oral pain medications. Lochia is mild to moderate and decreasing. Patient is voiding without difficulty and ambulating well. She has passed flatus, and has not had BM.  Patient plan to bottle feed. Contraception per primary OB.   Objective:       Temp:  [97.7 °F (36.5 °C)-98.3 °F (36.8 °C)] 98.2 °F (36.8 °C)  Pulse:  [] 90  Resp:  [17-18] 18  SpO2:  [96 %-98 %] 96 %  BP: (106-122)/(59-77) 122/77    General:   Alert, appears stated age, cooperative   Lungs:   Normal work of breathing with regular respirations    Heart:   Normal rate    Abdomen:  Soft, appropriately tender   Uterus: Firm and located at the umblicus.    Incision: Bandage in place, clean, dry and intact   Extremities: No pedal edema     Lab Review  No results found for this or any previous visit (from the past 4 hour(s)).    I/O    Intake/Output Summary (Last 24 hours) at 2020 0645  Last data filed at 2020 1800  Gross per 24 hour   Intake --   Output 1900 ml   Net -1900 ml        Assessment:     Patient Active Problem List   Diagnosis    Pneumonia    History of abnormal Pap smear    Moderate dysplasia of cervix (ROSY II)    Iron deficiency anemia    H/O bariatric surgery    History of elective  section    37 weeks gestation of pregnancy    S/P  section        Plan:   1. Postpartum care:  - Patient doing well. Continue routine management and advances.  - Continue PO pain meds. Pain well controlled.  - Heme: H/h .5  - Encourage ambulation  - Contraception per primary OB  -  Lactation PRN  - Rh pos    2. Anemia  - followed by cande/one, has had Fe transfusions and 2 u PRBC this pregnency    3. H/o N/V in 2nd and 3rd trimester  - managed with zofran     4. History of gastric sleeve    Dispo: As patient meets milestones, will plan to discharge POD2-4.    Randall Ames MD  OBGYN PGY-2

## 2020-11-21 NOTE — DISCHARGE SUMMARY
Delivery Discharge Summary  Obstetrics      Primary OB Clinician: Xi Beck MD      Admission date: 2020  Discharge date: 2020    Disposition: To home, self care    Discharge Diagnosis List:      Patient Active Problem List   Diagnosis    Pneumonia    History of abnormal Pap smear    Moderate dysplasia of cervix (ROSY II)    Iron deficiency anemia    H/O bariatric surgery    History of elective  section    37 weeks gestation of pregnancy    S/P  section       Procedure: , due to failed IOL    Hospital Course:  Lauren K Damico is a 35 y.o. now , POD #2 who was admitted on 2020 at 39w3d for IOL. Pregnancy was complicated by obesity with a history of bariatric surgery and history of LEEP. Patient was subsequently admitted to labor and delivery unit with signed consents.     Labor course was complicated by failed IOL, and decision was made to proceed with delivery via  which was performed without complications.    Please see delivery note for further details. Her postpartum course was uncomplicated. On discharge day, patient's pain is controlled with oral pain medications. Pt is tolerating ambulation without SOB or CP, and regular diet without N/V. Reports lochia is mild. Denies any HA, vision changes, F/C, LE swelling. Denies any breast pain/soreness.    Pt in stable condition and ready for discharge. She has been instructed to start and/or continue medications and follow up with her obstetrics provider as listed below.    Pertinent studies:  CBC  Recent Labs   Lab 20   WBC 7.82   HGB 11.3*   HCT 34.5*   MCV 89             Immunization History   Administered Date(s) Administered    DTaP 1987, 10/20/1988, 1989, 1989, 1991    HIB 1989    Hepatitis B, Pediatric/Adolescent 1995, 1995, 03/15/1996    IPV 1987, 1987, 1987, 10/20/1988, 1991    Influenza -  Quadrivalent - PF *Preferred* (6 months and older) 2016, 10/04/2017, 10/29/2018, 2019, 2020    Influenza Split 10/13/2010    MMR 1988, 1991    Meningococcal Conjugate (MCV4P) 2005    PPD Test 2010    Td (Adult), Unspecified Formulation 10/07/1998    Tdap 2008, 2016, 2020        Delivery:    Episiotomy: None   Lacerations: None   Repair suture: None   Repair # of packets:     Blood loss (ml):       Birth information:  YOB: 2018   Time of birth: 12:32 AM   Sex: female   Delivery type: , Low Transverse   Gestational Age: 39w3d    Delivery Clinician:      Other providers:       Additional  information:  Forceps:    Vacuum:    Breech:    Observed anomalies      Living?:           APGARS  One minute Five minutes Ten minutes   Skin color:         Heart rate:         Grimace:         Muscle tone:         Breathing:         Totals: 8  9        Placenta: Delivered:       appearance      Patient Instructions:   Current Discharge Medication List      START taking these medications    Details   acetaminophen (TYLENOL) 325 MG tablet Take 2 tablets (650 mg total) by mouth every 6 (six) hours as needed for Pain.  Qty: 30 tablet, Refills: 0      docusate sodium (COLACE) 100 MG capsule Take 2 capsules (200 mg total) by mouth 2 (two) times daily.  Qty: 30 capsule, Refills: 0      oxyCODONE (ROXICODONE) 5 MG immediate release tablet Take 1 tablet (5 mg total) by mouth every 4 (four) hours as needed.  Qty: 20 tablet, Refills: 0    Comments: Quantity prescribed more than 7 day supply? No         CONTINUE these medications which have NOT CHANGED    Details   esomeprazole (NEXIUM) 20 MG capsule Take 20 mg by mouth 2 (two) times daily.  Refills: 0      iron,carb/vit C/vit B12/folic (IRON 100 PLUS ORAL)       !! ondansetron (ZOFRAN) 4 MG tablet Take 1 tablet (4 mg total) by mouth 2 (two) times daily.  Qty: 60 tablet, Refills: 1      !! ondansetron  (ZOFRAN) 4 MG tablet Take 1 tablet (4 mg total) by mouth daily as needed for Nausea.  Qty: 30 tablet, Refills: 1    Associated Diagnoses: 35 weeks gestation of pregnancy; Nausea and vomiting during pregnancy      PNV/iron/omega3/folic acid/min (PRENAT MV-IRON-CA-FR-MK1-CRTSH ORAL)       progesterone (PROMETRIUM) 200 MG capsule Take 1 capsule (200 mg total) by mouth every evening.  Qty: 30 capsule, Refills: 11    Associated Diagnoses: Positive pregnancy test      promethazine (PHENERGAN) 12.5 MG Tab Take 1 tablet (12.5 mg total) by mouth every 6 (six) hours as needed.  Qty: 30 tablet, Refills: 0    Associated Diagnoses: Nausea/vomiting in pregnancy       !! - Potential duplicate medications found. Please discuss with provider.          Discharge Procedure Orders   Diet Adult Regular     Ice to affected area     Other restrictions (specify):   Order Comments: Do not drive while on narcotics. Do not drive until you feel comfortable placing your foot on the break without pain/discomfort.     Pelvic Rest   Order Comments: Do not put anything in your vagina until evaluated by your doctor at your next visit. This includes douching, tampons, sex.     Notify your health care provider if you experience any of the following:  temperature >100.4     Notify your health care provider if you experience any of the following:  persistent nausea and vomiting or diarrhea     Notify your health care provider if you experience any of the following:  severe uncontrolled pain     Notify your health care provider if you experience any of the following:  redness, tenderness, or signs of infection (pain, swelling, redness, odor or green/yellow discharge around incision site)     Notify your health care provider if you experience any of the following:  difficulty breathing or increased cough     Notify your health care provider if you experience any of the following:  severe persistent headache     Notify your health care provider if you  experience any of the following:  worsening rash     Notify your health care provider if you experience any of the following:  persistent dizziness, light-headedness, or visual disturbances     Notify your health care provider if you experience any of the following:  increased confusion or weakness     Remove dressing in 24 hours     Activity as tolerated       Follow-up Information     Xi Beck MD. Schedule an appointment as soon as possible for a visit in 6 weeks.    Specialties: Obstetrics, Obstetrics and Gynecology  Why: Postpartum Visit  Contact information:  [address] [id number] [phone]                    Kamar Macias M.D.  OB/GYN PGY-1

## 2020-11-23 ENCOUNTER — PATIENT MESSAGE (OUTPATIENT)
Dept: OBSTETRICS AND GYNECOLOGY | Facility: CLINIC | Age: 35
End: 2020-11-23

## 2020-11-23 ENCOUNTER — NURSE TRIAGE (OUTPATIENT)
Dept: ADMINISTRATIVE | Facility: CLINIC | Age: 35
End: 2020-11-23

## 2020-11-23 NOTE — TELEPHONE ENCOUNTER
Contacted pt on behalf of Post Procedural Symptom Tracker. Pt verified by first and last name and . Pt denies any fever, cough, or difficulty breathing since procedure. Advised pt if these symptoms do arise to contact OOC or PCP. No follow up needed    Reason for Disposition   Information only question and nurse able to answer    Additional Information   Negative: Nursing judgment   Negative: Nursing judgment   Negative: Nursing judgment   Negative: Nursing judgment    Protocols used: INFORMATION ONLY CALL - NO TRIAGE-A-[place]

## 2020-11-27 ENCOUNTER — HOSPITAL ENCOUNTER (OUTPATIENT)
Facility: OTHER | Age: 35
Discharge: HOME | End: 2020-11-29
Attending: OBSTETRICS & GYNECOLOGY | Admitting: OBSTETRICS & GYNECOLOGY
Payer: COMMERCIAL

## 2020-11-27 ENCOUNTER — RESEARCH ENCOUNTER (OUTPATIENT)
Dept: RESEARCH | Facility: HOSPITAL | Age: 35
End: 2020-11-27

## 2020-11-27 PROCEDURE — 25000003 PHARM REV CODE 250: Performed by: STUDENT IN AN ORGANIZED HEALTH CARE EDUCATION/TRAINING PROGRAM

## 2020-11-27 PROCEDURE — 80053 COMPREHEN METABOLIC PANEL: CPT

## 2020-11-27 PROCEDURE — 85025 COMPLETE CBC W/AUTO DIFF WBC: CPT

## 2020-11-27 PROCEDURE — 99285 EMERGENCY DEPT VISIT HI MDM: CPT

## 2020-11-27 PROCEDURE — G0378 HOSPITAL OBSERVATION PER HR: HCPCS

## 2020-11-27 PROCEDURE — 99284 PR EMERGENCY DEPT VISIT,LEVEL IV: ICD-10-PCS | Mod: 24,,, | Performed by: OBSTETRICS & GYNECOLOGY

## 2020-11-27 PROCEDURE — U0002 COVID-19 LAB TEST NON-CDC: HCPCS

## 2020-11-27 PROCEDURE — 99284 EMERGENCY DEPT VISIT MOD MDM: CPT | Mod: 24,,, | Performed by: OBSTETRICS & GYNECOLOGY

## 2020-11-27 PROCEDURE — 96376 TX/PRO/DX INJ SAME DRUG ADON: CPT

## 2020-11-27 RX ORDER — MAGNESIUM SULFATE HEPTAHYDRATE 40 MG/ML
4 INJECTION, SOLUTION INTRAVENOUS ONCE
Status: COMPLETED | OUTPATIENT
Start: 2020-11-28 | End: 2020-11-28

## 2020-11-27 RX ORDER — SODIUM CHLORIDE, SODIUM LACTATE, POTASSIUM CHLORIDE, CALCIUM CHLORIDE 600; 310; 30; 20 MG/100ML; MG/100ML; MG/100ML; MG/100ML
INJECTION, SOLUTION INTRAVENOUS CONTINUOUS
Status: DISCONTINUED | OUTPATIENT
Start: 2020-11-28 | End: 2020-11-29

## 2020-11-27 RX ORDER — MAGNESIUM SULFATE HEPTAHYDRATE 40 MG/ML
2 INJECTION, SOLUTION INTRAVENOUS CONTINUOUS
Status: DISCONTINUED | OUTPATIENT
Start: 2020-11-27 | End: 2020-11-29

## 2020-11-27 RX ORDER — CALCIUM GLUCONATE 98 MG/ML
1 INJECTION, SOLUTION INTRAVENOUS
Status: DISCONTINUED | OUTPATIENT
Start: 2020-11-27 | End: 2020-11-29 | Stop reason: HOSPADM

## 2020-11-27 RX ORDER — NIFEDIPINE 10 MG/1
10 CAPSULE ORAL ONCE
Status: COMPLETED | OUTPATIENT
Start: 2020-11-27 | End: 2020-11-27

## 2020-11-27 RX ORDER — ONDANSETRON 8 MG/1
8 TABLET, ORALLY DISINTEGRATING ORAL EVERY 8 HOURS PRN
Status: DISCONTINUED | OUTPATIENT
Start: 2020-11-28 | End: 2020-11-29 | Stop reason: HOSPADM

## 2020-11-27 RX ORDER — PROCHLORPERAZINE EDISYLATE 5 MG/ML
5 INJECTION INTRAMUSCULAR; INTRAVENOUS ONCE
Status: COMPLETED | OUTPATIENT
Start: 2020-11-27 | End: 2020-11-28

## 2020-11-27 RX ORDER — BUTALBITAL, ACETAMINOPHEN AND CAFFEINE 50; 325; 40 MG/1; MG/1; MG/1
2 TABLET ORAL ONCE
Status: COMPLETED | OUTPATIENT
Start: 2020-11-27 | End: 2020-11-27

## 2020-11-27 RX ADMIN — MAGNESIUM SULFATE HEPTAHYDRATE 4 G: 40 INJECTION, SOLUTION INTRAVENOUS at 11:11

## 2020-11-27 RX ADMIN — SODIUM CHLORIDE, SODIUM LACTATE, POTASSIUM CHLORIDE, AND CALCIUM CHLORIDE: .6; .31; .03; .02 INJECTION, SOLUTION INTRAVENOUS at 11:11

## 2020-11-27 RX ADMIN — NIFEDIPINE 10 MG: 10 CAPSULE ORAL at 11:11

## 2020-11-27 RX ADMIN — BUTALBITAL, ACETAMINOPHEN, AND CAFFEINE 2 TABLET: 50; 325; 40 TABLET ORAL at 11:11

## 2020-11-27 NOTE — PROGRESS NOTES
TXA 2017.561 study 1-2 Week Postpartum survey note:     Spoke with Ms. Lauren K Damico per telephone for TXA study one week follow up.  Denies hospital visits since discharge greater than 24 hours; or any thromboembolism, stroke, MI, seizure, endometritis, surgical site infections or pelvic abscess. No transfusions or blood products since discharge. No open label TXA or other antifibrinolytics given, no uterotonics given since discharge, and no reoperations since discharge. Patient also denies signs and symptoms of deep vein thrombosis and pulmonary embolism (chest pain, shortness of breath or unilateral lower extremity swelling and tenderness), as well as any signs and symptoms of incisional issues such as drainage, redness, increased pain, fever or separation.  Instructed patient we'd follow up again at 6 weeks postpartum. Patient verbalized understanding.

## 2020-11-28 LAB
ALBUMIN SERPL BCP-MCNC: 3.3 G/DL (ref 3.5–5.2)
ALP SERPL-CCNC: 119 U/L (ref 55–135)
ALT SERPL W/O P-5'-P-CCNC: 9 U/L (ref 10–44)
ANION GAP SERPL CALC-SCNC: 9 MMOL/L (ref 8–16)
AST SERPL-CCNC: 11 U/L (ref 10–40)
BASOPHILS # BLD AUTO: 0.05 K/UL (ref 0–0.2)
BASOPHILS NFR BLD: 0.7 % (ref 0–1.9)
BILIRUB SERPL-MCNC: 0.2 MG/DL (ref 0.1–1)
BUN SERPL-MCNC: 8 MG/DL (ref 6–20)
CALCIUM SERPL-MCNC: 8.7 MG/DL (ref 8.7–10.5)
CHLORIDE SERPL-SCNC: 109 MMOL/L (ref 95–110)
CO2 SERPL-SCNC: 25 MMOL/L (ref 23–29)
CREAT SERPL-MCNC: 0.7 MG/DL (ref 0.5–1.4)
DIFFERENTIAL METHOD: ABNORMAL
EOSINOPHIL # BLD AUTO: 0.3 K/UL (ref 0–0.5)
EOSINOPHIL NFR BLD: 3.5 % (ref 0–8)
ERYTHROCYTE [DISTWIDTH] IN BLOOD BY AUTOMATED COUNT: 13.7 % (ref 11.5–14.5)
EST. GFR  (AFRICAN AMERICAN): >60 ML/MIN/1.73 M^2
EST. GFR  (NON AFRICAN AMERICAN): >60 ML/MIN/1.73 M^2
GLUCOSE SERPL-MCNC: 85 MG/DL (ref 70–110)
HCT VFR BLD AUTO: 32.8 % (ref 37–48.5)
HGB BLD-MCNC: 10.8 G/DL (ref 12–16)
IMM GRANULOCYTES # BLD AUTO: 0.13 K/UL (ref 0–0.04)
IMM GRANULOCYTES NFR BLD AUTO: 1.8 % (ref 0–0.5)
LYMPHOCYTES # BLD AUTO: 2 K/UL (ref 1–4.8)
LYMPHOCYTES NFR BLD: 28.6 % (ref 18–48)
MCH RBC QN AUTO: 29.1 PG (ref 27–31)
MCHC RBC AUTO-ENTMCNC: 32.9 G/DL (ref 32–36)
MCV RBC AUTO: 88 FL (ref 82–98)
MONOCYTES # BLD AUTO: 0.6 K/UL (ref 0.3–1)
MONOCYTES NFR BLD: 8.6 % (ref 4–15)
NEUTROPHILS # BLD AUTO: 4 K/UL (ref 1.8–7.7)
NEUTROPHILS NFR BLD: 56.8 % (ref 38–73)
NRBC BLD-RTO: 0 /100 WBC
PLATELET # BLD AUTO: 512 K/UL (ref 150–350)
PMV BLD AUTO: 8.5 FL (ref 9.2–12.9)
POTASSIUM SERPL-SCNC: 3.4 MMOL/L (ref 3.5–5.1)
PROT SERPL-MCNC: 6.5 G/DL (ref 6–8.4)
RBC # BLD AUTO: 3.71 M/UL (ref 4–5.4)
SARS-COV-2 RDRP RESP QL NAA+PROBE: NEGATIVE
SODIUM SERPL-SCNC: 143 MMOL/L (ref 136–145)
WBC # BLD AUTO: 7.1 K/UL (ref 3.9–12.7)

## 2020-11-28 PROCEDURE — 25000003 PHARM REV CODE 250: Performed by: OBSTETRICS & GYNECOLOGY

## 2020-11-28 PROCEDURE — 63600175 PHARM REV CODE 636 W HCPCS: Performed by: STUDENT IN AN ORGANIZED HEALTH CARE EDUCATION/TRAINING PROGRAM

## 2020-11-28 PROCEDURE — 25000003 PHARM REV CODE 250: Performed by: STUDENT IN AN ORGANIZED HEALTH CARE EDUCATION/TRAINING PROGRAM

## 2020-11-28 PROCEDURE — 99225 PR SUBSEQUENT OBSERVATION CARE,LEVEL II: ICD-10-PCS | Mod: 24,,, | Performed by: OBSTETRICS & GYNECOLOGY

## 2020-11-28 PROCEDURE — 99225 PR SUBSEQUENT OBSERVATION CARE,LEVEL II: CPT | Mod: 24,,, | Performed by: OBSTETRICS & GYNECOLOGY

## 2020-11-28 PROCEDURE — 96375 TX/PRO/DX INJ NEW DRUG ADDON: CPT

## 2020-11-28 PROCEDURE — 96372 THER/PROPH/DIAG INJ SC/IM: CPT | Mod: 59

## 2020-11-28 PROCEDURE — 96365 THER/PROPH/DIAG IV INF INIT: CPT

## 2020-11-28 PROCEDURE — G0378 HOSPITAL OBSERVATION PER HR: HCPCS

## 2020-11-28 PROCEDURE — 96366 THER/PROPH/DIAG IV INF ADDON: CPT

## 2020-11-28 RX ORDER — PANTOPRAZOLE SODIUM 40 MG/1
40 TABLET, DELAYED RELEASE ORAL DAILY
Status: DISCONTINUED | OUTPATIENT
Start: 2020-11-28 | End: 2020-11-29 | Stop reason: HOSPADM

## 2020-11-28 RX ORDER — HYDROCODONE BITARTRATE AND ACETAMINOPHEN 10; 325 MG/1; MG/1
1 TABLET ORAL EVERY 4 HOURS PRN
Status: DISCONTINUED | OUTPATIENT
Start: 2020-11-28 | End: 2020-11-29 | Stop reason: HOSPADM

## 2020-11-28 RX ORDER — DESVENLAFAXINE SUCCINATE 50 MG/1
150 TABLET, EXTENDED RELEASE ORAL ONCE
Status: DISCONTINUED | OUTPATIENT
Start: 2020-11-28 | End: 2020-11-29

## 2020-11-28 RX ORDER — PRENATAL WITH FERROUS FUM AND FOLIC ACID 3080; 920; 120; 400; 22; 1.84; 3; 20; 10; 1; 12; 200; 27; 25; 2 [IU]/1; [IU]/1; MG/1; [IU]/1; MG/1; MG/1; MG/1; MG/1; MG/1; MG/1; UG/1; MG/1; MG/1; MG/1; MG/1
1 TABLET ORAL DAILY
Status: DISCONTINUED | OUTPATIENT
Start: 2020-11-28 | End: 2020-11-29 | Stop reason: HOSPADM

## 2020-11-28 RX ORDER — DIPHENHYDRAMINE HCL 25 MG
25 CAPSULE ORAL EVERY 4 HOURS PRN
Status: DISCONTINUED | OUTPATIENT
Start: 2020-11-29 | End: 2020-11-29 | Stop reason: HOSPADM

## 2020-11-28 RX ORDER — DESVENLAFAXINE SUCCINATE 50 MG/1
50 TABLET, EXTENDED RELEASE ORAL DAILY
Status: DISCONTINUED | OUTPATIENT
Start: 2020-11-28 | End: 2020-11-28

## 2020-11-28 RX ORDER — IBUPROFEN 600 MG/1
600 TABLET ORAL EVERY 6 HOURS
Status: DISCONTINUED | OUTPATIENT
Start: 2020-11-28 | End: 2020-11-28

## 2020-11-28 RX ORDER — BUTALBITAL, ACETAMINOPHEN AND CAFFEINE 50; 325; 40 MG/1; MG/1; MG/1
1 TABLET ORAL EVERY 4 HOURS PRN
Status: DISCONTINUED | OUTPATIENT
Start: 2020-11-28 | End: 2020-11-29 | Stop reason: HOSPADM

## 2020-11-28 RX ORDER — DESVENLAFAXINE SUCCINATE 50 MG/1
200 TABLET, EXTENDED RELEASE ORAL DAILY
Status: DISCONTINUED | OUTPATIENT
Start: 2020-11-29 | End: 2020-11-29

## 2020-11-28 RX ORDER — ACETAMINOPHEN 325 MG/1
650 TABLET ORAL EVERY 6 HOURS PRN
Status: DISCONTINUED | OUTPATIENT
Start: 2020-11-29 | End: 2020-11-29 | Stop reason: HOSPADM

## 2020-11-28 RX ORDER — DOCUSATE SODIUM 100 MG/1
200 CAPSULE, LIQUID FILLED ORAL 2 TIMES DAILY PRN
Status: DISCONTINUED | OUTPATIENT
Start: 2020-11-29 | End: 2020-11-29 | Stop reason: HOSPADM

## 2020-11-28 RX ORDER — SIMETHICONE 80 MG
1 TABLET,CHEWABLE ORAL EVERY 6 HOURS PRN
Status: DISCONTINUED | OUTPATIENT
Start: 2020-11-29 | End: 2020-11-29 | Stop reason: HOSPADM

## 2020-11-28 RX ORDER — HYDROCODONE BITARTRATE AND ACETAMINOPHEN 10; 325 MG/1; MG/1
1 TABLET ORAL EVERY 4 HOURS PRN
Status: DISCONTINUED | OUTPATIENT
Start: 2020-11-29 | End: 2020-11-28

## 2020-11-28 RX ORDER — DIPHENHYDRAMINE HYDROCHLORIDE 50 MG/ML
25 INJECTION INTRAMUSCULAR; INTRAVENOUS EVERY 4 HOURS PRN
Status: DISCONTINUED | OUTPATIENT
Start: 2020-11-29 | End: 2020-11-29 | Stop reason: HOSPADM

## 2020-11-28 RX ORDER — HYDROCODONE BITARTRATE AND ACETAMINOPHEN 5; 325 MG/1; MG/1
1 TABLET ORAL EVERY 4 HOURS PRN
Status: DISCONTINUED | OUTPATIENT
Start: 2020-11-28 | End: 2020-11-29 | Stop reason: HOSPADM

## 2020-11-28 RX ORDER — ENOXAPARIN SODIUM 100 MG/ML
40 INJECTION SUBCUTANEOUS EVERY 12 HOURS
Status: DISCONTINUED | OUTPATIENT
Start: 2020-11-28 | End: 2020-11-29 | Stop reason: HOSPADM

## 2020-11-28 RX ORDER — HYDROCORTISONE 25 MG/G
CREAM TOPICAL 3 TIMES DAILY PRN
Status: DISCONTINUED | OUTPATIENT
Start: 2020-11-29 | End: 2020-11-29 | Stop reason: HOSPADM

## 2020-11-28 RX ORDER — HYDROCODONE BITARTRATE AND ACETAMINOPHEN 5; 325 MG/1; MG/1
1 TABLET ORAL EVERY 4 HOURS PRN
Status: DISCONTINUED | OUTPATIENT
Start: 2020-11-29 | End: 2020-11-28

## 2020-11-28 RX ADMIN — HYDROCODONE BITARTRATE AND ACETAMINOPHEN 1 TABLET: 10; 325 TABLET ORAL at 08:11

## 2020-11-28 RX ADMIN — HYDROCODONE BITARTRATE AND ACETAMINOPHEN 1 TABLET: 10; 325 TABLET ORAL at 03:11

## 2020-11-28 RX ADMIN — MAGNESIUM SULFATE IN WATER 2 G/HR: 40 INJECTION, SOLUTION INTRAVENOUS at 12:11

## 2020-11-28 RX ADMIN — ENOXAPARIN SODIUM 40 MG: 40 INJECTION SUBCUTANEOUS at 09:11

## 2020-11-28 RX ADMIN — DESVENLAFAXINE SUCCINATE 50 MG: 50 TABLET, FILM COATED, EXTENDED RELEASE ORAL at 07:11

## 2020-11-28 RX ADMIN — BUTALBITAL, ACETAMINOPHEN, AND CAFFEINE 1 TABLET: 50; 325; 40 TABLET ORAL at 02:11

## 2020-11-28 RX ADMIN — HYDROCODONE BITARTRATE AND ACETAMINOPHEN 1 TABLET: 10; 325 TABLET ORAL at 02:11

## 2020-11-28 RX ADMIN — PROCHLORPERAZINE EDISYLATE 5 MG: 5 INJECTION INTRAMUSCULAR; INTRAVENOUS at 02:11

## 2020-11-28 RX ADMIN — BUTALBITAL, ACETAMINOPHEN, AND CAFFEINE 1 TABLET: 50; 325; 40 TABLET ORAL at 11:11

## 2020-11-28 RX ADMIN — HYDROCODONE BITARTRATE AND ACETAMINOPHEN 1 TABLET: 10; 325 TABLET ORAL at 11:11

## 2020-11-28 RX ADMIN — PRENATAL VIT W/ FE FUMARATE-FA TAB 27-0.8 MG 1 TABLET: 27-0.8 TAB at 08:11

## 2020-11-28 RX ADMIN — ENOXAPARIN SODIUM 40 MG: 40 INJECTION SUBCUTANEOUS at 08:11

## 2020-11-28 RX ADMIN — ONDANSETRON 8 MG: 8 TABLET, ORALLY DISINTEGRATING ORAL at 09:11

## 2020-11-28 RX ADMIN — PANTOPRAZOLE SODIUM 40 MG: 40 TABLET, DELAYED RELEASE ORAL at 08:11

## 2020-11-28 RX ADMIN — SODIUM CHLORIDE, SODIUM LACTATE, POTASSIUM CHLORIDE, AND CALCIUM CHLORIDE: .6; .31; .03; .02 INJECTION, SOLUTION INTRAVENOUS at 04:11

## 2020-11-28 RX ADMIN — HYDROCODONE BITARTRATE AND ACETAMINOPHEN 1 TABLET: 10; 325 TABLET ORAL at 06:11

## 2020-11-28 RX ADMIN — MAGNESIUM SULFATE IN WATER 2 G/HR: 40 INJECTION, SOLUTION INTRAVENOUS at 04:11

## 2020-11-28 RX ADMIN — BUTALBITAL, ACETAMINOPHEN, AND CAFFEINE 1 TABLET: 50; 325; 40 TABLET ORAL at 06:11

## 2020-11-28 NOTE — H&P
[facility]  Obstetrics & Gynecology  History & Physical    Patient Name: Lauren K Damico  MRN: [id number]  Admission Date: 2020  Primary Care Provider: Marie Fang MD    Subjective:     Chief Complaint/Reason for Admission: Postpartum PreE with SF    History of Present Illness: Lauren K Damico is a 35 y.o. [id number] at POD #8 s/p pLTCS who presents with HA and elevated BPs on home monitor. The patient reports a HA which has worsened over the course of the day today. The HA has been unresponsive to Tylenol and Sudafed. She describes the HA as a tension-type with sinus pressure. Due to her HA, she checked her BP on home cuff and noted severe range BPs in 170s-180s systolic. She reports intermittent spots in vision. Also endorses RUQ pain but denies any CP or SOB. Denies any new or significant swelling. Pregnancy was complicated by MO, h/o gastric bypass surgery, anxiety and anemia (requiring blood transfusion).    In ANTHONY patient was found to have sustained severe range BPs, requiring Procardia 10 IR. MgSO4 was initiated for seizure prophylaxis and patient was admitted for close monitoring and 24 hours of magnesium.    No current facility-administered medications on file prior to encounter.      Current Outpatient Medications on File Prior to Encounter   Medication Sig    acetaminophen (TYLENOL) 325 MG tablet Take 2 tablets (650 mg total) by mouth every 6 (six) hours as needed for Pain.    docusate sodium (COLACE) 100 MG capsule Take 2 capsules (200 mg total) by mouth 2 (two) times daily.    esomeprazole (NEXIUM) 20 MG capsule Take 20 mg by mouth 2 (two) times daily.    iron,carb/vit C/vit B12/folic (IRON 100 PLUS ORAL)     ondansetron (ZOFRAN) 4 MG tablet Take 1 tablet (4 mg total) by mouth 2 (two) times daily.    ondansetron (ZOFRAN) 4 MG tablet Take 1 tablet (4 mg total) by mouth daily as needed for Nausea.    oxyCODONE (ROXICODONE) 5 MG immediate release tablet Take 1 tablet (5 mg  total) by mouth every 4 (four) hours as needed.    PNV/iron/omega3/folic acid/min (PRENAT MV-IRON-CA-OV-EL8-RAIVE ORAL)     progesterone (PROMETRIUM) 200 MG capsule Take 1 capsule (200 mg total) by mouth every evening.    promethazine (PHENERGAN) 12.5 MG Tab Take 1 tablet (12.5 mg total) by mouth every 6 (six) hours as needed.       Review of patient's allergies indicates:  No Known Allergies    Past Medical History:   Diagnosis Date    Abnormal Pap smear 12    colpo/LEEP    Anemia     Anxiety     PCOS (polycystic ovarian syndrome)      OB History    Para Term  AB Living   1 1 1     1   SAB TAB Ectopic Multiple Live Births         0 1      # Outcome Date GA Lbr Cortez/2nd Weight Sex Delivery Anes PTL Lv   1 Term 20 39w3d  3.59 kg (7 lb 14.6 oz) F CS-LTranv EPI N MIGUEL     Past Surgical History:   Procedure Laterality Date    CERVICAL BIOPSY  W/ LOOP ELECTRODE EXCISION  10-17-12     SECTION N/A 2020    Procedure:  SECTION;  Surgeon: Karo Zayas MD;  Location: [facility] L&D;  Service: OB/GYN;  Laterality: N/A;    GALLBLADDER SURGERY      GASTRIC RESTRICTION SURGERY      gastric sleeve in 2018    WISDOM TOOTH EXTRACTION       Family History     Problem Relation (Age of Onset)    Breast cancer Paternal Aunt, Paternal Aunt    Ovarian cancer Paternal Grandmother, Paternal Aunt    Uterine cancer Paternal Aunt        Tobacco Use    Smoking status: Never Smoker    Smokeless tobacco: Never Used   Substance and Sexual Activity    Alcohol use: Yes     Alcohol/week: 1.0 standard drinks     Types: 1 Glasses of wine per week     Frequency: Never     Drinks per session: 1 or 2     Binge frequency: Never    Drug use: No    Sexual activity: Yes     Partners: Male     Birth control/protection: None     Review of Systems   Constitutional: Negative for chills, fatigue and fever.   HENT: Negative for congestion.    Eyes: Positive for visual disturbance.   Respiratory:  Negative for cough and shortness of breath.    Cardiovascular: Negative for chest pain and leg swelling.   Gastrointestinal: Positive for abdominal pain (RUQ pain). Negative for nausea and vomiting.   Genitourinary: Negative for dysuria, pelvic pain, vaginal bleeding and vaginal discharge.   Musculoskeletal: Negative for back pain.   Skin: Negative for rash.   Neurological: Positive for headaches.     Objective:     Vital Signs (Most Recent):  Temp: 97.7 °F (36.5 °C) (11/28/20 0114)  Pulse: 62 (11/28/20 0202)  Resp: 18 (11/28/20 0202)  BP: 126/68 (11/28/20 0202)  SpO2: 97 % (11/28/20 0202) Vital Signs (24h Range):  Temp:  [97.7 °F (36.5 °C)-97.8 °F (36.6 °C)] 97.7 °F (36.5 °C)  Pulse:  [] 62  Resp:  [16-18] 18  SpO2:  [97 %-100 %] 97 %  BP: (103-166)/(56-88) 126/68        There is no height or weight on file to calculate BMI.  Patient's last menstrual period was 12/12/2019 (approximate).    Physical Exam     Vitals reviewed.  Constitutional: She appears well-developed and well-nourished. No distress.   HENT:   Head: Normocephalic and atraumatic.   Eyes: EOM are normal.   Neck: Normal range of motion.   Cardiovascular: Normal rate.   Pulmonary/Chest: Breath sounds normal.   Abdominal: Soft. There is abdominal tenderness (Mild TTP in RUQ). There is no rebound and no guarding.   Fundus firm below umbilicus, well healing Pfannenstiel incision   Musculoskeletal: Normal range of motion. Edema (Trace pedal edema bilaterally) present.   Neurological: She is alert and oriented to person, place, and time.   Skin: Skin is warm and dry.   Psychiatric: She has a normal mood and affect. Thought content normal.    Laboratory:  CBC:   Recent Labs   Lab 11/27/20  2351   WBC 7.10   RBC 3.71*   HGB 10.8*   HCT 32.8*   *   MCV 88   MCH 29.1   MCHC 32.9     CMP:   Recent Labs   Lab 11/27/20  2351   GLU 85   CALCIUM 8.7   ALBUMIN 3.3*   PROT 6.5      K 3.4*   CO2 25      BUN 8   CREATININE 0.7   ALKPHOS 119   ALT  9*   AST 11   BILITOT 0.2     I have personally reviewed all pertinent lab results from the last 24 hours.      Assessment/Plan:     Active Diagnoses:    Diagnosis Date Noted POA    Severe pre-eclampsia, postpartum [O14.15] 11/27/2020 Yes      Problems Resolved During this Admission:     1. Postpartum PreE with SF:   - Patient with sustained severe range blood pressures on admit  - S/p Procardia 10 IR in [place]THONY given due to HR in the 50s, BP with good response  - Patient now meets criteria for PreE with SF given severe range BPs and HA non-responsive to Tylenol; Fioricet and Compazine were given for HA with some improvement  - Admit to MBU unit   - Pre-E Labs - 7/10/33/512,  Cr 0.7,  AST/ALT 9/11  - Initiate therapy with MgSO4 for seizure prophylaxis - Plan for 4g load followed by 2g per hour. Will closely monitor for UOP and for signs of MgSO4 toxicity.   - Will continue to monitor BP closely and consider initiation of an oral antihypertensive agent   - On-call Staff updated and in agreement with plan    2. Postpartum Care:  - Continue routine management  - Heme: H/h 10.8/32.8   - Encourage ambulation  - Lactation consult PRN    3. Anxiety:  - Pt very overwhelmed by first few weeks of motherhood, no SI/HI  - Was previously on an SNRI, discontinued during pregnancy  - Discussed option for restarting, patient will reconsider    4. Maternal obesity:  - Current BMI 40  - SCDs in bed  - Lovenox BID for DVT prophylaxis given s/p c/s and PreE with SF    5. H/o gastric bypass surgery:  - Avoid oral NSAIDs      Karo Jordan MD   PGY-2  Obstetrics & Gynecology  [facility]

## 2020-11-28 NOTE — PROGRESS NOTES
POSTPARTUM PROGRESS NOTE    History of Present Illness: Lauren K Damico is a 35 y.o. [id number] at POD #8 s/p pLTCS who presents with HA and elevated BPs on home monitor. The patient reports a HA which has worsened over the course of the day today. The HA has been unresponsive to Tylenol and Sudafed. She describes the HA as a tension-type with sinus pressure. Due to her HA, she checked her BP on home cuff and noted severe range BPs in 170s-180s systolic. She reports intermittent spots in vision. Also endorses RUQ pain but denies any CP or SOB. Denies any new or significant swelling. Pregnancy was complicated by MO, h/o gastric bypass surgery, anxiety and anemia (requiring blood transfusion).    Interval history:  POD #9, patient able to rest overnight. Reports that Fioricet completely relieved her HA but then HA returned. Received some relief from Compazine and [place] PRN. Denies any vision changes, SOB, CP, or RUQ pain currently. Is doing well otherwise and less anxious this AM.    Objective:       Temp:  [97.7 °F (36.5 °C)-98 °F (36.7 °C)] 98 °F (36.7 °C)  Pulse:  [] 73  Resp:  [16-18] 18  SpO2:  [95 %-100 %] 98 %  BP: (103-166)/(56-88) 125/75    General:   Alert, appears stated age and cooperative   Lungs:   Non-labored respirations    Heart:   Regular rate and rhythm   Abdomen:  Soft, non-distended    Uterus: Firm located below the umbilicus    Incision: Clean, dry and intact without erythema or drainage   Extremities: No pedal edema noted, reflexes 2+ bilaterally     Lab Review  No results found for this or any previous visit (from the past 4 hour(s)).    I/O    Intake/Output Summary (Last 24 hours) at 2020 0807  Last data filed at 2020 0655  Gross per 24 hour   Intake --   Output 2950 ml   Net -2950 ml        Assessment:     Patient Active Problem List   Diagnosis    Pneumonia    History of abnormal Pap smear    Moderate dysplasia of cervix (ROSY II)    Iron deficiency anemia    H/O  bariatric surgery    History of elective  section    37 weeks gestation of pregnancy    S/P  section    Severe pre-eclampsia, postpartum        Plan:   1. Postpartum PreE with SF:   - Meets criteria for PreE with SF given severe range BPs and HA non-responsive to Tylenol  - Has not required any additional anti-hypertensive medications since admission  - Asx this AM  - Pre-E Labs - 7/10/33/512,  Cr 0.7,  AST/ALT   - Continue therapy with MgSO4 for seizure prophylaxis at 2g/hour for 24 hours  - UOP adequate since admission, no signs of Mg toxicity  - Will continue to monitor BP closely and consider initiation of an oral antihypertensive agent, does not currently meet criteria     2. Postpartum Care:  - Continue routine management  - Heme: H/h 10.8/32.8   - Encourage ambulation  - Lactation consult PRN     3. Anxiety:  - Pt very overwhelmed by first few weeks of motherhood, no SI/HI  - Was previously on an SNRI, discontinued during pregnancy  - Discussed option for restarting, patient will consider  - Mood stable this AM     4. Maternal obesity:  - Current BMI 40  - SCDs in bed  - Lovenox BID for DVT prophylaxis given s/p c/s and PreE with SF     5. H/o gastric bypass surgery:  - Avoid oral NSAIDs          Karo Jordan MD   PGY-2  Obstetrics & Gynecology  [facility] [place]

## 2020-11-28 NOTE — PROGRESS NOTES
RN notified Dr. Jordan of Pt c/o throbbing headache return pain scale 7/10. RN will administer compazine as ordered and cont to monitor closely

## 2020-11-28 NOTE — ED PROVIDER NOTES
Encounter Date: 2020       History     Chief Complaint   Patient presents with    Headache    Hypertension     Lauren K Damico is a 35 y.o. [id number] at POD #8 s/p pLTCS who presents with HA and elevated BPs on home monitor. The patient reports a HA which has worsened over the course of the day today. The HA has been unresponsive to Tylenol and Sudafed. She describes the HA as a tension-type with sinus pressure. Due to her HA, she checked her BP on home cuff and noted severe range BPs in 170s-180s systolic. She reports intermittent spots in vision. Also endorses RUQ pain but denies any CP or SOB. Denies any new or significant swelling. Pregnancy was complicated by MO, h/o gastric bypass surgery, anxiety and anemia (requiring blood transfusion).        Review of patient's allergies indicates:  No Known Allergies  Past Medical History:   Diagnosis Date    Abnormal Pap smear 12    colpo/LEEP    Anemia     Anxiety     PCOS (polycystic ovarian syndrome)      Past Surgical History:   Procedure Laterality Date    CERVICAL BIOPSY  W/ LOOP ELECTRODE EXCISION  10-17-12     SECTION N/A 2020    Procedure:  SECTION;  Surgeon: Karo Zayas MD;  Location: [facility] L&D;  Service: OB/GYN;  Laterality: N/A;    GALLBLADDER SURGERY      GASTRIC RESTRICTION SURGERY      gastric sleeve in 2018    WISDOM TOOTH EXTRACTION       Family History   Problem Relation Age of Onset    Ovarian cancer Paternal Grandmother     Breast cancer Paternal Aunt     Ovarian cancer Paternal Aunt     Breast cancer Paternal Aunt     Uterine cancer Paternal Aunt     Colon cancer Neg Hx      Social History     Tobacco Use    Smoking status: Never Smoker    Smokeless tobacco: Never Used   Substance Use Topics    Alcohol use: Yes     Alcohol/week: 1.0 standard drinks     Types: 1 Glasses of wine per week     Frequency: Never     Drinks per session: 1 or 2     Binge frequency: Never    Drug use: No     Review  of Systems   Constitutional: Negative for chills, fatigue and fever.   HENT: Negative for congestion.    Eyes: Positive for visual disturbance.   Respiratory: Negative for cough and shortness of breath.    Cardiovascular: Negative for chest pain and leg swelling.   Gastrointestinal: Positive for abdominal pain (RUQ pain). Negative for nausea and vomiting.   Genitourinary: Negative for dysuria, pelvic pain, vaginal bleeding and vaginal discharge.   Musculoskeletal: Negative for back pain.   Skin: Negative for rash.   Neurological: Positive for headaches.       Physical Exam     Initial Vitals   BP Pulse Resp Temp SpO2   11/27/20 2308 11/27/20 2308 11/27/20 2308 11/27/20 2308 11/27/20 2309   (!) 166/88 (!) 52 16 97.8 °F (36.6 °C) 98 %      MAP       --                Temp:  [97.7 °F (36.5 °C)-98 °F (36.7 °C)] 98 °F (36.7 °C)  Pulse:  [] 73  Resp:  [16-18] 18  SpO2:  [95 %-100 %] 98 %  BP: (103-166)/(56-88) 125/75    Physical Exam    Vitals reviewed.  Constitutional: She appears well-developed and well-nourished. No distress.   HENT:   Head: Normocephalic and atraumatic.   Eyes: EOM are normal.   Neck: Normal range of motion.   Cardiovascular: Normal rate.   Pulmonary/Chest: Breath sounds normal. No respiratory distress.   Abdominal: Soft. There is abdominal tenderness (Mild TTP in RUQ). There is no rebound and no guarding.   Fundus firm below umbilicus, well healing Pfannenstiel incision   Musculoskeletal: Normal range of motion. Edema (Trace pedal edema bilaterally) present. No tenderness.   Neurological: She is alert and oriented to person, place, and time.   Skin: Skin is warm and dry.   Psychiatric: She has a normal mood and affect. Thought content normal.         ED Course   Procedures  Labs Reviewed   CBC W/ AUTO DIFFERENTIAL - Abnormal; Notable for the following components:       Result Value    RBC 3.71 (*)     Hemoglobin 10.8 (*)     Hematocrit 32.8 (*)     Platelets 512 (*)     MPV 8.5 (*)     Immature  Granulocytes 1.8 (*)     Immature Grans (Abs) 0.13 (*)     All other components within normal limits   COMPREHENSIVE METABOLIC PANEL - Abnormal; Notable for the following components:    Potassium 3.4 (*)     Albumin 3.3 (*)     ALT 9 (*)     All other components within normal limits   SARS-COV-2 RNA AMPLIFICATION, QUAL          Imaging Results    None          Medical Decision Making:   Initial Assessment:   Lauren K Damico is a 35 y.o. [id number] at POD #8 s/p pLTCS who presents with HA and elevated BPs on home monitor.    ED Management:  Patient with sustained severe range blood pressures on admit. Procardia 10 IR given due to HR in the 50s, BP with good response. Patient now meets criteria for PreE with SF given severe range BPs and HA non-responsive to Tylenol. Fioricet and compazine for HA with some improvement. MgSO4 inititated for seizure prophylaxis. Will re-admit to MBU for 24 hours of MgSO4 and possible initiation of oral anti-hypertensive agent.    Other:   I have discussed this case with another health care provider.       <> Summary of the Discussion: Dr. Barr              Attending Attestation:   Physician Attestation Statement for Resident:  As the supervising MD   Physician Attestation Statement: I have personally seen and examined this patient.   I agree with the above history. -:   As the supervising MD I agree with the above PE.    As the supervising MD I agree with the above treatment, course, plan, and disposition.   -: Patient evaluated and found to be stable, agree with resident's assessment and plan.  I was personally present during the critical portions of the procedure(s) performed by the resident and was immediately available in the ED to provide services and assistance as needed during the entire procedure.  I have reviewed and agree with the residents interpretation of the following: lab data.  I have reviewed the following: old records at this facility.                               Clinical Impression:     ICD-10-CM ICD-9-CM   1. Pre-eclampsia, severe, postpartum condition  O14.15 642.54                      Disposition:   Disposition: Admitted  Condition: Stable     ED Disposition Condition    Send to L&D            Karo Jordan M.D.  OB/GYN PGY-2     Karo Jordan MD  Resident  11/28/20 0752       Alyssa Barr MD  11/29/20 0942

## 2020-11-29 ENCOUNTER — PATIENT MESSAGE (OUTPATIENT)
Dept: OBSTETRICS AND GYNECOLOGY | Facility: CLINIC | Age: 35
End: 2020-11-29

## 2020-11-29 VITALS
HEART RATE: 68 BPM | OXYGEN SATURATION: 97 % | RESPIRATION RATE: 18 BRPM | SYSTOLIC BLOOD PRESSURE: 130 MMHG | TEMPERATURE: 99 F | DIASTOLIC BLOOD PRESSURE: 71 MMHG

## 2020-11-29 PROCEDURE — 96372 THER/PROPH/DIAG INJ SC/IM: CPT

## 2020-11-29 PROCEDURE — 25000003 PHARM REV CODE 250: Performed by: OBSTETRICS & GYNECOLOGY

## 2020-11-29 PROCEDURE — 25000003 PHARM REV CODE 250: Performed by: STUDENT IN AN ORGANIZED HEALTH CARE EDUCATION/TRAINING PROGRAM

## 2020-11-29 PROCEDURE — G0378 HOSPITAL OBSERVATION PER HR: HCPCS

## 2020-11-29 PROCEDURE — 63600175 PHARM REV CODE 636 W HCPCS: Performed by: STUDENT IN AN ORGANIZED HEALTH CARE EDUCATION/TRAINING PROGRAM

## 2020-11-29 PROCEDURE — 99024 POSTOP FOLLOW-UP VISIT: CPT | Mod: ,,, | Performed by: OBSTETRICS & GYNECOLOGY

## 2020-11-29 PROCEDURE — 99024 PR POST-OP FOLLOW-UP VISIT: ICD-10-PCS | Mod: ,,, | Performed by: OBSTETRICS & GYNECOLOGY

## 2020-11-29 RX ORDER — BUTALBITAL, ACETAMINOPHEN AND CAFFEINE 50; 325; 40 MG/1; MG/1; MG/1
1 TABLET ORAL EVERY 6 HOURS PRN
Qty: 30 TABLET | Refills: 0 | Status: SHIPPED | OUTPATIENT
Start: 2020-11-29 | End: 2020-12-29

## 2020-11-29 RX ORDER — DESVENLAFAXINE SUCCINATE 50 MG/1
50 TABLET, EXTENDED RELEASE ORAL DAILY
Qty: 30 TABLET | Refills: 1 | Status: SHIPPED | OUTPATIENT
Start: 2020-11-29 | End: 2021-01-19 | Stop reason: SDUPTHER

## 2020-11-29 RX ORDER — DESVENLAFAXINE SUCCINATE 50 MG/1
50 TABLET, EXTENDED RELEASE ORAL DAILY
Status: DISCONTINUED | OUTPATIENT
Start: 2020-11-29 | End: 2020-11-29 | Stop reason: HOSPADM

## 2020-11-29 RX ADMIN — PANTOPRAZOLE SODIUM 40 MG: 40 TABLET, DELAYED RELEASE ORAL at 08:11

## 2020-11-29 RX ADMIN — BUTALBITAL, ACETAMINOPHEN, AND CAFFEINE 1 TABLET: 50; 325; 40 TABLET ORAL at 06:11

## 2020-11-29 RX ADMIN — HYDROCODONE BITARTRATE AND ACETAMINOPHEN 1 TABLET: 10; 325 TABLET ORAL at 10:11

## 2020-11-29 RX ADMIN — ENOXAPARIN SODIUM 40 MG: 40 INJECTION SUBCUTANEOUS at 08:11

## 2020-11-29 RX ADMIN — HYDROCODONE BITARTRATE AND ACETAMINOPHEN 1 TABLET: 10; 325 TABLET ORAL at 06:11

## 2020-11-29 RX ADMIN — BUTALBITAL, ACETAMINOPHEN, AND CAFFEINE 1 TABLET: 50; 325; 40 TABLET ORAL at 10:11

## 2020-11-29 RX ADMIN — PRENATAL VIT W/ FE FUMARATE-FA TAB 27-0.8 MG 1 TABLET: 27-0.8 TAB at 08:11

## 2020-11-29 NOTE — PROGRESS NOTES
POSTPARTUM PROGRESS NOTE    History of Present Illness: Lauren K Damico is a 35 y.o. [id number] at POD #8 s/p pLTCS who presents with HA and elevated BPs on home monitor. The patient reports a HA which has worsened over the course of the day today. The HA has been unresponsive to Tylenol and Sudafed. She describes the HA as a tension-type with sinus pressure. Due to her HA, she checked her BP on home cuff and noted severe range BPs in 170s-180s systolic. She reports intermittent spots in vision. Also endorses RUQ pain but denies any CP or SOB. Denies any new or significant swelling. Pregnancy was complicated by MO, h/o gastric bypass surgery, anxiety and anemia (requiring blood transfusion).    Interval history:  POD #9, patient able to rest overnight. Reports that Fioricet completely relieved her HA but then HA returned. Received some relief from Compazine and [place] PRN. Denies any vision changes, SOB, CP, or RUQ pain currently. Is doing well otherwise and less anxious this AM.  POD#10. S/p magnesium for seizure ppx. Patient stated that her anxiety was overall much increased over the past few days so she desired to be restarted on her home medication desvenlafaxine. Started at 50mg dose QD. Understands possible risks with breastfeeding. Denies any HA, vision changes, SOB, CP, RUQ pain, or increased swelling this morning.     Objective:       Temp:  [97.7 °F (36.5 °C)-98.2 °F (36.8 °C)] 97.7 °F (36.5 °C)  Pulse:  [55-93] 79  Resp:  [18-20] 20  SpO2:  [88 %-100 %] 98 %  BP: (109-142)/(61-86) 115/64    General:   Alert, appears stated age and cooperative   Lungs:   Non-labored respirations    Heart:   Regular rate and rhythm   Abdomen:  Soft, non-distended    Uterus: Firm located below the umbilicus    Incision: Clean, dry and intact without erythema or drainage   Extremities: No pedal edema noted, reflexes 2+ bilaterally     Lab Review  No results found for this or any previous visit (from the past 4  hour(s)).    I/O    Intake/Output Summary (Last 24 hours) at 2020 0032  Last data filed at 2020 2300  Gross per 24 hour   Intake 2886.67 ml   Output 4350 ml   Net -1463.33 ml        Assessment:     Patient Active Problem List   Diagnosis    Pneumonia    History of abnormal Pap smear    Moderate dysplasia of cervix (ROSY II)    Iron deficiency anemia    H/O bariatric surgery    History of elective  section    37 weeks gestation of pregnancy    S/P  section    Severe pre-eclampsia, postpartum        Plan:   1. Postpartum PreE with SF:   - Meets criteria for PreE with SF given severe range BPs and HA non-responsive to Tylenol  - Has not required any additional anti-hypertensive medications since admission  - Asx this AM  - Pre-E Labs - 7/10/33/,  Cr 0.7,  AST/ALT   - s/p magnesium for 24 hours  - UOP adequate since admission, no signs of Mg toxicity  - Will continue to monitor BP closely and consider initiation of an oral antihypertensive agent, does not currently meet criteria     2. Postpartum Care:  - Continue routine management  - Heme: H/h 10.8/32.8   - Encourage ambulation  - Lactation consult PRN     3. Anxiety:  - Pt very overwhelmed by first few weeks of motherhood, no SI/HI  - Was previously on an SNRI, discontinued during pregnancy  - Discussed option for restarting, patient restarted on desvenlafaxine 50mg due to increased anxiety symptoms.   - Mood stable this AM     4. Maternal obesity:  - Current BMI 40  - SCDs in bed  - Lovenox BID for DVT prophylaxis given s/p c/s and PreE with SF     5. H/o gastric bypass surgery:  - Avoid oral NSAIDs      Dispo: Plan to DC when BP well controlled and no s/s of pre-e     Jazzy Tiwari M.D.   OB/GYN  PGY-2

## 2020-11-29 NOTE — DISCHARGE SUMMARY
Delivery Discharge Summary  Obstetrics      Primary OB Clinician: Xi Beck MD      Admission date: 2020  Discharge date: 2020    Disposition: To home, self care    Discharge Diagnosis List:      Patient Active Problem List   Diagnosis    Pneumonia    History of abnormal Pap smear    Moderate dysplasia of cervix (ROSY II)    Iron deficiency anemia    H/O bariatric surgery    History of elective  section    37 weeks gestation of pregnancy    S/P  section    Severe pre-eclampsia, postpartum       History of Present Illness: Lauren K Damico is a 35 y.o. [id number] at POD #8 s/p pLTCS who presents with HA and elevated BPs on home monitor. The patient reports a HA which has worsened over the course of the day today. The HA has been unresponsive to Tylenol and Sudafed. She describes the HA as a tension-type with sinus pressure. Due to her HA, she checked her BP on home cuff and noted severe range BPs in 170s-180s systolic. She reports intermittent spots in vision. Also endorses RUQ pain but denies any CP or SOB. Denies any new or significant swelling. Pregnancy was complicated by MO, h/o gastric bypass surgery, anxiety and anemia (requiring blood transfusion).     Interval history:  POD #9, patient able to rest overnight. Reports that Fioricet completely relieved her HA but then HA returned. Received some relief from Compazine and [place] PRN. Denies any vision changes, SOB, CP, or RUQ pain currently. Is doing well otherwise and less anxious this AM.  POD#10. S/p magnesium for seizure ppx. Patient stated that her anxiety was overall much increased over the past few days so she desired to be restarted on her home medication desvenlafaxine. Started at 50mg dose QD. Understands possible risks with breastfeeding. Denies any HA, vision changes, SOB, CP, RUQ pain, or increased swelling this morning.     On day of discharge her BP was well controlled without  PO medications. She was  instructed to follow up in clinic this coming week for a blood pressure check. No acute s/s of pre-e on day of discharge.       Pertinent studies:  CBC  Recent Labs   Lab 20  2351   WBC 7.10   HGB 10.8*   HCT 32.8*   MCV 88   *        Recent Labs   Lab 20  2351      K 3.4*      CO2 25   BUN 8   CREATININE 0.7   GLU 85   PROT 6.5   BILITOT 0.2   ALKPHOS 119   ALT 9*   AST 11        Immunization History   Administered Date(s) Administered    DTaP 1987, 10/20/1988, 1989, 1989, 1991    HIB 1989    Hepatitis B, Pediatric/Adolescent 1995, 1995, 03/15/1996    IPV 1987, 1987, 1987, 10/20/1988, 1991    Influenza - Quadrivalent - PF *Preferred* (6 months and older) 2016, 10/04/2017, 10/29/2018, 2019, 2020    Influenza Split 10/13/2010    MMR 1988, 1991    Meningococcal Conjugate (MCV4P) 2005    PPD Test 2010    Td (Adult), Unspecified Formulation 10/07/1998    Tdap 2008, 2016, 2020        Delivery:    Episiotomy: None   Lacerations: None   Repair suture: None   Repair # of packets:     Blood loss (ml):       Birth information:  YOB: 2018   Time of birth: 12:32 AM   Sex: female   Delivery type: , Low Transverse   Gestational Age: 39w3d    Delivery Clinician:      Other providers:       Additional  information:  Forceps:    Vacuum:    Breech:    Observed anomalies      Living?:           APGARS  One minute Five minutes Ten minutes   Skin color:         Heart rate:         Grimace:         Muscle tone:         Breathing:         Totals: 8  9        Placenta: Delivered:       appearance      Patient Instructions:   Current Discharge Medication List      START taking these medications    Details   butalbital-acetaminophen-caffeine -40 mg (FIORICET, ESGIC) -40 mg per tablet Take 1 tablet by mouth every 6 (six) hours as needed for  Headaches. Do not exceed 6 tablets in 24 hours.  Qty: 30 tablet, Refills: 0      desvenlafaxine succinate (PRISTIQ) 50 MG Tb24 Take 1 tablet (50 mg total) by mouth once daily.  Qty: 30 tablet, Refills: 1         CONTINUE these medications which have NOT CHANGED    Details   acetaminophen (TYLENOL) 325 MG tablet Take 2 tablets (650 mg total) by mouth every 6 (six) hours as needed for Pain.  Qty: 30 tablet, Refills: 0      docusate sodium (COLACE) 100 MG capsule Take 2 capsules (200 mg total) by mouth 2 (two) times daily.  Qty: 30 capsule, Refills: 0      esomeprazole (NEXIUM) 20 MG capsule Take 20 mg by mouth 2 (two) times daily.  Refills: 0      iron,carb/vit C/vit B12/folic (IRON 100 PLUS ORAL)       !! ondansetron (ZOFRAN) 4 MG tablet Take 1 tablet (4 mg total) by mouth 2 (two) times daily.  Qty: 60 tablet, Refills: 1      !! ondansetron (ZOFRAN) 4 MG tablet Take 1 tablet (4 mg total) by mouth daily as needed for Nausea.  Qty: 30 tablet, Refills: 1    Associated Diagnoses: 35 weeks gestation of pregnancy; Nausea and vomiting during pregnancy      oxyCODONE (ROXICODONE) 5 MG immediate release tablet Take 1 tablet (5 mg total) by mouth every 4 (four) hours as needed.  Qty: 20 tablet, Refills: 0    Comments: Quantity prescribed more than 7 day supply? No      PNV/iron/omega3/folic acid/min (PRENAT MV-IRON-[id number]-[id number] ORAL)       progesterone (PROMETRIUM) 200 MG capsule Take 1 capsule (200 mg total) by mouth every evening.  Qty: 30 capsule, Refills: 11    Associated Diagnoses: Positive pregnancy test      promethazine (PHENERGAN) 12.5 MG Tab Take 1 tablet (12.5 mg total) by mouth every 6 (six) hours as needed.  Qty: 30 tablet, Refills: 0    Associated Diagnoses: Nausea/vomiting in pregnancy       !! - Potential duplicate medications found. Please discuss with provider.          Discharge Procedure Orders   Diet Adult Regular     Pelvic Rest   Order Comments: Until cleared by primary OBGYN at follow up visit      Notify your health care provider if you experience any of the following:  temperature >100.4     Notify your health care provider if you experience any of the following:  persistent nausea and vomiting or diarrhea     Notify your health care provider if you experience any of the following:  severe uncontrolled pain     Notify your health care provider if you experience any of the following:  redness, tenderness, or signs of infection (pain, swelling, redness, odor or green/yellow discharge around incision site)     Notify your health care provider if you experience any of the following:  difficulty breathing or increased cough     Notify your health care provider if you experience any of the following:  severe persistent headache     Notify your health care provider if you experience any of the following:  worsening rash     Notify your health care provider if you experience any of the following:  persistent dizziness, light-headedness, or visual disturbances     Notify your health care provider if you experience any of the following:  increased confusion or weakness     Notify your health care provider if you experience any of the following:   Order Comments: Vaginal bleeding saturating more than one pad per hour for >2 hours     No dressing needed     Activity as tolerated       Follow-up Information     Xi Beck MD. Schedule an appointment as soon as possible for a visit in 1 week.    Specialties: Obstetrics, Obstetrics and Gynecology  Why: BP check   Contact information:  [address] [address]  [phone]             Marie Fnag MD. Schedule an appointment as soon as possible for a visit in 2 weeks.    Specialty: Family Medicine  Why: Follow up for instructions on how to increase Pristiq dose  Contact information:  [address]  [place] [address]  [phone]                    Sheila Contreras MD   PGY-1, OB-GYN

## 2020-12-01 ENCOUNTER — POSTPARTUM VISIT (OUTPATIENT)
Dept: OBSTETRICS AND GYNECOLOGY | Facility: CLINIC | Age: 35
End: 2020-12-01
Payer: COMMERCIAL

## 2020-12-01 VITALS
WEIGHT: 173.31 LBS | SYSTOLIC BLOOD PRESSURE: 118 MMHG | DIASTOLIC BLOOD PRESSURE: 80 MMHG | BODY MASS INDEX: 33.84 KG/M2

## 2020-12-01 PROCEDURE — 99214 OFFICE O/P EST MOD 30 MIN: CPT | Mod: 24,S$GLB,, | Performed by: OBSTETRICS & GYNECOLOGY

## 2020-12-01 PROCEDURE — 99214 PR OFFICE/OUTPT VISIT, EST, LEVL IV, 30-39 MIN: ICD-10-PCS | Mod: 24,S$GLB,, | Performed by: OBSTETRICS & GYNECOLOGY

## 2020-12-01 PROCEDURE — 99999 PR PBB SHADOW E&M-EST. PATIENT-LVL III: ICD-10-PCS | Mod: PBBFAC,,, | Performed by: OBSTETRICS & GYNECOLOGY

## 2020-12-01 PROCEDURE — 99999 PR PBB SHADOW E&M-EST. PATIENT-LVL III: CPT | Mod: PBBFAC,,, | Performed by: OBSTETRICS & GYNECOLOGY

## 2020-12-01 NOTE — PROGRESS NOTES
History & Physical  Gynecology      SUBJECTIVE:     Chief Complaint: Postpartum Care (blood pressure check)       History of Present Illness:  Pt is a 34 y/o s/p C/S on 20 here for a BP check.  Pt had C/S for AOdilation.  Pt was readmitted on 20 for postpartum pre-eclampsia after finding out BP elevated at home with HA.  Given 1 dose of procardia PO in the hospital because HR was low.  Did not require any further HTN medication due to normal BP.  Pt given fioricet for headache.  Pt was given MgSO4 on her readmit. Pt was discharged without any BP meds, as her BP was normal at discharge.  Had mild HA at home yestrday morning, releived with tylenol. BP was 143/89.  Denies any SOB, RUQ pain.  Pt has a way to check her BP at home.  Pt reports that her bilateral lower extremity swelling has improved significantly.  Reports urinating multiple liters on day of discharge    Review of patient's allergies indicates:  No Known Allergies    Past Medical History:   Diagnosis Date    Abnormal Pap smear 12    colpo/LEEP    Anemia     Anxiety     PCOS (polycystic ovarian syndrome)      Past Surgical History:   Procedure Laterality Date    CERVICAL BIOPSY  W/ LOOP ELECTRODE EXCISION  10-17-12     SECTION N/A 2020    Procedure:  SECTION;  Surgeon: Karo Zayas MD;  [facility];  Service: OB/GYN;  Laterality: N/A;    GALLBLADDER SURGERY      GASTRIC RESTRICTION SURGERY      gastric sleeve in 2018    WISDOM TOOTH EXTRACTION       OB History        1    Para   1    Term   1            AB        Living   1       SAB        TAB        Ectopic        Multiple   0    Live Births   1               Family History   Problem Relation Age of Onset    Ovarian cancer Paternal Grandmother     Breast cancer Paternal Aunt     Ovarian cancer Paternal Aunt     Breast cancer Paternal Aunt     Uterine cancer Paternal Aunt     Colon cancer Neg Hx      Social History      Tobacco Use    Smoking status: Never Smoker    Smokeless tobacco: Never Used   Substance Use Topics    Alcohol use: Yes     Alcohol/week: 1.0 standard drinks     Types: 1 Glasses of wine per week     Frequency: Never     Drinks per session: 1 or 2     Binge frequency: Never    Drug use: No       Current Outpatient Medications   Medication Sig    acetaminophen (TYLENOL) 325 MG tablet Take 2 tablets (650 mg total) by mouth every 6 (six) hours as needed for Pain.    butalbital-acetaminophen-caffeine -40 mg (FIORICET, ESGIC) -40 mg per tablet Take 1 tablet by mouth every 6 (six) hours as needed for Headaches. Do not exceed 6 tablets in 24 hours.    desvenlafaxine succinate (PRISTIQ) 50 MG Tb24 Take 1 tablet (50 mg total) by mouth once daily.    esomeprazole (NEXIUM) 20 MG capsule Take 20 mg by mouth 2 (two) times daily.    PNV/iron/omega3/folic acid/min (PRENAT MV-IRON-[id number]-[id number] ORAL)     iron,carb/vit C/vit B12/folic (IRON 100 PLUS ORAL)      No current facility-administered medications for this visit.          Review of Systems:  Review of Systems   Constitutional: Negative for activity change, appetite change, chills, fatigue, fever and unexpected weight change.   Respiratory: Negative for cough, shortness of breath and wheezing.    Cardiovascular: Negative for chest pain and leg swelling.   Gastrointestinal: Negative for abdominal pain, constipation, diarrhea, nausea and vomiting.   Endocrine: Negative for hair loss and hot flashes.   Genitourinary: Negative for decreased libido, dyspareunia, dysuria, frequency, menstrual problem, pelvic pain, vaginal bleeding, vaginal discharge and vaginal pain.   Integumentary:  Negative for acne, hair changes, nipple discharge and breast skin changes.   Neurological: Negative for headaches.   Psychiatric/Behavioral: Negative for sleep disturbance.   Breast: Negative for mastodynia, nipple discharge and skin changes       OBJECTIVE:     Physical  Exam:  Physical Exam  Constitutional:       Appearance: She is well-developed.   HENT:      Head: Normocephalic and atraumatic.   Eyes:      General: No scleral icterus.        Right eye: No discharge.         Left eye: No discharge.      Conjunctiva/sclera: Conjunctivae normal.   Pulmonary:      Effort: Pulmonary effort is normal.      Breath sounds: No stridor.   Abdominal:      General: There is no distension.      Palpations: Abdomen is soft.      Tenderness: There is no abdominal tenderness.   Musculoskeletal: Normal range of motion.   Skin:     General: Skin is warm and dry.   Neurological:      Mental Status: She is alert and oriented to person, place, and time.   Psychiatric:         Behavior: Behavior normal.         Thought Content: Thought content normal.         Judgment: Judgment normal.           ASSESSMENT:       ICD-10-CM ICD-9-CM    1. Pre-eclampsia, severe, postpartum condition  O14.15 642.54           Plan:      Darby was seen today for postpartum care.    Diagnoses and all orders for this visit:    Pre-eclampsia, severe, postpartum condition  - Pt normotensive today in clinic (118/80)  - Discussed preeclampsia and how it improves and resolves over time after delivery.  Pt with extensive diuresis when discharged from hospital.  Likely that pt is over the hump and will continue to improve.  Minimal edema on exam  - Discussed precautions and BPs that would be concerning.  Pt given written out guidelines that explain when, based on BP and symptoms, when she needs to call or come in for evaluation.  All questions answered.   - No need for BP medication at this time  - RTC for pp visit.      No orders of the defined types were placed in this encounter.      Follow up in about 6 weeks (around 1/12/2021) for postpartum visit.     Counseling time: 30 minutes    Aisha Hussein

## 2020-12-03 ENCOUNTER — OFFICE VISIT (OUTPATIENT)
Dept: FAMILY MEDICINE | Facility: CLINIC | Age: 35
End: 2020-12-03
Payer: COMMERCIAL

## 2020-12-03 VITALS
HEIGHT: 60 IN | TEMPERATURE: 98 F | DIASTOLIC BLOOD PRESSURE: 80 MMHG | HEART RATE: 77 BPM | BODY MASS INDEX: 33.55 KG/M2 | OXYGEN SATURATION: 96 % | WEIGHT: 170.88 LBS | SYSTOLIC BLOOD PRESSURE: 122 MMHG

## 2020-12-03 PROCEDURE — 1126F AMNT PAIN NOTED NONE PRSNT: CPT | Mod: S$GLB,,, | Performed by: FAMILY MEDICINE

## 2020-12-03 PROCEDURE — 99213 OFFICE O/P EST LOW 20 MIN: CPT | Mod: S$GLB,,, | Performed by: FAMILY MEDICINE

## 2020-12-03 PROCEDURE — 1126F PR PAIN SEVERITY QUANTIFIED, NO PAIN PRESENT: ICD-10-PCS | Mod: S$GLB,,, | Performed by: FAMILY MEDICINE

## 2020-12-03 PROCEDURE — 99999 PR PBB SHADOW E&M-EST. PATIENT-LVL III: CPT | Mod: PBBFAC,,, | Performed by: FAMILY MEDICINE

## 2020-12-03 PROCEDURE — 3008F BODY MASS INDEX DOCD: CPT | Mod: CPTII,S$GLB,, | Performed by: FAMILY MEDICINE

## 2020-12-03 PROCEDURE — 3008F PR BODY MASS INDEX (BMI) DOCUMENTED: ICD-10-PCS | Mod: CPTII,S$GLB,, | Performed by: FAMILY MEDICINE

## 2020-12-03 PROCEDURE — 99213 PR OFFICE/OUTPT VISIT, EST, LEVL III, 20-29 MIN: ICD-10-PCS | Mod: S$GLB,,, | Performed by: FAMILY MEDICINE

## 2020-12-03 PROCEDURE — 99999 PR PBB SHADOW E&M-EST. PATIENT-LVL III: ICD-10-PCS | Mod: PBBFAC,,, | Performed by: FAMILY MEDICINE

## 2020-12-03 NOTE — PROGRESS NOTES
Answers for HPI/ROS submitted by the patient on 12/3/2020   Hypertension  Chronicity: new  Onset: 1 to 4 weeks ago  Progression since onset: gradually improving  Condition status: controlled  anxiety: Yes  blurred vision: No  chest pain: No  headaches: Yes  malaise/fatigue: Yes  neck pain: No  orthopnea: No  palpitations: No  peripheral edema: No  PND: No  shortness of breath: No  sweats: No  Agents associated with hypertension: decongestants, NSAIDs  CAD risks: no known risk factors  Compliance problems: no compliance problems  Past treatments: nothing  Improvement on treatment: no improvement

## 2020-12-03 NOTE — PROGRESS NOTES
Subjective:       Patient ID: Lauren K Damico is a 35 y.o. female.    Chief Complaint: Follow Up/ Anxiety    35-year-old female presents for follow-up.  She was admitted or postpartum eclampsia.  She states her blood pressure was 197/111.  She states since hospital this she has not been on any blood pressure medication has been less than 140 systolic.  She also states she has been feeling overwhelmed and crying.  She states that experiences very traumatic for her and she is worried about her mood.  She states she does has no desire to do anything and states that she lost her child, but misses her life before this.  She was started on Pristiq, but this was only a week and a half ago.  She does have support of her  as well as her mother and mother-in-law.    Review of Systems   Respiratory: Negative for shortness of breath.    Cardiovascular: Negative for chest pain and palpitations.   Musculoskeletal: Negative for neck pain.   Neurological: Positive for headaches.         Objective:       Vitals:    12/03/20 1038   BP: 122/80   Pulse: 77   Temp: 98.4 °F (36.9 °C)   TempSrc: Oral   SpO2: 96%   Weight: 77.5 kg (170 lb 13.7 oz)   Height: 5' (1.524 m)       Physical Exam  Constitutional:       General: She is not in acute distress.     Appearance: Normal appearance. She is not ill-appearing, toxic-appearing or diaphoretic.   Neck:      Musculoskeletal: Normal range of motion and neck supple.   Neurological:      Mental Status: She is alert.   Psychiatric:         Mood and Affect: Mood is depressed.         Speech: Speech normal.         Behavior: Behavior normal.         Cognition and Memory: Cognition normal.         Assessment:       1. Postpartum depression    2. Postpartum eclampsia        Plan:       Darby was seen today for follow up/ anxiety.    Diagnoses and all orders for this visit:    Postpartum depression  Patient is to starting on Pristiq.  She can follow-up with me within 1 month.  Given  reassurances these symptoms she is having are not unusual, but symptoms of baby blues.    Postpartum eclampsia  Blood pressure is controlled.  Advised to get plenty of rest, make sure she has a well-balanced diet.  She is no longer breastfeeding.

## 2021-01-05 ENCOUNTER — RESEARCH ENCOUNTER (OUTPATIENT)
Dept: RESEARCH | Facility: HOSPITAL | Age: 36
End: 2021-01-05

## 2021-01-07 ENCOUNTER — POSTPARTUM VISIT (OUTPATIENT)
Dept: OBSTETRICS AND GYNECOLOGY | Facility: CLINIC | Age: 36
End: 2021-01-07
Payer: COMMERCIAL

## 2021-01-07 VITALS
SYSTOLIC BLOOD PRESSURE: 126 MMHG | HEIGHT: 60 IN | WEIGHT: 170 LBS | BODY MASS INDEX: 33.38 KG/M2 | DIASTOLIC BLOOD PRESSURE: 78 MMHG

## 2021-01-07 DIAGNOSIS — Z30.9 ENCOUNTER FOR CONTRACEPTIVE MANAGEMENT, UNSPECIFIED TYPE: ICD-10-CM

## 2021-01-07 PROCEDURE — 99999 PR PBB SHADOW E&M-EST. PATIENT-LVL III: ICD-10-PCS | Mod: PBBFAC,,, | Performed by: OBSTETRICS & GYNECOLOGY

## 2021-01-07 PROCEDURE — 88175 CYTOPATH C/V AUTO FLUID REDO: CPT

## 2021-01-07 PROCEDURE — 0503F PR POSTPARTUM CARE VISIT: ICD-10-PCS | Mod: S$GLB,,, | Performed by: OBSTETRICS & GYNECOLOGY

## 2021-01-07 PROCEDURE — 0503F POSTPARTUM CARE VISIT: CPT | Mod: S$GLB,,, | Performed by: OBSTETRICS & GYNECOLOGY

## 2021-01-07 PROCEDURE — 99999 PR PBB SHADOW E&M-EST. PATIENT-LVL III: CPT | Mod: PBBFAC,,, | Performed by: OBSTETRICS & GYNECOLOGY

## 2021-01-07 RX ORDER — LACTIC ACID, L-, CITRIC ACID MONOHYDRATE, AND POTASSIUM BITARTRATE 90; 50; 20 MG/5G; MG/5G; MG/5G
1 GEL VAGINAL
Qty: 60 G | Refills: 12 | Status: SHIPPED | OUTPATIENT
Start: 2021-01-07 | End: 2022-10-23

## 2021-01-18 ENCOUNTER — PATIENT MESSAGE (OUTPATIENT)
Dept: FAMILY MEDICINE | Facility: CLINIC | Age: 36
End: 2021-01-18

## 2021-01-19 RX ORDER — ALPRAZOLAM 0.5 MG/1
0.5 TABLET ORAL NIGHTLY
Qty: 30 TABLET | Refills: 2 | Status: SHIPPED | OUTPATIENT
Start: 2021-01-19 | End: 2021-11-01 | Stop reason: SDUPTHER

## 2021-01-19 RX ORDER — DESVENLAFAXINE SUCCINATE 50 MG/1
50 TABLET, EXTENDED RELEASE ORAL DAILY
Qty: 30 TABLET | Refills: 5 | Status: SHIPPED | OUTPATIENT
Start: 2021-01-19 | End: 2021-07-29

## 2021-01-22 ENCOUNTER — PATIENT MESSAGE (OUTPATIENT)
Dept: ADMINISTRATIVE | Facility: OTHER | Age: 36
End: 2021-01-22

## 2021-02-13 ENCOUNTER — IMMUNIZATION (OUTPATIENT)
Dept: PHARMACY | Facility: CLINIC | Age: 36
End: 2021-02-13
Payer: COMMERCIAL

## 2021-02-13 DIAGNOSIS — Z23 NEED FOR VACCINATION: Primary | ICD-10-CM

## 2021-02-13 LAB
FINAL PATHOLOGIC DIAGNOSIS: NORMAL
Lab: NORMAL

## 2021-03-13 ENCOUNTER — IMMUNIZATION (OUTPATIENT)
Dept: PHARMACY | Facility: CLINIC | Age: 36
End: 2021-03-13
Payer: COMMERCIAL

## 2021-03-13 DIAGNOSIS — Z23 NEED FOR VACCINATION: Primary | ICD-10-CM

## 2021-04-22 ENCOUNTER — PATIENT MESSAGE (OUTPATIENT)
Dept: UROLOGY | Facility: CLINIC | Age: 36
End: 2021-04-22

## 2021-06-03 ENCOUNTER — PATIENT MESSAGE (OUTPATIENT)
Dept: OBSTETRICS AND GYNECOLOGY | Facility: CLINIC | Age: 36
End: 2021-06-03

## 2021-06-03 ENCOUNTER — PATIENT MESSAGE (OUTPATIENT)
Dept: HEMATOLOGY/ONCOLOGY | Facility: CLINIC | Age: 36
End: 2021-06-03

## 2021-06-04 DIAGNOSIS — D50.8 IRON DEFICIENCY ANEMIA SECONDARY TO INADEQUATE DIETARY IRON INTAKE: Primary | ICD-10-CM

## 2021-06-07 ENCOUNTER — OFFICE VISIT (OUTPATIENT)
Dept: HEMATOLOGY/ONCOLOGY | Facility: CLINIC | Age: 36
End: 2021-06-07
Payer: COMMERCIAL

## 2021-06-07 ENCOUNTER — LAB VISIT (OUTPATIENT)
Dept: LAB | Facility: HOSPITAL | Age: 36
End: 2021-06-07
Attending: INTERNAL MEDICINE
Payer: COMMERCIAL

## 2021-06-07 VITALS
DIASTOLIC BLOOD PRESSURE: 69 MMHG | RESPIRATION RATE: 18 BRPM | HEART RATE: 76 BPM | SYSTOLIC BLOOD PRESSURE: 115 MMHG | TEMPERATURE: 99 F | HEIGHT: 60 IN | BODY MASS INDEX: 36.27 KG/M2 | WEIGHT: 184.75 LBS | OXYGEN SATURATION: 98 %

## 2021-06-07 DIAGNOSIS — D50.8 IRON DEFICIENCY ANEMIA SECONDARY TO INADEQUATE DIETARY IRON INTAKE: ICD-10-CM

## 2021-06-07 DIAGNOSIS — R59.1 LYMPHADENOPATHY: Primary | ICD-10-CM

## 2021-06-07 DIAGNOSIS — Z98.84 H/O BARIATRIC SURGERY: ICD-10-CM

## 2021-06-07 LAB
ALBUMIN SERPL BCP-MCNC: 3.8 G/DL (ref 3.5–5.2)
ALP SERPL-CCNC: 71 U/L (ref 55–135)
ALT SERPL W/O P-5'-P-CCNC: 19 U/L (ref 10–44)
ANION GAP SERPL CALC-SCNC: 11 MMOL/L (ref 8–16)
AST SERPL-CCNC: 14 U/L (ref 10–40)
BASOPHILS # BLD AUTO: 0.04 K/UL (ref 0–0.2)
BASOPHILS NFR BLD: 0.4 % (ref 0–1.9)
BILIRUB SERPL-MCNC: 0.3 MG/DL (ref 0.1–1)
BUN SERPL-MCNC: 12 MG/DL (ref 6–20)
CALCIUM SERPL-MCNC: 9.5 MG/DL (ref 8.7–10.5)
CHLORIDE SERPL-SCNC: 107 MMOL/L (ref 95–110)
CO2 SERPL-SCNC: 21 MMOL/L (ref 23–29)
CREAT SERPL-MCNC: 0.8 MG/DL (ref 0.5–1.4)
DIFFERENTIAL METHOD: NORMAL
EOSINOPHIL # BLD AUTO: 0.1 K/UL (ref 0–0.5)
EOSINOPHIL NFR BLD: 1.1 % (ref 0–8)
ERYTHROCYTE [DISTWIDTH] IN BLOOD BY AUTOMATED COUNT: 13 % (ref 11.5–14.5)
ERYTHROCYTE [SEDIMENTATION RATE] IN BLOOD BY WESTERGREN METHOD: 16 MM/HR (ref 0–36)
EST. GFR  (AFRICAN AMERICAN): >60 ML/MIN/1.73 M^2
EST. GFR  (NON AFRICAN AMERICAN): >60 ML/MIN/1.73 M^2
FERRITIN SERPL-MCNC: 25 NG/ML (ref 20–300)
GLUCOSE SERPL-MCNC: 97 MG/DL (ref 70–110)
HCT VFR BLD AUTO: 38 % (ref 37–48.5)
HGB BLD-MCNC: 12.8 G/DL (ref 12–16)
IMM GRANULOCYTES # BLD AUTO: 0.02 K/UL (ref 0–0.04)
IMM GRANULOCYTES NFR BLD AUTO: 0.2 % (ref 0–0.5)
IRON SERPL-MCNC: 74 UG/DL (ref 30–160)
LDH SERPL L TO P-CCNC: 147 U/L (ref 110–260)
LYMPHOCYTES # BLD AUTO: 2.3 K/UL (ref 1–4.8)
LYMPHOCYTES NFR BLD: 24.7 % (ref 18–48)
MCH RBC QN AUTO: 29.4 PG (ref 27–31)
MCHC RBC AUTO-ENTMCNC: 33.7 G/DL (ref 32–36)
MCV RBC AUTO: 87 FL (ref 82–98)
MONOCYTES # BLD AUTO: 0.6 K/UL (ref 0.3–1)
MONOCYTES NFR BLD: 6.3 % (ref 4–15)
NEUTROPHILS # BLD AUTO: 6.3 K/UL (ref 1.8–7.7)
NEUTROPHILS NFR BLD: 67.3 % (ref 38–73)
NRBC BLD-RTO: 0 /100 WBC
PLATELET # BLD AUTO: 331 K/UL (ref 150–450)
PMV BLD AUTO: 9.2 FL (ref 9.2–12.9)
POTASSIUM SERPL-SCNC: 4.1 MMOL/L (ref 3.5–5.1)
PROT SERPL-MCNC: 7.4 G/DL (ref 6–8.4)
RBC # BLD AUTO: 4.36 M/UL (ref 4–5.4)
SATURATED IRON: 21 % (ref 20–50)
SODIUM SERPL-SCNC: 139 MMOL/L (ref 136–145)
TOTAL IRON BINDING CAPACITY: 351 UG/DL (ref 250–450)
TRANSFERRIN SERPL-MCNC: 237 MG/DL (ref 200–375)
URATE SERPL-MCNC: 3.9 MG/DL (ref 2.4–5.7)
WBC # BLD AUTO: 9.3 K/UL (ref 3.9–12.7)

## 2021-06-07 PROCEDURE — 85652 RBC SED RATE AUTOMATED: CPT | Performed by: INTERNAL MEDICINE

## 2021-06-07 PROCEDURE — 83615 LACTATE (LD) (LDH) ENZYME: CPT | Performed by: INTERNAL MEDICINE

## 2021-06-07 PROCEDURE — 99215 PR OFFICE/OUTPT VISIT, EST, LEVL V, 40-54 MIN: ICD-10-PCS | Mod: S$GLB,,, | Performed by: INTERNAL MEDICINE

## 2021-06-07 PROCEDURE — 3008F PR BODY MASS INDEX (BMI) DOCUMENTED: ICD-10-PCS | Mod: CPTII,S$GLB,, | Performed by: INTERNAL MEDICINE

## 2021-06-07 PROCEDURE — 99999 PR PBB SHADOW E&M-EST. PATIENT-LVL III: CPT | Mod: PBBFAC,,, | Performed by: INTERNAL MEDICINE

## 2021-06-07 PROCEDURE — 83540 ASSAY OF IRON: CPT | Performed by: INTERNAL MEDICINE

## 2021-06-07 PROCEDURE — 1126F AMNT PAIN NOTED NONE PRSNT: CPT | Mod: S$GLB,,, | Performed by: INTERNAL MEDICINE

## 2021-06-07 PROCEDURE — 85025 COMPLETE CBC W/AUTO DIFF WBC: CPT | Performed by: INTERNAL MEDICINE

## 2021-06-07 PROCEDURE — 99215 OFFICE O/P EST HI 40 MIN: CPT | Mod: S$GLB,,, | Performed by: INTERNAL MEDICINE

## 2021-06-07 PROCEDURE — 36415 COLL VENOUS BLD VENIPUNCTURE: CPT | Performed by: INTERNAL MEDICINE

## 2021-06-07 PROCEDURE — 84550 ASSAY OF BLOOD/URIC ACID: CPT | Performed by: INTERNAL MEDICINE

## 2021-06-07 PROCEDURE — 3008F BODY MASS INDEX DOCD: CPT | Mod: CPTII,S$GLB,, | Performed by: INTERNAL MEDICINE

## 2021-06-07 PROCEDURE — 82728 ASSAY OF FERRITIN: CPT | Performed by: INTERNAL MEDICINE

## 2021-06-07 PROCEDURE — 80053 COMPREHEN METABOLIC PANEL: CPT | Performed by: INTERNAL MEDICINE

## 2021-06-07 PROCEDURE — 1126F PR PAIN SEVERITY QUANTIFIED, NO PAIN PRESENT: ICD-10-PCS | Mod: S$GLB,,, | Performed by: INTERNAL MEDICINE

## 2021-06-07 PROCEDURE — 99999 PR PBB SHADOW E&M-EST. PATIENT-LVL III: ICD-10-PCS | Mod: PBBFAC,,, | Performed by: INTERNAL MEDICINE

## 2021-06-08 PROBLEM — R59.1 LYMPHADENOPATHY: Status: ACTIVE | Noted: 2021-06-08

## 2021-06-09 DIAGNOSIS — D50.8 IRON DEFICIENCY ANEMIA SECONDARY TO INADEQUATE DIETARY IRON INTAKE: Primary | ICD-10-CM

## 2021-06-22 ENCOUNTER — HOSPITAL ENCOUNTER (OUTPATIENT)
Dept: RADIOLOGY | Facility: HOSPITAL | Age: 36
Discharge: HOME | End: 2021-06-22
Attending: INTERNAL MEDICINE
Payer: COMMERCIAL

## 2021-06-22 DIAGNOSIS — R59.1 LYMPHADENOPATHY: ICD-10-CM

## 2021-06-22 LAB — POCT GLUCOSE: 99 MG/DL (ref 70–110)

## 2021-06-22 PROCEDURE — 25500020 PHARM REV CODE 255: Performed by: INTERNAL MEDICINE

## 2021-06-22 PROCEDURE — 78815 PET IMAGE W/CT SKULL-THIGH: CPT | Mod: TC

## 2021-06-22 PROCEDURE — 78815 NM PET CT ROUTINE: ICD-10-PCS | Mod: 26,PS,, | Performed by: RADIOLOGY

## 2021-06-22 PROCEDURE — 78815 PET IMAGE W/CT SKULL-THIGH: CPT | Mod: 26,PS,, | Performed by: RADIOLOGY

## 2021-06-22 PROCEDURE — A9698 NON-RAD CONTRAST MATERIALNOC: HCPCS | Performed by: INTERNAL MEDICINE

## 2021-06-22 RX ADMIN — IOHEXOL 1000 ML: 9 SOLUTION ORAL at 01:06

## 2021-09-03 ENCOUNTER — TELEPHONE (OUTPATIENT)
Dept: HEMATOLOGY/ONCOLOGY | Facility: CLINIC | Age: 36
End: 2021-09-03

## 2021-11-01 PROBLEM — Z3A.37 37 WEEKS GESTATION OF PREGNANCY: Status: RESOLVED | Noted: 2020-11-19 | Resolved: 2021-11-01

## 2021-11-29 ENCOUNTER — OFFICE VISIT (OUTPATIENT)
Dept: FAMILY MEDICINE | Facility: CLINIC | Age: 36
End: 2021-11-29
Payer: COMMERCIAL

## 2021-11-29 VITALS
SYSTOLIC BLOOD PRESSURE: 118 MMHG | TEMPERATURE: 98 F | WEIGHT: 191.81 LBS | OXYGEN SATURATION: 96 % | RESPIRATION RATE: 18 BRPM | BODY MASS INDEX: 37.66 KG/M2 | HEIGHT: 60 IN | HEART RATE: 78 BPM | DIASTOLIC BLOOD PRESSURE: 84 MMHG

## 2021-11-29 DIAGNOSIS — Z00.00 ANNUAL PHYSICAL EXAM: Primary | ICD-10-CM

## 2021-11-29 DIAGNOSIS — F39 MOOD DISORDER: ICD-10-CM

## 2021-11-29 PROCEDURE — 99395 PREV VISIT EST AGE 18-39: CPT | Mod: S$GLB,,, | Performed by: FAMILY MEDICINE

## 2021-11-29 PROCEDURE — 99999 PR PBB SHADOW E&M-EST. PATIENT-LVL II: CPT | Mod: PBBFAC,,, | Performed by: FAMILY MEDICINE

## 2021-11-29 PROCEDURE — 99999 PR PBB SHADOW E&M-EST. PATIENT-LVL II: ICD-10-PCS | Mod: PBBFAC,,, | Performed by: FAMILY MEDICINE

## 2021-11-29 PROCEDURE — 99395 PR PREVENTIVE VISIT,EST,18-39: ICD-10-PCS | Mod: S$GLB,,, | Performed by: FAMILY MEDICINE

## 2021-11-29 RX ORDER — DESVENLAFAXINE 100 MG/1
100 TABLET, EXTENDED RELEASE ORAL DAILY
Qty: 30 TABLET | Refills: 11 | Status: SHIPPED | OUTPATIENT
Start: 2021-11-29 | End: 2022-11-23

## 2021-11-30 RX ORDER — ALPRAZOLAM 0.5 MG/1
0.5 TABLET ORAL NIGHTLY
Qty: 10 TABLET | Refills: 0 | Status: SHIPPED | OUTPATIENT
Start: 2021-11-30 | End: 2023-02-15

## 2022-04-13 NOTE — TELEPHONE ENCOUNTER
Care Due:                  Date            Visit Type   Department     Provider  --------------------------------------------------------------------------------                                Y[facility]      [facility]/OF  MEDICINE/INTERN  Last Visit: 11-      FICE VISIT   AL [facility]         Marie Montez  Next Visit: None Scheduled  None         None Found                                                            Last  Test          Frequency    Reason                     Performed    Due Date  --------------------------------------------------------------------------------    Cr..........  12 months..  desvenlafaxine...........  06- 06-    Powered by [employer] by [employer]. Reference number: [id number].   4/13/2022 12:42:56 PM CDT

## 2022-04-13 NOTE — TELEPHONE ENCOUNTER
Last Office Visit Info:   The patient's last visit with Marie Fang MD was on 11/29/2021.    The patient's last visit in current department was on 11/29/2021.        Last CBC Results:   Lab Results   Component Value Date    WBC 9.30 06/07/2021    HGB 12.8 06/07/2021    HCT 38.0 06/07/2021     06/07/2021       Last CMP Results  Lab Results   Component Value Date     06/07/2021    K 4.1 06/07/2021     06/07/2021    CO2 21 (L) 06/07/2021    BUN 12 06/07/2021    CREATININE 0.8 06/07/2021    CALCIUM 9.5 06/07/2021    ALBUMIN 3.8 06/07/2021    AST 14 06/07/2021    ALT 19 06/07/2021       Last Lipids  Lab Results   Component Value Date    CHOL 181 10/04/2017    TRIG 87 10/04/2017    HDL 40 10/04/2017    LDLCALC 123.6 10/04/2017       Last A1C  Lab Results   Component Value Date    HGBA1C 5.4 11/19/2019       Last TSH  Lab Results   Component Value Date    TSH 1.266 11/19/2019             Current Med Refills  Medication List with Changes/Refills   Current Medications    ACETAMINOPHEN (TYLENOL) 325 MG TABLET    Take 2 tablets (650 mg total) by mouth every 6 (six) hours as needed for Pain.       Start Date: 11/20/2020End Date: --    ALPRAZOLAM (XANAX) 0.5 MG TABLET    Take 1 tablet (0.5 mg total) by mouth every evening.       Start Date: 11/30/2021End Date: --    DESVENLAFAXINE SUCCINATE (PRISTIQ) 100 MG TB24    Take 1 tablet (100 mg total) by mouth once daily.       Start Date: 11/29/2021End Date: 11/29/2022    ESOMEPRAZOLE (NEXIUM) 20 MG CAPSULE    Take 20 mg by mouth 2 (two) times daily.       Start Date: 10/23/2018End Date: --    IRON,CARB/VIT C/VIT B12/FOLIC (IRON 100 PLUS ORAL)           Start Date: 6/23/2020 End Date: --    LACTIC ACID-CITRIC-POTASSIUM (PHEXXI) 1.8-1-0.4 % GEL    Place 1 application vaginally as needed.       Start Date: 1/7/2021  End Date: --    PNV/IRON/OMEGA3/FOLIC ACID/MIN (PRENAT MV-IRON-CA-TK-QH8-JATCM ORAL)           Start Date: 4/1/2020  End Date: --

## 2022-04-14 RX ORDER — ALPRAZOLAM 0.5 MG/1
0.5 TABLET ORAL NIGHTLY
Qty: 10 TABLET | Refills: 0 | OUTPATIENT
Start: 2022-04-14

## 2022-04-19 ENCOUNTER — PATIENT MESSAGE (OUTPATIENT)
Dept: FAMILY MEDICINE | Facility: CLINIC | Age: 37
End: 2022-04-19
Payer: COMMERCIAL

## 2022-04-20 NOTE — ADDENDUM NOTE
Addended by: GUI DOWNS on: 10/20/2020 10:57 AM     Modules accepted: Orders     Renny Mendez [address]? no    Depression Screening:  3 most recent PHQ Screens 4/20/

## 2022-07-12 ENCOUNTER — OFFICE VISIT (OUTPATIENT)
Dept: SPORTS MEDICINE | Facility: CLINIC | Age: 37
End: 2022-07-12
Payer: COMMERCIAL

## 2022-07-12 VITALS
HEART RATE: 83 BPM | WEIGHT: 200.19 LBS | DIASTOLIC BLOOD PRESSURE: 74 MMHG | HEIGHT: 60 IN | SYSTOLIC BLOOD PRESSURE: 113 MMHG | BODY MASS INDEX: 39.3 KG/M2

## 2022-07-12 DIAGNOSIS — M99.08 SOMATIC DYSFUNCTION OF RIB CAGE REGION: ICD-10-CM

## 2022-07-12 DIAGNOSIS — M99.00 SOMATIC DYSFUNCTION OF HEAD REGION: ICD-10-CM

## 2022-07-12 DIAGNOSIS — G44.229 CHRONIC TENSION-TYPE HEADACHE, NOT INTRACTABLE: Primary | ICD-10-CM

## 2022-07-12 DIAGNOSIS — M99.07 UPPER EXTREMITY SOMATIC DYSFUNCTION: ICD-10-CM

## 2022-07-12 DIAGNOSIS — R29.3 POOR POSTURE: ICD-10-CM

## 2022-07-12 DIAGNOSIS — M99.01 CERVICAL (NECK) REGION SOMATIC DYSFUNCTION: ICD-10-CM

## 2022-07-12 DIAGNOSIS — M26.623 TMJ TENDERNESS, BILATERAL: ICD-10-CM

## 2022-07-12 DIAGNOSIS — M99.03 SOMATIC DYSFUNCTION OF LUMBAR REGION: ICD-10-CM

## 2022-07-12 DIAGNOSIS — M79.10 MYALGIA: ICD-10-CM

## 2022-07-12 DIAGNOSIS — M54.2 CERVICALGIA: ICD-10-CM

## 2022-07-12 DIAGNOSIS — M99.02 SOMATIC DYSFUNCTION OF THORACIC REGION: ICD-10-CM

## 2022-07-12 PROCEDURE — 1159F MED LIST DOCD IN RCRD: CPT | Mod: CPTII,S$GLB,, | Performed by: NEUROMUSCULOSKELETAL MEDICINE & OMM

## 2022-07-12 PROCEDURE — 3074F SYST BP LT 130 MM HG: CPT | Mod: CPTII,S$GLB,, | Performed by: NEUROMUSCULOSKELETAL MEDICINE & OMM

## 2022-07-12 PROCEDURE — 3008F PR BODY MASS INDEX (BMI) DOCUMENTED: ICD-10-PCS | Mod: CPTII,S$GLB,, | Performed by: NEUROMUSCULOSKELETAL MEDICINE & OMM

## 2022-07-12 PROCEDURE — 99203 OFFICE O/P NEW LOW 30 MIN: CPT | Mod: 25,S$GLB,, | Performed by: NEUROMUSCULOSKELETAL MEDICINE & OMM

## 2022-07-12 PROCEDURE — 98927 PR OSTEOPATHIC MANIP,5-6 BODY REGN: ICD-10-PCS | Mod: S$GLB,,, | Performed by: NEUROMUSCULOSKELETAL MEDICINE & OMM

## 2022-07-12 PROCEDURE — 99999 PR PBB SHADOW E&M-EST. PATIENT-LVL III: CPT | Mod: PBBFAC,,, | Performed by: NEUROMUSCULOSKELETAL MEDICINE & OMM

## 2022-07-12 PROCEDURE — 98927 OSTEOPATH MANJ 5-6 REGIONS: CPT | Mod: S$GLB,,, | Performed by: NEUROMUSCULOSKELETAL MEDICINE & OMM

## 2022-07-12 PROCEDURE — 3008F BODY MASS INDEX DOCD: CPT | Mod: CPTII,S$GLB,, | Performed by: NEUROMUSCULOSKELETAL MEDICINE & OMM

## 2022-07-12 PROCEDURE — 1159F PR MEDICATION LIST DOCUMENTED IN MEDICAL RECORD: ICD-10-PCS | Mod: CPTII,S$GLB,, | Performed by: NEUROMUSCULOSKELETAL MEDICINE & OMM

## 2022-07-12 PROCEDURE — 97110 PR THERAPEUTIC EXERCISES: ICD-10-PCS | Mod: S$GLB,,, | Performed by: NEUROMUSCULOSKELETAL MEDICINE & OMM

## 2022-07-12 PROCEDURE — 97110 THERAPEUTIC EXERCISES: CPT | Mod: S$GLB,,, | Performed by: NEUROMUSCULOSKELETAL MEDICINE & OMM

## 2022-07-12 PROCEDURE — 1160F PR REVIEW ALL MEDS BY PRESCRIBER/CLIN PHARMACIST DOCUMENTED: ICD-10-PCS | Mod: CPTII,S$GLB,, | Performed by: NEUROMUSCULOSKELETAL MEDICINE & OMM

## 2022-07-12 PROCEDURE — 1160F RVW MEDS BY RX/DR IN RCRD: CPT | Mod: CPTII,S$GLB,, | Performed by: NEUROMUSCULOSKELETAL MEDICINE & OMM

## 2022-07-12 PROCEDURE — 99203 PR OFFICE/OUTPT VISIT, NEW, LEVL III, 30-44 MIN: ICD-10-PCS | Mod: 25,S$GLB,, | Performed by: NEUROMUSCULOSKELETAL MEDICINE & OMM

## 2022-07-12 PROCEDURE — 99999 PR PBB SHADOW E&M-EST. PATIENT-LVL III: ICD-10-PCS | Mod: PBBFAC,,, | Performed by: NEUROMUSCULOSKELETAL MEDICINE & OMM

## 2022-07-12 PROCEDURE — 3078F PR MOST RECENT DIASTOLIC BLOOD PRESSURE < 80 MM HG: ICD-10-PCS | Mod: CPTII,S$GLB,, | Performed by: NEUROMUSCULOSKELETAL MEDICINE & OMM

## 2022-07-12 PROCEDURE — 3078F DIAST BP <80 MM HG: CPT | Mod: CPTII,S$GLB,, | Performed by: NEUROMUSCULOSKELETAL MEDICINE & OMM

## 2022-07-12 PROCEDURE — 3074F PR MOST RECENT SYSTOLIC BLOOD PRESSURE < 130 MM HG: ICD-10-PCS | Mod: CPTII,S$GLB,, | Performed by: NEUROMUSCULOSKELETAL MEDICINE & OMM

## 2022-07-12 NOTE — PROGRESS NOTES
Subjective:     Lauren K Damico    Chief Complaint   Patient presents with    Jaw - Pain       HPI    Darby is coming in today for jaw and headache pain that began about 6-7 month(s) ago. Pt. describes the pain as a 7/10 achy pain that does not radiate. There was not a fall/injury/ or trauma associated with the onset of symptoms. The pain is better with Excedrin migraine, rubbing jaw, heated pillow and worse with standing, leaning over, lifting, first thing in the morning. Pt works in the [employer] and gets headaches/jaw pain with this. Does c/o popping in her jaw. Does have headache first thing in the am. Does feel like sleep is restful. Had a normal sleep study about 3 years ago. Mother to a 20 month old.  Patient does not currently wear a bite guard. Pt. Denies any other musculoskeletal complaints at this time. Notes her sister has had a dislocated jaw twice that required relocation in the ED.     Review of Systems   Constitutional: Negative for chills and fever.   Musculoskeletal: Positive for myalgias and neck pain. Negative for back pain, falls and joint pain.   Neurological: Positive for headaches. Negative for dizziness, tingling, focal weakness and weakness.     PAST MEDICAL HISTORY:   Past Medical History:   Diagnosis Date    Abnormal Pap smear 12    colpo/LEEP    Anemia     Anxiety     PCOS (polycystic ovarian syndrome)      PAST SURGICAL HISTORY:   Past Surgical History:   Procedure Laterality Date    CERVICAL BIOPSY  W/ LOOP ELECTRODE EXCISION  10-17-12     SECTION N/A 2020    Procedure:  SECTION;  Surgeon: Karo Zayas MD;  Location: [facility];  Service: OB/GYN;  Laterality: N/A;    GALLBLADDER SURGERY      GASTRIC RESTRICTION SURGERY      gastric sleeve in 2018    WISDOM TOOTH EXTRACTION       FAMILY HISTORY:   Family History   Problem Relation Age of Onset    Ovarian cancer Paternal Grandmother     Breast cancer Paternal Aunt     Ovarian cancer  Paternal Aunt     Breast cancer Paternal Aunt     Uterine cancer Paternal Aunt     Colon cancer Neg Hx      SOCIAL HISTORY:   Social History     Socioeconomic History    Marital status:    Tobacco Use    Smoking status: Never Smoker    Smokeless tobacco: Never Used   Substance and Sexual Activity    Alcohol use: Yes     Alcohol/week: 1.0 standard drink     Types: 1 Glasses of wine per week    Drug use: No    Sexual activity: Yes     Partners: Male     Birth control/protection: None       MEDICATIONS:   Current Outpatient Medications:     acetaminophen (TYLENOL) 325 MG tablet, Take 2 tablets (650 mg total) by mouth every 6 (six) hours as needed for Pain., Disp: 30 tablet, Rfl: 0    ALPRAZolam (XANAX) 0.5 MG tablet, Take 1 tablet (0.5 mg total) by mouth every evening., Disp: 10 tablet, Rfl: 0    desvenlafaxine succinate (PRISTIQ) 100 MG Tb24, Take 1 tablet (100 mg total) by mouth once daily., Disp: 30 tablet, Rfl: 11    esomeprazole (NEXIUM) 20 MG capsule, Take 20 mg by mouth 2 (two) times daily., Disp: , Rfl: 0    iron,carb/vit C/vit B12/folic (IRON 100 PLUS ORAL), , Disp: , Rfl:     lactic acid-citric-potassium (PHEXXI) 1.8-1-0.4 % Gel, Place 1 application vaginally as needed., Disp: 60 g, Rfl: 12    PNV/iron/omega3/folic acid/min (PRENAT MV-IRON-CA-EG-HE8-MAZBH ORAL), , Disp: , Rfl:   ALLERGIES: Review of patient's allergies indicates:  No Known Allergies      Objective:     VITAL SIGNS: /74   Pulse 83   Ht 5' (1.524 m)   Wt 90.8 kg (200 lb 3.2 oz)   BMI 39.10 kg/m²    General    Nursing note and vitals reviewed.  Constitutional: She is oriented to person, place, and time. She appears well-developed and well-nourished.   HENT:   Head: Normocephalic and atraumatic.   no nasal discharge, no external ear redness or discharge   Eyes:   EOM is full and smooth  No eye redness or discharge   Neck: Neck supple. No tracheal deviation present.   Cardiovascular: Normal rate.    2+ Radial pulse  bilaterally  2+ Dorsalis Pedis pulse bilaterally  No LE edema appreciated   Pulmonary/Chest: Effort normal. No respiratory distress.   Abdominal: She exhibits no distension.   No rigidity   Neurological: She is alert and oriented to person, place, and time. She exhibits normal muscle tone. Coordination normal.   See details below   Psychiatric: She has a normal mood and affect. Her behavior is normal.                 MUSCULOSKELETAL EXAM:     Cervical Spine: bilateral cervical region    Observation:    Posture:  Anterior head carriage and Posterior pelvis tilt with loss of lumbar lordosis  No obvious shoulder un-leveling while standing.    No edema, erythema, or ecchymosis noted in cervical and thoraic spine regions.    No midline skin abnormalities.    No atrophy of upper limb musculature.  Gait: Non-antalgic     Tenderness:  No tenderness throughout the cervical spine upon spinous process palpation  No tenderness over the base of the occiput  No bony deformities or step-offs palpated.   + trigger point tenderness of the upper trapezius musculature bilaterally  + TMJ tenderness bilaterally    Range of Motion (* = with pain):  CERVICAL SPINE  Full AROM in flexion, extension, sidebending, and rotation     SHOULDER  Active flexion to 180° on left and 180° on right.  Active abduction to 180° on left and 180° on right.  Active internal rotation to T7 on left and T7 on right.    Active external rotation to T4 on left and T7 on right.    No scapular dyskinesia or winging.    Strength Testing (* = with pain):  Sternocleidomastoid - 5/5 on left and 5/5 on right  Upper trapezius-  5/5 on left and 5/5 on right  Deltoid - 5/5 on left and 5/5 on right  Biceps - 5/5 on left and 5/5 on right  Triceps - 5/5 on left and 5/5 on right  Wrist extension - 5/5 on left and 5/5 on right  Wrist flexion - 5/5 on left and 5/5 on right   - 5/5 on left and 5/5 on right  Finger extension - 5/5 on left and 5/5 on right  Finger abduction -  5/5 on left and 5/5 on right    Structural Exam:  TART (Tissue texture abnormality, Asymmetry,  Restriction of motion and/or Tenderness) changes:    Head:   Cranial Rhythmic Impulse (CRI): restricted with Decreased amplitude    Strain Patterns: Left lateral strain with jaw occlusion  Occipitoatlantal (OA) Joint: ES-left, R-right  Suture/Bone Restriction Side   Occipitomastoid (OM)  Present Bilateral   Parietal mastoid (PM) Absent    Petrojugular (PJ) Absent    Sphenosquamous pivot (SSP) Absent    Zygomaticotemporal (ZT) Absent    Zygomaticofrontal (ZF) Absent    Frontonasal Absent    [place] bone Absent    Parietal bone Absent    Frontal bone Absent      Muscle Tissue Texture Abnormality Side   Lateral pterygoid Present Bilateral   Medial pterygoid Present Bilateral        Cervical Spine   C1 TTA RIGHT   C2 FRS LEFT   C3 FRS LEFT   C4 FRS LEFT   C5 Neutral   C6 Neutral   C7 ERS LEFT      Thoracic Spine   T1 FRS RIGHT   T2 Neutral   T3 Neutral   T4 FRS LEFT   T5 FRS RIGHT   T6 FRS LEFT   T7 FRS RIGHT   T8 FRS RIGHT   T9 FRS LEFT   T10 NS-left,R-right   T11 NS-left,R-right   T12 NS-left,R-right     Rib cage: R1 inhaled bilaterally, R6 external torsion on right    Upper extremity: right thoracic outlet TTA     Lumbar Spine   L1 NS-left,R-right   L2 FRS RIGHT   L3 Neutral   L4 Neutral   L5 Neutral       Key   F= Flexed   E = Extended   R = Rotated   S = Sidebent   TTA = tissue texture abnormality       Neurovascular Exam:  Spurling's - negative on left and negative on right  Lhermitte's Sign - absent  Seated straight leg raise - negative on left and negative on right  Reflexes +2/4 and symmetric at the biceps, brachioradialis, and triceps bilaterally   Sensation intact to light touch in the C5-T1 dermatomes bilaterally.   Vitor's sign- absent  Intact and symmetric radial pulses bilaterally.      Assessment:      Encounter Diagnoses   Name Primary?    Chronic tension-type headache, not intractable Yes     Cervicalgia     TMJ tenderness, bilateral     Poor posture     Myalgia     Somatic dysfunction of head region     Somatic dysfunction of thoracic region     Cervical (neck) region somatic dysfunction     Somatic dysfunction of rib cage region     Somatic dysfunction of lumbar region     Upper extremity somatic dysfunction           Plan:   1. Right neck pain and tension headaches with associated TMJ tenderness bilaterally likely secondary to underlying poor posture and biomechanical restrictions of the upper kinetic chain with compensatory firing patterns.  No cervical radicular symptoms appreciated on physical exam today.  - OMT performed today to address associated biomechanical restrictions  and HEP started.   - discussed proper posture practices at work desk  - discussed option of over-the-counter bite guard wear if the jaw pain persists    2. OMT 5-6 regions. Oral consent obtained.  Reviewed benefits and potential side effects.   - OMT indicated today due to signs and symptoms as well as local and remote somatic dysfunction findings and their related neurokinetic, lymphatic, fascial and/or arteriovenous body connections.   - OMT techniques used: Myofascial Release, Muscle Energy, High Velocity Low Amplitude and Still's Technique   - Treatment was tolerated well. Improvement noted in segmental mobility post-treatment in dysfunctional regions. There were no adverse events and no complications immediately following treatment.     3. Pt. Given the following HEP:  A) Scalene, upper trapezius, and levator scapulae stretches : hold neck sidebending stretch for 30 seconds in each plane, bilaterally, twice daily. Hand out given.   B)  Supine Thoracic extension exercise: 10-15 reps, twice daily. Handout also given.   C) Cervicothoracic junction mobilization exercise: 10-15 reps, twice daily. Handout also given.   D) Seated anterior pelvic tilt exercise: rotate pelvis forward to sit on ischial tuberosities while  maintaining neutral shoulder positioning. Repeat frequently throughout the day.   E) Quadratus lumborum self-stretch on all fours: hold stretch for 30 seconds, repeating 2-3 times on each side. Do stretch twice daily. Hand-out also given.   F) bilateral pterygoid self release exercise:  Perform as needed for jaw tension bilaterally.  Handout given.    [address]83 HOME EXERCISE PROGRAM (HEP):  The patient was taught a homegoing physical therapy regimen as described above. The patient demonstrated understanding of the exercises and proper technique of their execution. This interaction took 15 minutes.     4. Follow-up in 3 weeks for reevaluation    5. Patient agreeable to today's plan and all questions were answered    This note is dictated using the M*Modal Fluency Direct word recognition program. There are word recognition mistakes that are occasionally missed on review.

## 2022-07-13 ENCOUNTER — PATIENT MESSAGE (OUTPATIENT)
Dept: HEMATOLOGY/ONCOLOGY | Facility: CLINIC | Age: 37
End: 2022-07-13
Payer: COMMERCIAL

## 2022-07-13 DIAGNOSIS — D50.8 IRON DEFICIENCY ANEMIA SECONDARY TO INADEQUATE DIETARY IRON INTAKE: Primary | ICD-10-CM

## 2022-10-11 ENCOUNTER — OFFICE VISIT (OUTPATIENT)
Dept: OBSTETRICS AND GYNECOLOGY | Facility: CLINIC | Age: 37
End: 2022-10-11
Payer: COMMERCIAL

## 2022-10-11 VITALS
WEIGHT: 203.94 LBS | HEIGHT: 60 IN | BODY MASS INDEX: 40.04 KG/M2 | DIASTOLIC BLOOD PRESSURE: 80 MMHG | SYSTOLIC BLOOD PRESSURE: 118 MMHG

## 2022-10-11 DIAGNOSIS — Z01.419 ENCOUNTER FOR WELL WOMAN EXAM WITH ROUTINE GYNECOLOGICAL EXAM: Primary | ICD-10-CM

## 2022-10-11 DIAGNOSIS — N93.9 ABNORMAL UTERINE BLEEDING (AUB): ICD-10-CM

## 2022-10-11 PROCEDURE — 3074F PR MOST RECENT SYSTOLIC BLOOD PRESSURE < 130 MM HG: ICD-10-PCS | Mod: CPTII,S$GLB,, | Performed by: OBSTETRICS & GYNECOLOGY

## 2022-10-11 PROCEDURE — 3079F DIAST BP 80-89 MM HG: CPT | Mod: CPTII,S$GLB,, | Performed by: OBSTETRICS & GYNECOLOGY

## 2022-10-11 PROCEDURE — 99395 PREV VISIT EST AGE 18-39: CPT | Mod: S$GLB,,, | Performed by: OBSTETRICS & GYNECOLOGY

## 2022-10-11 PROCEDURE — 1159F PR MEDICATION LIST DOCUMENTED IN MEDICAL RECORD: ICD-10-PCS | Mod: CPTII,S$GLB,, | Performed by: OBSTETRICS & GYNECOLOGY

## 2022-10-11 PROCEDURE — 99999 PR PBB SHADOW E&M-EST. PATIENT-LVL III: CPT | Mod: PBBFAC,,, | Performed by: OBSTETRICS & GYNECOLOGY

## 2022-10-11 PROCEDURE — 99395 PR PREVENTIVE VISIT,EST,18-39: ICD-10-PCS | Mod: S$GLB,,, | Performed by: OBSTETRICS & GYNECOLOGY

## 2022-10-11 PROCEDURE — 3079F PR MOST RECENT DIASTOLIC BLOOD PRESSURE 80-89 MM HG: ICD-10-PCS | Mod: CPTII,S$GLB,, | Performed by: OBSTETRICS & GYNECOLOGY

## 2022-10-11 PROCEDURE — 3008F BODY MASS INDEX DOCD: CPT | Mod: CPTII,S$GLB,, | Performed by: OBSTETRICS & GYNECOLOGY

## 2022-10-11 PROCEDURE — 99999 PR PBB SHADOW E&M-EST. PATIENT-LVL III: ICD-10-PCS | Mod: PBBFAC,,, | Performed by: OBSTETRICS & GYNECOLOGY

## 2022-10-11 PROCEDURE — 3008F PR BODY MASS INDEX (BMI) DOCUMENTED: ICD-10-PCS | Mod: CPTII,S$GLB,, | Performed by: OBSTETRICS & GYNECOLOGY

## 2022-10-11 PROCEDURE — 1159F MED LIST DOCD IN RCRD: CPT | Mod: CPTII,S$GLB,, | Performed by: OBSTETRICS & GYNECOLOGY

## 2022-10-11 PROCEDURE — 3074F SYST BP LT 130 MM HG: CPT | Mod: CPTII,S$GLB,, | Performed by: OBSTETRICS & GYNECOLOGY

## 2022-10-11 RX ORDER — MEDROXYPROGESTERONE ACETATE 10 MG/1
10 TABLET ORAL DAILY
Qty: 10 TABLET | Refills: 12 | Status: SHIPPED | OUTPATIENT
Start: 2022-10-11 | End: 2024-03-22

## 2022-10-11 NOTE — PROGRESS NOTES
History & Physical  Gynecology      SUBJECTIVE:     Chief Complaint: Well Woman       History of Present Illness:    Lauren K Damico is a 37 y.o. female  here for annual routine Pap and checkup. Patient's last menstrual period was 2022 (exact date)..  She has no unusual complaints.      She describes her periods as irregular, not having a period since Labor Day.  Had irregular menses prior to her delivery 2 years ago.  Regular afterwards but now is irregular again.  Admits to a 30lb weight gain since delivery.  denies break through bleeding.   denies vaginal itching or irritation.  denies vaginal discharge.    She is sexually active with 1 partner  She uses condoms for contraception.    History of abnormal pap: yes, LEEP in , normal since then  Last Pap: (2021)  Last MMG: No  Last Colonoscopy:  No        Review of patient's allergies indicates:  No Known Allergies    Past Medical History:   Diagnosis Date    Abnormal Pap smear 12    colpo/LEEP    Anemia     Anxiety     PCOS (polycystic ovarian syndrome)      Past Surgical History:   Procedure Laterality Date    CERVICAL BIOPSY  W/ LOOP ELECTRODE EXCISION  10-17-12     SECTION N/A 2020    Procedure:  SECTION;  Surgeon: Karo Zayas MD;  Location: [facility] L&D;  Service: OB/GYN;  Laterality: N/A;    GALLBLADDER SURGERY      GASTRIC RESTRICTION SURGERY      gastric sleeve in 2018    WISDOM TOOTH EXTRACTION       OB History          1    Para   1    Term   1            AB        Living   1         SAB        IAB        Ectopic        Multiple   0    Live Births   1               Family History   Problem Relation Age of Onset    Ovarian cancer Paternal Grandmother     Breast cancer Paternal Aunt     Ovarian cancer Paternal Aunt     Breast cancer Paternal Aunt     Uterine cancer Paternal Aunt     Colon cancer Neg Hx      Social History     Tobacco Use    Smoking status: Never    Smokeless tobacco: Never    Substance Use Topics    Alcohol use: Yes     Alcohol/week: 1.0 standard drink     Types: 1 Glasses of wine per week    Drug use: No       Current Outpatient Medications   Medication Sig    acetaminophen (TYLENOL) 325 MG tablet Take 2 tablets (650 mg total) by mouth every 6 (six) hours as needed for Pain.    ALPRAZolam (XANAX) 0.5 MG tablet Take 1 tablet (0.5 mg total) by mouth every evening.    desvenlafaxine succinate (PRISTIQ) 100 MG Tb24 Take 1 tablet (100 mg total) by mouth once daily.    esomeprazole (NEXIUM) 20 MG capsule Take 20 mg by mouth 2 (two) times daily.    iron,carb/vit C/vit B12/folic (IRON 100 PLUS ORAL)     PNV/iron/omega3/folic acid/min (PRENAT MV-IRON-CA-ST-WL3-FFOVO ORAL)     lactic acid-citric-potassium (PHEXXI) 1.8-1-0.4 % Gel Place 1 application vaginally as needed.    medroxyPROGESTERone (PROVERA) 10 MG tablet Take 1 tablet (10 mg total) by mouth once daily.     No current facility-administered medications for this visit.         Review of Systems:  Review of Systems   Constitutional:  Negative for activity change, appetite change, chills, fatigue, fever and unexpected weight change.   Respiratory:  Negative for cough, shortness of breath and wheezing.    Cardiovascular:  Negative for chest pain and leg swelling.   Gastrointestinal:  Negative for abdominal pain, constipation, diarrhea, nausea and vomiting.   Endocrine: Negative for hair loss and hot flashes.   Genitourinary:  Positive for menstrual problem. Negative for decreased libido, dyspareunia, dysuria, frequency, pelvic pain, vaginal bleeding, vaginal discharge and vaginal pain.   Integumentary:  Negative for acne, hair changes, nipple discharge and breast skin changes.   Neurological:  Negative for headaches.   Psychiatric/Behavioral:  Negative for sleep disturbance.    Breast: Negative for mastodynia, nipple discharge and skin changes     OBJECTIVE:     Physical Exam:  Physical Exam  Constitutional:       Appearance: She is  well-developed.   HENT:      Head: Normocephalic and atraumatic.   Eyes:      General: No scleral icterus.        Right eye: No discharge.         Left eye: No discharge.      Conjunctiva/sclera: Conjunctivae normal.   Pulmonary:      Effort: Pulmonary effort is normal.      Breath sounds: No stridor.   Chest:      Chest wall: No mass or tenderness.   Breasts:     Breasts are symmetrical.      Right: No inverted nipple, mass, nipple discharge, skin change or tenderness.      Left: No inverted nipple, mass, nipple discharge, skin change or tenderness.   Abdominal:      General: There is no distension.      Palpations: Abdomen is soft.      Tenderness: There is no abdominal tenderness.   Genitourinary:     Labia:         Right: No rash, tenderness, lesion or injury.         Left: No rash, tenderness, lesion or injury.       Vagina: Normal.      Cervix: No cervical motion tenderness, discharge or friability.      Adnexa:         Right: No mass, tenderness or fullness.          Left: No mass, tenderness or fullness.        Comments: Normal external genitalia.  Normal hair distribution.  Urethral meatus normal. No cervical lesions or masses.  No vaginal bleeding noted.  No adnexal or uterine tenderness.  No palpable adnexal masses.  Musculoskeletal:         General: Normal range of motion.   Skin:     General: Skin is warm and dry.   Neurological:      Mental Status: She is alert and oriented to person, place, and time.   Psychiatric:         Behavior: Behavior normal.         Thought Content: Thought content normal.         Judgment: Judgment normal.         ASSESSMENT:       ICD-10-CM ICD-9-CM    1. Encounter for well woman exam with routine gynecological exam  Z01.419 V72.31       2. Abnormal uterine bleeding (AUB)  N93.9 626.9 medroxyPROGESTERone (PROVERA) 10 MG tablet             Plan:      Darby was seen today for well woman.    Diagnoses and all orders for this visit:    Encounter for well woman exam with routine  gynecological exam  - pap smear up to date  - MMG and Cscope not indicated at this time    Abnormal uterine bleeding (AUB)  - Pt with irregular menses.   - Discussed weight and how this can affect menses.  All questions answered  - Discussed option of cyclic provera.  Rx sent  -     medroxyPROGESTERone (PROVERA) 10 MG tablet; Take 1 tablet (10 mg total) by mouth once daily.        No orders of the defined types were placed in this encounter.      Follow up in about 1 year (around 10/11/2023) for annual.    Counseling time: 15 minutes    Aisha Hussein

## 2022-10-23 ENCOUNTER — OFFICE VISIT (OUTPATIENT)
Dept: URGENT CARE | Facility: CLINIC | Age: 37
End: 2022-10-23
Payer: COMMERCIAL

## 2022-10-23 VITALS
HEIGHT: 60 IN | OXYGEN SATURATION: 100 % | TEMPERATURE: 100 F | WEIGHT: 203 LBS | HEART RATE: 103 BPM | DIASTOLIC BLOOD PRESSURE: 89 MMHG | SYSTOLIC BLOOD PRESSURE: 131 MMHG | RESPIRATION RATE: 16 BRPM | BODY MASS INDEX: 39.85 KG/M2

## 2022-10-23 DIAGNOSIS — U07.1 COVID: ICD-10-CM

## 2022-10-23 DIAGNOSIS — Z11.9 SCREENING EXAMINATION FOR UNSPECIFIED INFECTIOUS DISEASE: Primary | ICD-10-CM

## 2022-10-23 DIAGNOSIS — U07.1 COVID-19 VIRUS DETECTED: ICD-10-CM

## 2022-10-23 DIAGNOSIS — R05.9 COUGH, UNSPECIFIED TYPE: ICD-10-CM

## 2022-10-23 DIAGNOSIS — J31.0 RHINITIS, UNSPECIFIED TYPE: ICD-10-CM

## 2022-10-23 LAB
CTP QC/QA: YES
SARS-COV-2 RDRP RESP QL NAA+PROBE: POSITIVE

## 2022-10-23 PROCEDURE — 3079F DIAST BP 80-89 MM HG: CPT | Mod: CPTII,S$GLB,, | Performed by: FAMILY MEDICINE

## 2022-10-23 PROCEDURE — 1159F MED LIST DOCD IN RCRD: CPT | Mod: CPTII,S$GLB,, | Performed by: FAMILY MEDICINE

## 2022-10-23 PROCEDURE — 3079F PR MOST RECENT DIASTOLIC BLOOD PRESSURE 80-89 MM HG: ICD-10-PCS | Mod: CPTII,S$GLB,, | Performed by: FAMILY MEDICINE

## 2022-10-23 PROCEDURE — 87635 SARS-COV-2 COVID-19 AMP PRB: CPT | Mod: QW,S$GLB,, | Performed by: FAMILY MEDICINE

## 2022-10-23 PROCEDURE — 1159F PR MEDICATION LIST DOCUMENTED IN MEDICAL RECORD: ICD-10-PCS | Mod: CPTII,S$GLB,, | Performed by: FAMILY MEDICINE

## 2022-10-23 PROCEDURE — 99214 PR OFFICE/OUTPT VISIT, EST, LEVL IV, 30-39 MIN: ICD-10-PCS | Mod: S$GLB,,, | Performed by: FAMILY MEDICINE

## 2022-10-23 PROCEDURE — 87635: ICD-10-PCS | Mod: QW,S$GLB,, | Performed by: FAMILY MEDICINE

## 2022-10-23 PROCEDURE — 3075F SYST BP GE 130 - 139MM HG: CPT | Mod: CPTII,S$GLB,, | Performed by: FAMILY MEDICINE

## 2022-10-23 PROCEDURE — 3075F PR MOST RECENT SYSTOLIC BLOOD PRESS GE 130-139MM HG: ICD-10-PCS | Mod: CPTII,S$GLB,, | Performed by: FAMILY MEDICINE

## 2022-10-23 PROCEDURE — 99214 OFFICE O/P EST MOD 30 MIN: CPT | Mod: S$GLB,,, | Performed by: FAMILY MEDICINE

## 2022-10-23 RX ORDER — BENZONATATE 200 MG/1
200 CAPSULE ORAL 3 TIMES DAILY PRN
Qty: 30 CAPSULE | Refills: 0 | Status: SHIPPED | OUTPATIENT
Start: 2022-10-23 | End: 2022-11-02

## 2022-10-23 RX ORDER — IPRATROPIUM BROMIDE 21 UG/1
2 SPRAY, METERED NASAL EVERY 12 HOURS PRN
Qty: 30 ML | Refills: 0 | Status: SHIPPED | OUTPATIENT
Start: 2022-10-23 | End: 2022-11-28

## 2022-10-23 NOTE — PROGRESS NOTES
Subjective:       Patient ID: Lauren K Damico is a 37 y.o. female.    Vitals:  height is 5' (1.524 m) and weight is 92.1 kg (203 lb). Her temperature is 100 °F (37.8 °C). Her blood pressure is 131/89 and her pulse is 103. Her respiration is 16 and oxygen saturation is 100%.     Chief Complaint: No chief complaint on file.    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 The current episode started today. The problem has been gradually worsening. The cough is Non-productive. Associated symptoms include chills, a fever, headaches, nasal congestion, postnasal drip, a sore throat and sweats. Pertinent negatives include no chest pain, ear congestion, ear pain, heartburn, hemoptysis, myalgias, rash, rhinorrhea, shortness of breath, weight loss or wheezing.     Constitution: Positive for chills, sweating, fatigue and fever.   HENT:  Positive for postnasal drip, sinus pressure and sore throat. Negative for ear pain.    Cardiovascular:  Negative for chest pain.   Respiratory:  Positive for cough. Negative for bloody sputum, shortness of breath and wheezing.    Gastrointestinal:  Negative for heartburn.   Musculoskeletal:  Negative for muscle ache.   Skin:  Negative for rash and erythema.   Neurological:  Positive for headaches.     Objective:      Physical Exam   Constitutional: She is oriented to person, place, and time. She does not appear ill. She appears distressed. obesity  HENT:   Head: Normocephalic and atraumatic.   Ears:   Right Ear: Tympanic membrane, external ear and ear canal normal.   Left Ear: Tympanic membrane, external ear and ear canal normal.   Nose: Rhinorrhea and congestion present.   Mouth/Throat: Mucous membranes are dry. Oropharynx is clear.   Eyes: Pupils are equal, round, and reactive to  light. Right eye exhibits no discharge. Left eye exhibits no discharge. No scleral icterus.   Neck: Neck supple. No neck rigidity present.   Cardiovascular: Normal rate, regular rhythm, normal heart sounds and normal pulses.   Pulmonary/Chest: Effort normal and breath sounds normal. No stridor. No respiratory distress. She has no wheezes. She has no rhonchi.   Abdominal: Normal appearance and bowel sounds are normal. Soft. flat abdomen   Musculoskeletal: Normal range of motion.         General: No swelling, tenderness, deformity or signs of injury. Normal range of motion.      Cervical back: She exhibits no tenderness.   Neurological: no focal deficit. She is alert, oriented to person, place, and time and at baseline. No cranial nerve deficit.   Skin: Skin is warm, dry and not pale. Capillary refill takes less than 2 seconds. No bruising and No erythema jaundice  Psychiatric: Her behavior is normal. Mood, judgment and thought content normal.   Nursing note and vitals reviewed.      Assessment:Plan:   1. Screening examination for unspecified infectious disease  - POCT COVID-19 Rapid Screening    2. Rhinitis, unspecified type  - ipratropium (ATROVENT) 21 mcg (0.03 %) nasal spray; 2 sprays by Nasal route every 12 (twelve) hours as needed for Rhinitis.  Dispense: 30 mL; Refill: 0    3. Cough, unspecified type  - benzonatate (TESSALON) 200 MG capsule; Take 1 capsule (200 mg total) by mouth 3 (three) times daily as needed.  Dispense: 30 capsule; Refill: 0   4.  Covid 19  Isolate for 5 days then may return to public with mask  For 5 additional days then d/c

## 2022-10-27 ENCOUNTER — OFFICE VISIT (OUTPATIENT)
Dept: PRIMARY CARE CLINIC | Facility: CLINIC | Age: 37
End: 2022-10-27
Payer: COMMERCIAL

## 2022-10-27 ENCOUNTER — NURSE TRIAGE (OUTPATIENT)
Dept: ADMINISTRATIVE | Facility: CLINIC | Age: 37
End: 2022-10-27
Payer: COMMERCIAL

## 2022-10-27 DIAGNOSIS — J30.2 SEASONAL ALLERGIES: Primary | ICD-10-CM

## 2022-10-27 DIAGNOSIS — R05.9 COUGH, UNSPECIFIED TYPE: ICD-10-CM

## 2022-10-27 PROCEDURE — 1159F PR MEDICATION LIST DOCUMENTED IN MEDICAL RECORD: ICD-10-PCS | Mod: CPTII,95,, | Performed by: NURSE PRACTITIONER

## 2022-10-27 PROCEDURE — 1159F MED LIST DOCD IN RCRD: CPT | Mod: CPTII,95,, | Performed by: NURSE PRACTITIONER

## 2022-10-27 PROCEDURE — 1160F PR REVIEW ALL MEDS BY PRESCRIBER/CLIN PHARMACIST DOCUMENTED: ICD-10-PCS | Mod: CPTII,95,, | Performed by: NURSE PRACTITIONER

## 2022-10-27 PROCEDURE — 99204 OFFICE O/P NEW MOD 45 MIN: CPT | Mod: 95,,, | Performed by: NURSE PRACTITIONER

## 2022-10-27 PROCEDURE — 99204 PR OFFICE/OUTPT VISIT, NEW, LEVL IV, 45-59 MIN: ICD-10-PCS | Mod: 95,,, | Performed by: NURSE PRACTITIONER

## 2022-10-27 PROCEDURE — 1160F RVW MEDS BY RX/DR IN RCRD: CPT | Mod: CPTII,95,, | Performed by: NURSE PRACTITIONER

## 2022-10-27 RX ORDER — CETIRIZINE HYDROCHLORIDE 10 MG/1
10 TABLET ORAL DAILY
Qty: 30 TABLET | Refills: 11 | Status: SHIPPED | OUTPATIENT
Start: 2022-10-27 | End: 2022-11-28

## 2022-10-27 RX ORDER — PROMETHAZINE HYDROCHLORIDE AND DEXTROMETHORPHAN HYDROBROMIDE 6.25; 15 MG/5ML; MG/5ML
5 SYRUP ORAL EVERY 4 HOURS PRN
Qty: 180 ML | Refills: 0 | Status: SHIPPED | OUTPATIENT
Start: 2022-10-27 | End: 2022-11-06

## 2022-10-27 RX ORDER — ALBUTEROL SULFATE 90 UG/1
2 AEROSOL, METERED RESPIRATORY (INHALATION) EVERY 6 HOURS PRN
Qty: 18 G | Refills: 0 | Status: SHIPPED | OUTPATIENT
Start: 2022-10-27 | End: 2022-11-28

## 2022-10-27 NOTE — TELEPHONE ENCOUNTER
Pt states cough not improving.  Has virtual visit scheduled for today at 11am.  States to call provider or virtual visit within 24 hours.  Pt has virtual appt today.  All questions answered, advised to call back for any worsening symptoms.    Reason for Disposition   [1] Continuous (nonstop) coughing interferes with work or school AND [2] no improvement using cough treatment per Care Advice    Additional Information   Negative: SEVERE difficulty breathing (e.g., struggling for each breath, speaks in single words)   Negative: Difficult to awaken or acting confused (e.g., disoriented, slurred speech)   Negative: Bluish (or gray) lips or face now   Negative: Shock suspected (e.g., cold/pale/clammy skin, too weak to stand, low BP, rapid pulse)   Negative: Sounds like a life-threatening emergency to the triager   Negative: SEVERE or constant chest pain or pressure  (Exception: Mild central chest pain, present only when coughing.)   Negative: MODERATE difficulty breathing (e.g., speaks in phrases, SOB even at rest, pulse 100-120)   Negative: [1] Headache AND [2] stiff neck (can't touch chin to chest)   Negative: Oxygen level (e.g., pulse oximetry) 90 percent or lower   Negative: Chest pain or pressure  (Exception: MILD central chest pain, present only when coughing)   Negative: Patient sounds very sick or weak to the triager   Negative: MILD difficulty breathing (e.g., minimal/no SOB at rest, SOB with walking, pulse <100)   Negative: Fever > 103 F (39.4 C)   Negative: [1] Fever > 101 F (38.3 C) AND [2] age > 60 years   Negative: [1] Fever > 100.0 F (37.8 C) AND [2] bedridden (e.g., CVA, chronic illness, recovering from surgery)   Negative: Oxygen level (e.g., pulse oximetry) 91 to 94 percent   Negative: [1] HIGH RISK for severe COVID complications (e.g., weak immune system, age > 64 years, obesity with BMI 30 or higher, pregnant, chronic lung disease or other chronic medical condition) AND [2] COVID symptoms (e.g., cough,  fever)  (Exceptions: Already seen by PCP and no new or worsening symptoms.)   Negative: [1] HIGH RISK patient AND [2] influenza is widespread in the community AND [3] ONE OR MORE respiratory symptoms: cough, sore throat, runny or stuffy nose   Negative: [1] HIGH RISK patient AND [2] influenza exposure within the last 7 days AND [3] ONE OR MORE respiratory symptoms: cough, sore throat, runny or stuffy nose   Negative: Fever present > 3 days (72 hours)   Negative: [1] Fever returns after gone for over 24 hours AND [2] symptoms worse or not improved    Protocols used: Coronavirus (COVID-19) Diagnosed or [id number]

## 2022-10-27 NOTE — PROGRESS NOTES
[facility] Note    Chief Complaint      Chief Complaint   Patient presents with    Cough    Sinus Problem    Nasal Congestion     Covid positive       History of Present Illness      Lauren K Damico is a 37 y.o. female who presents today via virtual visit for   Chief Complaint   Patient presents with    Cough    Sinus Problem    Nasal Congestion     Covid positive    She tested positive for covid 5 days ago. She reports constant cough keeping her awake at night, post nasal drip, sinus congestion. She has tried treating with nyquil and cough drops but no resolution of symptoms.  She denies any SOB, chest pain, N/V, unintentional weight loss, loss of appetite, fatigue, diarrhea, constipation. She is active daily and remains independent with ADL's.     Cough  This is a new problem. The current episode started in the past 7 days. The problem has been unchanged. The problem occurs every few minutes. The cough is Non-productive. Associated symptoms include nasal congestion, postnasal drip, rhinorrhea and a sore throat. Pertinent negatives include no chest pain, chills, ear congestion, ear pain, fever, headaches, heartburn, hemoptysis, myalgias, rash, shortness of breath, sweats, weight loss or wheezing. The symptoms are aggravated by cold air and lying down. She has tried OTC cough suppressant and body position changes for the symptoms. The treatment provided mild relief. Her past medical history is significant for pneumonia. There is no history of asthma, bronchiectasis, bronchitis, COPD, emphysema or environmental allergies.      Review of Systems   Constitutional:  Negative for chills, fever and weight loss.   HENT:  Positive for postnasal drip, rhinorrhea and sore throat. Negative for ear pain.    Respiratory:  Positive for cough. Negative for hemoptysis, shortness of breath and wheezing.    Cardiovascular:  Negative for chest pain.   Gastrointestinal:  Negative for heartburn.   Musculoskeletal:   Negative for myalgias.   Skin:  Negative for rash.   Neurological:  Negative for headaches.   Endo/Heme/Allergies:  Negative for environmental allergies.      Family History:  family history includes Breast cancer in her paternal aunt and paternal aunt; Ovarian cancer in her paternal aunt and paternal grandmother; Uterine cancer in her paternal aunt.   Family history was reviewed with patient.     Medications:  Outpatient Encounter Medications as of 10/27/2022   Medication Sig Dispense Refill    acetaminophen (TYLENOL) 325 MG tablet Take 2 tablets (650 mg total) by mouth every 6 (six) hours as needed for Pain. 30 tablet 0    albuterol (PROVENTIL HFA) 90 mcg/actuation inhaler Inhale 2 puffs into the lungs every 6 (six) hours as needed for Wheezing. Rescue 18 g 0    ALPRAZolam (XANAX) 0.5 MG tablet Take 1 tablet (0.5 mg total) by mouth every evening. 10 tablet 0    benzonatate (TESSALON) 200 MG capsule Take 1 capsule (200 mg total) by mouth 3 (three) times daily as needed. 30 capsule 0    cetirizine (ZYRTEC) 10 MG tablet Take 1 tablet (10 mg total) by mouth once daily. 30 tablet 11    desvenlafaxine succinate (PRISTIQ) 100 MG Tb24 Take 1 tablet (100 mg total) by mouth once daily. 30 tablet 11    esomeprazole (NEXIUM) 20 MG capsule Take 20 mg by mouth 2 (two) times daily.  0    ipratropium (ATROVENT) 21 mcg (0.03 %) nasal spray 2 sprays by Nasal route every 12 (twelve) hours as needed for Rhinitis. 30 mL 0    iron,carb/vit C/vit B12/folic (IRON 100 PLUS ORAL)       medroxyPROGESTERone (PROVERA) 10 MG tablet Take 1 tablet (10 mg total) by mouth once daily. 10 tablet 12    promethazine-dextromethorphan (PROMETHAZINE-DM) 6.25-15 mg/5 mL Syrp Take 5 mLs by mouth every 4 (four) hours as needed. 180 mL 0     No facility-administered encounter medications on file as of 10/27/2022.       Allergies:  Review of patient's allergies indicates:  No Known Allergies    Health Maintenance:  Health Maintenance   Topic Date Due     Hepatitis C Screening  Never done    TETANUS VACCINE  08/28/2030    Colonoscopy  04/13/2032    Lipid Panel  Completed     Health Maintenance Topics with due status: Not Due       Topic Last Completion Date    Colonoscopy 08/24/2018    TETANUS VACCINE 08/28/2020    Cervical Cancer Screening 01/07/2021       Physical Exam         Assessment/Plan     Lauren K Damico is a 37 y.o.female with:    Seasonal allergies  -     cetirizine (ZYRTEC) 10 MG tablet; Take 1 tablet (10 mg total) by mouth once daily.  Dispense: 30 tablet; Refill: 11    Cough, unspecified type  -     promethazine-dextromethorphan (PROMETHAZINE-DM) 6.25-15 mg/5 mL Syrp; Take 5 mLs by mouth every 4 (four) hours as needed.  Dispense: 180 mL; Refill: 0  -     albuterol (PROVENTIL HFA) 90 mcg/actuation inhaler; Inhale 2 puffs into the lungs every 6 (six) hours as needed for Wheezing. Rescue  Dispense: 18 g; Refill: 0      As above, continue current medications and maintain follow up with specialists.  Return to clinic as needed.    I spent 17 minutes on the day of this virtual encounter for preparing, evaluating, treating, and discussing plan of care with this patient.  Greater than 50% of this time was spent face to face via virtual visit with patient.  All questions were answered to patient's satisfaction.        Karen L Spencer, NP-C [facility]

## 2022-11-28 ENCOUNTER — OFFICE VISIT (OUTPATIENT)
Dept: FAMILY MEDICINE | Facility: CLINIC | Age: 37
End: 2022-11-28
Payer: COMMERCIAL

## 2022-11-28 VITALS
WEIGHT: 201.25 LBS | OXYGEN SATURATION: 96 % | DIASTOLIC BLOOD PRESSURE: 70 MMHG | BODY MASS INDEX: 39.51 KG/M2 | TEMPERATURE: 98 F | HEIGHT: 60 IN | HEART RATE: 79 BPM | SYSTOLIC BLOOD PRESSURE: 100 MMHG

## 2022-11-28 DIAGNOSIS — F39 MOOD DISORDER: ICD-10-CM

## 2022-11-28 DIAGNOSIS — Z00.00 ANNUAL PHYSICAL EXAM: Primary | ICD-10-CM

## 2022-11-28 PROCEDURE — 99999 PR PBB SHADOW E&M-EST. PATIENT-LVL III: CPT | Mod: PBBFAC,,, | Performed by: FAMILY MEDICINE

## 2022-11-28 PROCEDURE — 99395 PR PREVENTIVE VISIT,EST,18-39: ICD-10-PCS | Mod: S$GLB,,, | Performed by: FAMILY MEDICINE

## 2022-11-28 PROCEDURE — 1159F PR MEDICATION LIST DOCUMENTED IN MEDICAL RECORD: ICD-10-PCS | Mod: CPTII,S$GLB,, | Performed by: FAMILY MEDICINE

## 2022-11-28 PROCEDURE — 99999 PR PBB SHADOW E&M-EST. PATIENT-LVL III: ICD-10-PCS | Mod: PBBFAC,,, | Performed by: FAMILY MEDICINE

## 2022-11-28 PROCEDURE — 99395 PREV VISIT EST AGE 18-39: CPT | Mod: S$GLB,,, | Performed by: FAMILY MEDICINE

## 2022-11-28 PROCEDURE — 3074F SYST BP LT 130 MM HG: CPT | Mod: CPTII,S$GLB,, | Performed by: FAMILY MEDICINE

## 2022-11-28 PROCEDURE — 1159F MED LIST DOCD IN RCRD: CPT | Mod: CPTII,S$GLB,, | Performed by: FAMILY MEDICINE

## 2022-11-28 PROCEDURE — 3074F PR MOST RECENT SYSTOLIC BLOOD PRESSURE < 130 MM HG: ICD-10-PCS | Mod: CPTII,S$GLB,, | Performed by: FAMILY MEDICINE

## 2022-11-28 PROCEDURE — 1160F RVW MEDS BY RX/DR IN RCRD: CPT | Mod: CPTII,S$GLB,, | Performed by: FAMILY MEDICINE

## 2022-11-28 PROCEDURE — 3078F DIAST BP <80 MM HG: CPT | Mod: CPTII,S$GLB,, | Performed by: FAMILY MEDICINE

## 2022-11-28 PROCEDURE — 1160F PR REVIEW ALL MEDS BY PRESCRIBER/CLIN PHARMACIST DOCUMENTED: ICD-10-PCS | Mod: CPTII,S$GLB,, | Performed by: FAMILY MEDICINE

## 2022-11-28 PROCEDURE — 3008F PR BODY MASS INDEX (BMI) DOCUMENTED: ICD-10-PCS | Mod: CPTII,S$GLB,, | Performed by: FAMILY MEDICINE

## 2022-11-28 PROCEDURE — 3008F BODY MASS INDEX DOCD: CPT | Mod: CPTII,S$GLB,, | Performed by: FAMILY MEDICINE

## 2022-11-28 PROCEDURE — 3078F PR MOST RECENT DIASTOLIC BLOOD PRESSURE < 80 MM HG: ICD-10-PCS | Mod: CPTII,S$GLB,, | Performed by: FAMILY MEDICINE

## 2022-11-28 RX ORDER — BUSPIRONE HYDROCHLORIDE 5 MG/1
5 TABLET ORAL 2 TIMES DAILY
Qty: 60 TABLET | Refills: 11 | Status: SHIPPED | OUTPATIENT
Start: 2022-11-28 | End: 2023-02-15

## 2022-11-28 RX ORDER — DESVENLAFAXINE 100 MG/1
100 TABLET, EXTENDED RELEASE ORAL DAILY
Qty: 30 TABLET | Refills: 11 | Status: SHIPPED | OUTPATIENT
Start: 2022-11-28 | End: 2023-02-15

## 2022-11-28 NOTE — PROGRESS NOTES
Answers submitted by the patient for this visit:  Review of Systems Questionnaire (Submitted on 11/28/2022)  activity change: No  unexpected weight change: No  neck pain: No  hearing loss: No  rhinorrhea: No  trouble swallowing: No  eye discharge: No  visual disturbance: No  chest tightness: No  wheezing: No  chest pain: No  palpitations: No  blood in stool: No  constipation: No  vomiting: No  diarrhea: No  polydipsia: No  polyuria: No  difficulty urinating: No  hematuria: No  menstrual problem: No  dysuria: No  joint swelling: No  arthralgias: No  headaches: Yes  weakness: No  confusion: No  dysphoric mood: No  Answers submitted by the patient for this visit:  Review of Systems Questionnaire (Submitted on 11/28/2022)  activity change: No  unexpected weight change: No  neck pain: No  hearing loss: No  rhinorrhea: No  trouble swallowing: No  eye discharge: No  visual disturbance: No  chest tightness: No  wheezing: No  chest pain: No  palpitations: No  blood in stool: No  constipation: No  vomiting: No  diarrhea: No  polydipsia: No  polyuria: No  difficulty urinating: No  hematuria: No  menstrual problem: No  dysuria: No  joint swelling: No  arthralgias: No  headaches: Yes  weakness: No  confusion: No  dysphoric mood: No

## 2022-11-28 NOTE — PROGRESS NOTES
Subjective:       Patient ID: Lauren K Damico is a 37 y.o. female.    Chief Complaint: Annual Exam    37 year old female presents with annual exam.    She has headache with nausea. She states she has put herself under a lot of stress. She states her blood pressure is always controlled with the headache. She is getting hives, and nausea with upset stomach. She loves her jobs, but it is stressful. She is trying to get things done to be present with her mom. She states it doesn't happen daily , but it builds up.         Past Medical History:   Diagnosis Date    Abnormal Pap smear 14    colpo/LEEP    Anemia     Anxiety     PCOS (polycystic ovarian syndrome)       Past Surgical History:   Procedure Laterality Date    CERVICAL BIOPSY  W/ LOOP ELECTRODE EXCISION  10-17-12     SECTION N/A 2020    Procedure:  SECTION;  Surgeon: Karo Zayas MD;  Location: [facility] L&D;  Service: OB/GYN;  Laterality: N/A;    GALLBLADDER SURGERY      GASTRIC RESTRICTION SURGERY      gastric sleeve in 2018    WISDOM TOOTH EXTRACTION       Family History   Problem Relation Age of Onset    Ovarian cancer Paternal Grandmother     Breast cancer Paternal Aunt     Ovarian cancer Paternal Aunt     Breast cancer Paternal Aunt     Uterine cancer Paternal Aunt     Colon cancer Neg Hx      Social History     Socioeconomic History    Marital status:    Tobacco Use    Smoking status: Never    Smokeless tobacco: Never   Substance and Sexual Activity    Alcohol use: Yes     Alcohol/week: 1.0 standard drink     Types: 1 Glasses of wine per week    Drug use: No    Sexual activity: Yes     Partners: Male     Birth control/protection: None     Social Determinants of Health     Financial Resource Strain: Low Risk     Difficulty of Paying Living Expenses: Not hard at all   Food Insecurity: No Food Insecurity    Worried About Running Out of Food in the Last Year: Never true    Ran Out of Food in the Last Year: Never true    Transportation Needs: No Transportation Needs    Lack of Transportation (Medical): No    Lack of Transportation (Non-Medical): No   Physical Activity: Insufficiently Active    Days of Exercise per Week: 3 days    Minutes of Exercise per Session: 30 min   Stress: Stress Concern Present    Feeling of Stress : To some extent   Social Connections: Unknown    Frequency of Communication with Friends and Family: More than three times a week    Frequency of Social Gatherings with Friends and Family: Three times a week    Active Member of Clubs or Organizations: No    Attends Club or Organization Meetings: Never    Marital Status:    Housing Stability: Low Risk     Unable to Pay for Housing in the Last Year: No    Number of Places Lived in the Last Year: 1    Unstable Housing in the Last Year: No       Review of Systems   Constitutional:  Negative for activity change and unexpected weight change.   HENT:  Negative for hearing loss, rhinorrhea and trouble swallowing.    Eyes:  Negative for discharge and visual disturbance.   Respiratory:  Negative for chest tightness and wheezing.    Cardiovascular:  Negative for chest pain and palpitations.   Gastrointestinal:  Negative for blood in stool, constipation, diarrhea and vomiting.   Endocrine: Negative for polydipsia and polyuria.   Genitourinary:  Negative for difficulty urinating, dysuria, hematuria and menstrual problem.   Musculoskeletal:  Negative for arthralgias, joint swelling and neck pain.   Neurological:  Positive for headaches. Negative for weakness.   Psychiatric/Behavioral:  Negative for confusion and dysphoric mood.        Objective:      Vitals:    11/28/22 1549   BP: 100/70   Pulse: 79   Temp: 98.1 °F (36.7 °C)       Physical Exam  Constitutional:       General: She is not in acute distress.     Appearance: Normal appearance. She is well-developed. She is not ill-appearing, toxic-appearing or diaphoretic.   HENT:      Head: Normocephalic and atraumatic.       Right Ear: External ear normal.      Left Ear: External ear normal.      Mouth/Throat:      Pharynx: No oropharyngeal exudate.   Eyes:      Conjunctiva/sclera: Conjunctivae normal.   Neck:      Thyroid: No thyromegaly.   Cardiovascular:      Rate and Rhythm: Normal rate and regular rhythm.      Heart sounds: Normal heart sounds. No murmur heard.    No friction rub. No gallop.   Pulmonary:      Effort: Pulmonary effort is normal. No respiratory distress.      Breath sounds: Normal breath sounds. No stridor. No wheezing, rhonchi or rales.   Abdominal:      General: Bowel sounds are normal. There is no distension.      Palpations: Abdomen is soft. There is no mass.      Tenderness: There is no abdominal tenderness. There is no guarding or rebound.      Hernia: No hernia is present.   Musculoskeletal:      Cervical back: Normal range of motion and neck supple.      Right lower leg: No edema.      Left lower leg: No edema.   Lymphadenopathy:      Cervical: No cervical adenopathy.   Skin:     Findings: No rash.   Neurological:      General: No focal deficit present.      Mental Status: She is alert and oriented to person, place, and time.   Psychiatric:         Mood and Affect: Mood normal.         Behavior: Behavior normal.       Assessment:       Problem List Items Addressed This Visit    None  Visit Diagnoses       Annual physical exam    -  Primary    Relevant Orders    CBC Auto Differential    COMPREHENSIVE METABOLIC PANEL    Lipid Panel    Mood disorder        Relevant Medications    busPIRone (BUSPAR) 5 MG Tab              Plan:       Darby was seen today for annual exam.    Diagnoses and all orders for this visit:    Annual physical exam  -     CBC Auto Differential; Future  -     COMPREHENSIVE METABOLIC PANEL; Future  -     Lipid Panel; Future    Mood disorder  -     busPIRone (BUSPAR) 5 MG Tab; Take 1 tablet (5 mg total) by mouth 2 (two) times daily.

## 2023-01-25 ENCOUNTER — PATIENT MESSAGE (OUTPATIENT)
Dept: FAMILY MEDICINE | Facility: CLINIC | Age: 38
End: 2023-01-25
Payer: COMMERCIAL

## 2023-02-15 ENCOUNTER — OFFICE VISIT (OUTPATIENT)
Dept: FAMILY MEDICINE | Facility: CLINIC | Age: 38
End: 2023-02-15
Payer: COMMERCIAL

## 2023-02-15 DIAGNOSIS — F39 MOOD DISORDER: Primary | ICD-10-CM

## 2023-02-15 PROCEDURE — 99214 PR OFFICE/OUTPT VISIT, EST, LEVL IV, 30-39 MIN: ICD-10-PCS | Mod: 95,,, | Performed by: FAMILY MEDICINE

## 2023-02-15 PROCEDURE — 99214 OFFICE O/P EST MOD 30 MIN: CPT | Mod: 95,,, | Performed by: FAMILY MEDICINE

## 2023-02-15 RX ORDER — CITALOPRAM 10 MG/1
10 TABLET ORAL DAILY
Qty: 30 TABLET | Refills: 11 | Status: SHIPPED | OUTPATIENT
Start: 2023-02-15 | End: 2023-02-15 | Stop reason: SDUPTHER

## 2023-02-15 RX ORDER — ALPRAZOLAM 0.25 MG/1
0.25 TABLET ORAL 3 TIMES DAILY
Qty: 30 TABLET | Refills: 0 | Status: SHIPPED | OUTPATIENT
Start: 2023-02-15 | End: 2023-04-04

## 2023-02-15 RX ORDER — CITALOPRAM 10 MG/1
10 TABLET ORAL DAILY
Qty: 30 TABLET | Refills: 11 | Status: SHIPPED | OUTPATIENT
Start: 2023-02-22 | End: 2024-02-15

## 2023-02-15 RX ORDER — DESVENLAFAXINE SUCCINATE 50 MG/1
50 TABLET, EXTENDED RELEASE ORAL DAILY
Qty: 14 TABLET | Refills: 0 | Status: SHIPPED | OUTPATIENT
Start: 2023-02-15 | End: 2023-02-15 | Stop reason: SDUPTHER

## 2023-02-15 RX ORDER — DESVENLAFAXINE SUCCINATE 50 MG/1
50 TABLET, EXTENDED RELEASE ORAL DAILY
Qty: 14 TABLET | Refills: 0 | Status: SHIPPED | OUTPATIENT
Start: 2023-02-15 | End: 2024-02-15

## 2023-02-15 NOTE — PROGRESS NOTES
Subjective:       Patient ID: Lauren K Damico is a 37 y.o. female.    Chief Complaint: No chief complaint on file.    The patient location is: [place]  The chief complaint leading to consultation is: medication    Visit type: audiovisual    Face to Face time with patient:   9 minutes of total time spent on the encounter, which includes face to face time and non-face to face time preparing to see the patient (eg, review of tests), Obtaining and/or reviewing separately obtained history, Documenting clinical information in the electronic or other health record, Independently interpreting results (not separately reported) and communicating results to the patient/family/caregiver, or Care coordination (not separately reported).         Each patient to whom he or she provides medical services by telemedicine is:  (1) informed of the relationship between the physician and patient and the respective role of any other health care provider with respect to management of the patient; and (2) notified that he or she may decline to receive medical services by telemedicine and may withdraw from such care at any time.    Notes:     37 year old female presents for follow up on her mood. She was having nausea, headaches, flushing, tinnitus. She has been trying the buspar at night. She states it makes her drowsy during the day so she only takes it at night. She is still having nights here she is not sleeping. She I still having pain up her jaw an dinto he head. She states she can feel it coming on. She is sleeping 405 hours at night. She feels like her mind won't stop racing. She gained weight on effexor. She was switche don lexapro, but was changed off of this. She state she is tired and feels withdrawn.     Review of Systems   Constitutional:  Negative for activity change and unexpected weight change.   HENT:  Negative for hearing loss, rhinorrhea and trouble swallowing.    Eyes:  Negative for discharge and visual disturbance.    Respiratory:  Negative for chest tightness and wheezing.    Cardiovascular:  Positive for palpitations. Negative for chest pain.   Gastrointestinal:  Negative for blood in stool, constipation, diarrhea and vomiting.   Endocrine: Negative for polydipsia and polyuria.   Genitourinary:  Negative for difficulty urinating, dysuria, hematuria and menstrual problem.   Musculoskeletal:  Negative for arthralgias, joint swelling and neck pain.   Neurological:  Positive for headaches. Negative for weakness.   Psychiatric/Behavioral:  Positive for dysphoric mood. Negative for confusion.        Objective:      Physical Exam    Assessment:       Problem List Items Addressed This Visit    None  Visit Diagnoses       Mood disorder    -  Primary    Relevant Medications    desvenlafaxine succinate (PRISTIQ) 50 MG Tb24    citalopram (CELEXA) 10 MG tablet (Start on 2/22/2023)    ALPRAZolam (XANAX) 0.25 MG tablet            Plan:       Diagnoses and all orders for this visit:    Mood disorder  -     desvenlafaxine succinate (PRISTIQ) 50 MG Tb24; Take 1 tablet (50 mg total) by mouth once daily.  -     citalopram (CELEXA) 10 MG tablet; Take 1 tablet (10 mg total) by mouth once daily.  -     ALPRAZolam (XANAX) 0.25 MG tablet; Take 1 tablet (0.25 mg total) by mouth 3 (three) times daily.  Wean pristiq to 50 mg for 1 week then start celexa 10 mg. Xanax prn or increase buspar to 10 mg at night.

## 2023-02-15 NOTE — PROGRESS NOTES
Answers submitted by the patient for this visit:  Review of Systems Questionnaire (Submitted on 2/15/2023)  activity change: No  unexpected weight change: No  neck pain: No  hearing loss: No  rhinorrhea: No  trouble swallowing: No  eye discharge: No  visual disturbance: No  chest tightness: No  wheezing: No  chest pain: No  palpitations: Yes  blood in stool: No  constipation: No  vomiting: No  diarrhea: No  polydipsia: No  polyuria: No  difficulty urinating: No  hematuria: No  menstrual problem: No  dysuria: No  joint swelling: No  arthralgias: No  headaches: Yes  weakness: No  confusion: No  dysphoric mood: Yes

## 2023-02-16 ENCOUNTER — PATIENT MESSAGE (OUTPATIENT)
Dept: HEMATOLOGY/ONCOLOGY | Facility: CLINIC | Age: 38
End: 2023-02-16
Payer: COMMERCIAL

## 2023-02-16 DIAGNOSIS — D50.8 IRON DEFICIENCY ANEMIA SECONDARY TO INADEQUATE DIETARY IRON INTAKE: Primary | ICD-10-CM

## 2023-03-08 ENCOUNTER — PATIENT MESSAGE (OUTPATIENT)
Dept: HEMATOLOGY/ONCOLOGY | Facility: CLINIC | Age: 38
End: 2023-03-08
Payer: COMMERCIAL

## 2023-03-08 ENCOUNTER — LAB VISIT (OUTPATIENT)
Dept: LAB | Facility: HOSPITAL | Age: 38
End: 2023-03-08
Attending: FAMILY MEDICINE
Payer: COMMERCIAL

## 2023-03-08 DIAGNOSIS — D50.8 IRON DEFICIENCY ANEMIA SECONDARY TO INADEQUATE DIETARY IRON INTAKE: ICD-10-CM

## 2023-03-08 PROCEDURE — 82728 ASSAY OF FERRITIN: CPT | Performed by: INTERNAL MEDICINE

## 2023-03-08 PROCEDURE — 80053 COMPREHEN METABOLIC PANEL: CPT | Performed by: INTERNAL MEDICINE

## 2023-03-08 PROCEDURE — 36415 COLL VENOUS BLD VENIPUNCTURE: CPT | Performed by: INTERNAL MEDICINE

## 2023-03-08 PROCEDURE — 85025 COMPLETE CBC W/AUTO DIFF WBC: CPT | Performed by: INTERNAL MEDICINE

## 2023-03-08 PROCEDURE — 84466 ASSAY OF TRANSFERRIN: CPT | Performed by: INTERNAL MEDICINE

## 2023-03-09 LAB
ALBUMIN SERPL BCP-MCNC: 3.7 G/DL (ref 3.5–5.2)
ALP SERPL-CCNC: 84 U/L (ref 55–135)
ALT SERPL W/O P-5'-P-CCNC: 28 U/L (ref 10–44)
ANION GAP SERPL CALC-SCNC: 8 MMOL/L (ref 8–16)
AST SERPL-CCNC: 20 U/L (ref 10–40)
BASOPHILS # BLD AUTO: 0.04 K/UL (ref 0–0.2)
BASOPHILS NFR BLD: 0.4 % (ref 0–1.9)
BILIRUB SERPL-MCNC: 0.2 MG/DL (ref 0.1–1)
BUN SERPL-MCNC: 12 MG/DL (ref 6–20)
CALCIUM SERPL-MCNC: 9.4 MG/DL (ref 8.7–10.5)
CHLORIDE SERPL-SCNC: 105 MMOL/L (ref 95–110)
CO2 SERPL-SCNC: 25 MMOL/L (ref 23–29)
CREAT SERPL-MCNC: 0.8 MG/DL (ref 0.5–1.4)
DIFFERENTIAL METHOD: ABNORMAL
EOSINOPHIL # BLD AUTO: 0.2 K/UL (ref 0–0.5)
EOSINOPHIL NFR BLD: 1.9 % (ref 0–8)
ERYTHROCYTE [DISTWIDTH] IN BLOOD BY AUTOMATED COUNT: 12.7 % (ref 11.5–14.5)
EST. GFR  (NO RACE VARIABLE): >60 ML/MIN/1.73 M^2
FERRITIN SERPL-MCNC: 12 NG/ML (ref 20–300)
GLUCOSE SERPL-MCNC: 89 MG/DL (ref 70–110)
HCT VFR BLD AUTO: 38.1 % (ref 37–48.5)
HGB BLD-MCNC: 12.1 G/DL (ref 12–16)
IMM GRANULOCYTES # BLD AUTO: 0.02 K/UL (ref 0–0.04)
IMM GRANULOCYTES NFR BLD AUTO: 0.2 % (ref 0–0.5)
IRON SERPL-MCNC: 48 UG/DL (ref 30–160)
LYMPHOCYTES # BLD AUTO: 2.6 K/UL (ref 1–4.8)
LYMPHOCYTES NFR BLD: 27.3 % (ref 18–48)
MCH RBC QN AUTO: 28.1 PG (ref 27–31)
MCHC RBC AUTO-ENTMCNC: 31.8 G/DL (ref 32–36)
MCV RBC AUTO: 89 FL (ref 82–98)
MONOCYTES # BLD AUTO: 0.6 K/UL (ref 0.3–1)
MONOCYTES NFR BLD: 5.8 % (ref 4–15)
NEUTROPHILS # BLD AUTO: 6.2 K/UL (ref 1.8–7.7)
NEUTROPHILS NFR BLD: 64.4 % (ref 38–73)
NRBC BLD-RTO: 0 /100 WBC
PLATELET # BLD AUTO: 422 K/UL (ref 150–450)
PMV BLD AUTO: 9.7 FL (ref 9.2–12.9)
POTASSIUM SERPL-SCNC: 4.1 MMOL/L (ref 3.5–5.1)
PROT SERPL-MCNC: 7 G/DL (ref 6–8.4)
RBC # BLD AUTO: 4.3 M/UL (ref 4–5.4)
SATURATED IRON: 13 % (ref 20–50)
SODIUM SERPL-SCNC: 138 MMOL/L (ref 136–145)
TOTAL IRON BINDING CAPACITY: 357 UG/DL (ref 250–450)
TRANSFERRIN SERPL-MCNC: 241 MG/DL (ref 200–375)
WBC # BLD AUTO: 9.54 K/UL (ref 3.9–12.7)

## 2023-03-10 ENCOUNTER — OFFICE VISIT (OUTPATIENT)
Dept: HEMATOLOGY/ONCOLOGY | Facility: CLINIC | Age: 38
End: 2023-03-10
Payer: COMMERCIAL

## 2023-03-10 VITALS
WEIGHT: 204.81 LBS | RESPIRATION RATE: 20 BRPM | BODY MASS INDEX: 40.21 KG/M2 | TEMPERATURE: 99 F | OXYGEN SATURATION: 96 % | HEIGHT: 60 IN | SYSTOLIC BLOOD PRESSURE: 112 MMHG | DIASTOLIC BLOOD PRESSURE: 77 MMHG | HEART RATE: 83 BPM

## 2023-03-10 DIAGNOSIS — Z98.84 H/O BARIATRIC SURGERY: ICD-10-CM

## 2023-03-10 DIAGNOSIS — D50.0 IRON DEFICIENCY ANEMIA DUE TO CHRONIC BLOOD LOSS: Primary | ICD-10-CM

## 2023-03-10 PROCEDURE — 3008F PR BODY MASS INDEX (BMI) DOCUMENTED: ICD-10-PCS | Mod: CPTII,S$GLB,, | Performed by: INTERNAL MEDICINE

## 2023-03-10 PROCEDURE — 1159F PR MEDICATION LIST DOCUMENTED IN MEDICAL RECORD: ICD-10-PCS | Mod: CPTII,S$GLB,, | Performed by: INTERNAL MEDICINE

## 2023-03-10 PROCEDURE — 99215 PR OFFICE/OUTPT VISIT, EST, LEVL V, 40-54 MIN: ICD-10-PCS | Mod: S$GLB,,, | Performed by: INTERNAL MEDICINE

## 2023-03-10 PROCEDURE — 99999 PR PBB SHADOW E&M-EST. PATIENT-LVL III: ICD-10-PCS | Mod: PBBFAC,,, | Performed by: INTERNAL MEDICINE

## 2023-03-10 PROCEDURE — 3078F PR MOST RECENT DIASTOLIC BLOOD PRESSURE < 80 MM HG: ICD-10-PCS | Mod: CPTII,S$GLB,, | Performed by: INTERNAL MEDICINE

## 2023-03-10 PROCEDURE — 1159F MED LIST DOCD IN RCRD: CPT | Mod: CPTII,S$GLB,, | Performed by: INTERNAL MEDICINE

## 2023-03-10 PROCEDURE — 3078F DIAST BP <80 MM HG: CPT | Mod: CPTII,S$GLB,, | Performed by: INTERNAL MEDICINE

## 2023-03-10 PROCEDURE — 3008F BODY MASS INDEX DOCD: CPT | Mod: CPTII,S$GLB,, | Performed by: INTERNAL MEDICINE

## 2023-03-10 PROCEDURE — 99999 PR PBB SHADOW E&M-EST. PATIENT-LVL III: CPT | Mod: PBBFAC,,, | Performed by: INTERNAL MEDICINE

## 2023-03-10 PROCEDURE — 3074F SYST BP LT 130 MM HG: CPT | Mod: CPTII,S$GLB,, | Performed by: INTERNAL MEDICINE

## 2023-03-10 PROCEDURE — 99215 OFFICE O/P EST HI 40 MIN: CPT | Mod: S$GLB,,, | Performed by: INTERNAL MEDICINE

## 2023-03-10 PROCEDURE — 3074F PR MOST RECENT SYSTOLIC BLOOD PRESSURE < 130 MM HG: ICD-10-PCS | Mod: CPTII,S$GLB,, | Performed by: INTERNAL MEDICINE

## 2023-03-10 RX ORDER — BUSPIRONE HYDROCHLORIDE 5 MG/1
5 TABLET ORAL 2 TIMES DAILY
COMMUNITY
Start: 2023-02-20 | End: 2023-12-11

## 2023-03-10 RX ORDER — SODIUM CHLORIDE 0.9 % (FLUSH) 0.9 %
10 SYRINGE (ML) INJECTION
Status: CANCELLED | OUTPATIENT
Start: 2023-03-10

## 2023-03-10 RX ORDER — SODIUM CHLORIDE 0.9 % (FLUSH) 0.9 %
10 SYRINGE (ML) INJECTION
Status: CANCELLED | OUTPATIENT
Start: 2023-04-15

## 2023-03-10 RX ORDER — HEPARIN 100 UNIT/ML
500 SYRINGE INTRAVENOUS
Status: CANCELLED | OUTPATIENT
Start: 2023-04-15

## 2023-03-10 RX ORDER — HEPARIN 100 UNIT/ML
500 SYRINGE INTRAVENOUS
Status: CANCELLED | OUTPATIENT
Start: 2023-03-10

## 2023-03-10 NOTE — PROGRESS NOTES
"Hematology and Bone Marrow Transplant  Follow-up Visit     03/10/2023    Reason For Visit: Iron Deficiency Anemia       History of Present Ilness:   Lauren K Damico (Darby) is a pleasant 37 y.o.female who presents for 3 month follow-up of iron deficiency anemia. Labs done in 2020 showed a significant anemia with hemoglobin in the 6's with a ferritin of 3. She was given 1 unit PRBC and injectafer x 2 doses. 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 States she is taking a Prenatal vitamin with 18 mg iron. She denies any vaginal bleeding and no abdominal pain. States she just feels "uncomfortable" Reports fatigue but states she has been working alot. She reports some trouble sleeping and some sob. She has been relating these issues to her advancing pregnancy. She complains of nausea and is taking Zofran and eating small meals.       Interval History:   Ms. Damico is a 37 year old female who again feels fatigued and run down.     Willing to repeat iv iron.      PAST MEDICAL HISTORY:   Past Medical History:   Diagnosis Date    Abnormal Pap smear 12    colpo/LEEP    Anemia     Anxiety     PCOS (polycystic ovarian syndrome)        PAST SURGICAL HISTORY:   Past Surgical History:   Procedure Laterality Date    CERVICAL BIOPSY  W/ LOOP ELECTRODE EXCISION  10-17-12     SECTION N/A 2020    Procedure:  SECTION;  Surgeon: Karo Zayas MD;  Location: [facility] L&D;  Service: OB/GYN;  Laterality: N/A;    GALLBLADDER SURGERY      GASTRIC RESTRICTION SURGERY      gastric sleeve in 2018    WISDOM TOOTH EXTRACTION         PAST SOCIAL HISTORY:   reports that she has never smoked. She has never used smokeless tobacco. She reports current alcohol use of about 1.0 standard drink per week. She reports that she does not use drugs.    FAMILY HISTORY:  Family History   Problem Relation Age of Onset    Ovarian cancer " Paternal Grandmother     Breast cancer Paternal Aunt     Ovarian cancer Paternal Aunt     Breast cancer Paternal Aunt     Uterine cancer Paternal Aunt     Colon cancer Neg Hx        CURRENT MEDICATIONS:   Current Outpatient Medications   Medication Sig    busPIRone (BUSPAR) 5 MG Tab Take 5 mg by mouth 2 (two) times daily.    citalopram (CELEXA) 10 MG tablet Take 1 tablet (10 mg total) by mouth once daily.    esomeprazole (NEXIUM) 20 MG capsule Take 20 mg by mouth 2 (two) times daily.    ALPRAZolam (XANAX) 0.25 MG tablet Take 1 tablet (0.25 mg total) by mouth 3 (three) times daily. (Patient not taking: Reported on 3/10/2023)    desvenlafaxine succinate (PRISTIQ) 50 MG Tb24 Take 1 tablet (50 mg total) by mouth once daily. (Patient not taking: Reported on 3/10/2023)    MAGNESIUM ORAL Take 100 mg by mouth once daily.    medroxyPROGESTERone (PROVERA) 10 MG tablet Take 1 tablet (10 mg total) by mouth once daily. (Patient not taking: Reported on 3/10/2023)    PNV no.95/ferrous fum/folic ac (PRENATAL ORAL) Take by mouth once daily. Ritual brand     No current facility-administered medications for this visit.     ALLERGIES:   Review of patient's allergies indicates:  No Known Allergies      Review of Systems:     Review of Systems   Constitutional:  Positive for fatigue. Negative for appetite change, chills, diaphoresis, fever and unexpected weight change.   HENT:   Negative for hearing loss, mouth sores, nosebleeds, sore throat, trouble swallowing and voice change.    Eyes:  Negative for eye problems and icterus.   Respiratory:  Positive for shortness of breath. Negative for chest tightness, cough, hemoptysis and wheezing.    Cardiovascular:  Negative for chest pain, leg swelling and palpitations.   Gastrointestinal:  Positive for nausea. Negative for abdominal distention, abdominal pain, blood in stool, diarrhea and vomiting.   Endocrine: Negative for hot flashes.   Genitourinary:  Negative for bladder incontinence,  difficulty urinating, dysuria, frequency and hematuria.    Musculoskeletal:  Negative for arthralgias, back pain, flank pain, gait problem, myalgias, neck pain and neck stiffness.   Skin:  Negative for itching, rash and wound.   Neurological:  Positive for light-headedness. Negative for dizziness, extremity weakness, gait problem, headaches, numbness, seizures and speech difficulty.   Hematological:  Negative for adenopathy. Does not bruise/bleed easily.   Psychiatric/Behavioral:  Negative for confusion, depression and sleep disturbance. The patient is not nervous/anxious.         Physical Exam:     Vitals:    03/10/23 0828   BP: 112/77   Pulse: 83   Resp: 20   Temp: 98.9 °F (37.2 °C)       Physical Exam  Constitutional:       General: She is not in acute distress.     Appearance: She is not diaphoretic.   HENT:      Mouth/Throat:      Pharynx: No oropharyngeal exudate.   Cardiovascular:      Rate and Rhythm: Normal rate and regular rhythm.      Heart sounds: Normal heart sounds.   Pulmonary:      Effort: Pulmonary effort is normal. No respiratory distress.      Breath sounds: Normal breath sounds.   Abdominal:      General: Bowel sounds are normal.      Palpations: Abdomen is soft.      Tenderness: There is no abdominal tenderness.   Skin:     General: Skin is warm and dry.      Findings: No rash.   Neurological:      Mental Status: She is alert and oriented to person, place, and time.   Psychiatric:         Mood and Affect: Affect normal.       ECOG Performance Status: (foot note - ECOG PS provided by Eastern Cooperative Oncology Group) 1 - Symptomatic but completely ambulatory    Karnofsky Performance Score:  80%- Normal Activity with Effort: Some Symptoms of Disease    Labs:   Lab Results   Component Value Date    WBC 9.54 03/08/2023    HGB 12.1 03/08/2023    HCT 38.1 03/08/2023     03/08/2023    CHOL 181 10/04/2017    TRIG 87 10/04/2017    HDL 40 10/04/2017    ALT 28 03/08/2023    AST 20 03/08/2023    NA  138 03/08/2023    K 4.1 03/08/2023     03/08/2023    CREATININE 0.8 03/08/2023    BUN 12 03/08/2023    CO2 25 03/08/2023    TSH 1.266 11/19/2019    GLUF 92 11/19/2019    HGBA1C 5.4 11/19/2019         Imaging: Previous imaging has been reviewed     Assessment and Plan:     Ms. Damico is a 37 year old female who is currently 36 weeks pregnant and has iron deficiency anemia.    Iron Deficiency Anemia  - denies vaginal bleeding or GI bleeding  --Iron numbers have again dropped  --Willing to get repeat iron dosing      Disposition: Return to clinic in 3 months with cbc and iron studies      Jes Jackson MD  Hematology/Bone Marrow Transplant  [facility]        BMT Chart Routing      Follow up with physician 3 months. 1. insurance auth and schedule iv iron x 2 2. see me in 3 months with cbc,cmp, iron, ferritin   Follow up with MORENO    Provider visit type    Infusion scheduling note    Injection scheduling note    Labs    Imaging    Pharmacy appointment    Other referrals

## 2023-04-14 ENCOUNTER — INFUSION (OUTPATIENT)
Dept: INFUSION THERAPY | Facility: HOSPITAL | Age: 38
End: 2023-04-14
Payer: COMMERCIAL

## 2023-04-14 VITALS
HEART RATE: 75 BPM | TEMPERATURE: 98 F | BODY MASS INDEX: 39.61 KG/M2 | OXYGEN SATURATION: 97 % | WEIGHT: 201.75 LBS | HEIGHT: 60 IN | SYSTOLIC BLOOD PRESSURE: 117 MMHG | DIASTOLIC BLOOD PRESSURE: 71 MMHG | RESPIRATION RATE: 18 BRPM

## 2023-04-14 DIAGNOSIS — D50.8 IRON DEFICIENCY ANEMIA SECONDARY TO INADEQUATE DIETARY IRON INTAKE: Primary | ICD-10-CM

## 2023-04-14 PROCEDURE — 63600175 PHARM REV CODE 636 W HCPCS: Mod: JZ,JG | Performed by: INTERNAL MEDICINE

## 2023-04-14 PROCEDURE — 96365 THER/PROPH/DIAG IV INF INIT: CPT

## 2023-04-14 PROCEDURE — 25000003 PHARM REV CODE 250: Performed by: INTERNAL MEDICINE

## 2023-04-14 RX ORDER — HEPARIN 100 UNIT/ML
500 SYRINGE INTRAVENOUS
Status: DISCONTINUED | OUTPATIENT
Start: 2023-04-14 | End: 2023-04-14 | Stop reason: HOSPADM

## 2023-04-14 RX ORDER — SODIUM CHLORIDE 0.9 % (FLUSH) 0.9 %
10 SYRINGE (ML) INJECTION
Status: DISCONTINUED | OUTPATIENT
Start: 2023-04-14 | End: 2023-04-14 | Stop reason: HOSPADM

## 2023-04-14 RX ADMIN — SODIUM CHLORIDE: 9 INJECTION, SOLUTION INTRAVENOUS at 01:04

## 2023-04-14 RX ADMIN — FERRIC CARBOXYMALTOSE INJECTION 750 MG: 50 INJECTION, SOLUTION INTRAVENOUS at 01:04

## 2023-04-14 NOTE — PLAN OF CARE
1425- Pt tolerated C1 Injectafer infusion  and 30 min post OBS well, no complications or side effects, POC and meds discussed with pt, pt aware of upcoming appts, pt knows to call MD with any questions or concerns. Pt ambulated off unit, no distress noted.

## 2023-04-28 ENCOUNTER — INFUSION (OUTPATIENT)
Dept: INFUSION THERAPY | Facility: HOSPITAL | Age: 38
End: 2023-04-28
Payer: COMMERCIAL

## 2023-04-28 VITALS
HEART RATE: 77 BPM | RESPIRATION RATE: 18 BRPM | DIASTOLIC BLOOD PRESSURE: 72 MMHG | SYSTOLIC BLOOD PRESSURE: 116 MMHG | OXYGEN SATURATION: 97 %

## 2023-04-28 DIAGNOSIS — D50.8 IRON DEFICIENCY ANEMIA SECONDARY TO INADEQUATE DIETARY IRON INTAKE: Primary | ICD-10-CM

## 2023-04-28 PROCEDURE — 96365 THER/PROPH/DIAG IV INF INIT: CPT

## 2023-04-28 PROCEDURE — 25000003 PHARM REV CODE 250: Performed by: INTERNAL MEDICINE

## 2023-04-28 PROCEDURE — 63600175 PHARM REV CODE 636 W HCPCS: Mod: JZ,JG | Performed by: INTERNAL MEDICINE

## 2023-04-28 RX ORDER — HEPARIN 100 UNIT/ML
500 SYRINGE INTRAVENOUS
Status: DISCONTINUED | OUTPATIENT
Start: 2023-04-28 | End: 2023-04-28 | Stop reason: HOSPADM

## 2023-04-28 RX ORDER — SODIUM CHLORIDE 0.9 % (FLUSH) 0.9 %
10 SYRINGE (ML) INJECTION
Status: DISCONTINUED | OUTPATIENT
Start: 2023-04-28 | End: 2023-04-28 | Stop reason: HOSPADM

## 2023-04-28 RX ADMIN — SODIUM CHLORIDE: 9 INJECTION, SOLUTION INTRAVENOUS at 09:04

## 2023-04-28 RX ADMIN — FERRIC CARBOXYMALTOSE INJECTION 750 MG: 50 INJECTION, SOLUTION INTRAVENOUS at 09:04

## 2023-04-28 NOTE — PLAN OF CARE
0962 Pt tolerated Injectafer infusion well today, no complaints or complications,. VSS through duration of treatment. Pt aware to call provider with any questions or concerns and is aware of upcoming appts. Pt ambulatory from clinic with steady gait, no distress noted.

## 2023-09-18 DIAGNOSIS — S46.811A STRAIN OF RIGHT TRAPEZIUS MUSCLE, INITIAL ENCOUNTER: Primary | ICD-10-CM

## 2023-09-18 RX ORDER — MELOXICAM 15 MG/1
15 TABLET ORAL DAILY
Qty: 30 TABLET | Refills: 2 | Status: CANCELLED | OUTPATIENT
Start: 2023-09-18

## 2023-09-18 RX ORDER — METHOCARBAMOL 500 MG/1
500 TABLET, FILM COATED ORAL 3 TIMES DAILY PRN
Qty: 40 TABLET | Refills: 0 | Status: SHIPPED | OUTPATIENT
Start: 2023-09-18 | End: 2023-10-01

## 2023-09-22 ENCOUNTER — OFFICE VISIT (OUTPATIENT)
Dept: SPORTS MEDICINE | Facility: CLINIC | Age: 38
End: 2023-09-22
Payer: COMMERCIAL

## 2023-09-22 VITALS
HEIGHT: 60 IN | BODY MASS INDEX: 39.46 KG/M2 | DIASTOLIC BLOOD PRESSURE: 70 MMHG | HEART RATE: 82 BPM | SYSTOLIC BLOOD PRESSURE: 103 MMHG | WEIGHT: 201 LBS

## 2023-09-22 DIAGNOSIS — R29.3 POOR POSTURE: ICD-10-CM

## 2023-09-22 DIAGNOSIS — M99.01 CERVICAL (NECK) REGION SOMATIC DYSFUNCTION: ICD-10-CM

## 2023-09-22 DIAGNOSIS — M99.02 SOMATIC DYSFUNCTION OF THORACIC REGION: ICD-10-CM

## 2023-09-22 DIAGNOSIS — M25.511 ACUTE PAIN OF RIGHT SHOULDER: Primary | ICD-10-CM

## 2023-09-22 DIAGNOSIS — M54.2 CERVICALGIA: ICD-10-CM

## 2023-09-22 DIAGNOSIS — M79.10 MYALGIA: ICD-10-CM

## 2023-09-22 DIAGNOSIS — M99.00 SOMATIC DYSFUNCTION OF HEAD REGION: ICD-10-CM

## 2023-09-22 DIAGNOSIS — M99.08 SOMATIC DYSFUNCTION OF RIB CAGE REGION: ICD-10-CM

## 2023-09-22 DIAGNOSIS — M99.07 UPPER EXTREMITY SOMATIC DYSFUNCTION: ICD-10-CM

## 2023-09-22 PROCEDURE — 1160F RVW MEDS BY RX/DR IN RCRD: CPT | Mod: CPTII,S$GLB,, | Performed by: NEUROMUSCULOSKELETAL MEDICINE & OMM

## 2023-09-22 PROCEDURE — 99999 PR PBB SHADOW E&M-EST. PATIENT-LVL III: CPT | Mod: PBBFAC,,, | Performed by: NEUROMUSCULOSKELETAL MEDICINE & OMM

## 2023-09-22 PROCEDURE — 98927 OSTEOPATH MANJ 5-6 REGIONS: CPT | Mod: S$GLB,,, | Performed by: NEUROMUSCULOSKELETAL MEDICINE & OMM

## 2023-09-22 PROCEDURE — 1159F MED LIST DOCD IN RCRD: CPT | Mod: CPTII,S$GLB,, | Performed by: NEUROMUSCULOSKELETAL MEDICINE & OMM

## 2023-09-22 PROCEDURE — 1160F PR REVIEW ALL MEDS BY PRESCRIBER/CLIN PHARMACIST DOCUMENTED: ICD-10-PCS | Mod: CPTII,S$GLB,, | Performed by: NEUROMUSCULOSKELETAL MEDICINE & OMM

## 2023-09-22 PROCEDURE — 3078F DIAST BP <80 MM HG: CPT | Mod: CPTII,S$GLB,, | Performed by: NEUROMUSCULOSKELETAL MEDICINE & OMM

## 2023-09-22 PROCEDURE — 99999 PR PBB SHADOW E&M-EST. PATIENT-LVL III: ICD-10-PCS | Mod: PBBFAC,,, | Performed by: NEUROMUSCULOSKELETAL MEDICINE & OMM

## 2023-09-22 PROCEDURE — 3078F PR MOST RECENT DIASTOLIC BLOOD PRESSURE < 80 MM HG: ICD-10-PCS | Mod: CPTII,S$GLB,, | Performed by: NEUROMUSCULOSKELETAL MEDICINE & OMM

## 2023-09-22 PROCEDURE — 3074F SYST BP LT 130 MM HG: CPT | Mod: CPTII,S$GLB,, | Performed by: NEUROMUSCULOSKELETAL MEDICINE & OMM

## 2023-09-22 PROCEDURE — 3008F PR BODY MASS INDEX (BMI) DOCUMENTED: ICD-10-PCS | Mod: CPTII,S$GLB,, | Performed by: NEUROMUSCULOSKELETAL MEDICINE & OMM

## 2023-09-22 PROCEDURE — 1159F PR MEDICATION LIST DOCUMENTED IN MEDICAL RECORD: ICD-10-PCS | Mod: CPTII,S$GLB,, | Performed by: NEUROMUSCULOSKELETAL MEDICINE & OMM

## 2023-09-22 PROCEDURE — 3074F PR MOST RECENT SYSTOLIC BLOOD PRESSURE < 130 MM HG: ICD-10-PCS | Mod: CPTII,S$GLB,, | Performed by: NEUROMUSCULOSKELETAL MEDICINE & OMM

## 2023-09-22 PROCEDURE — 99213 PR OFFICE/OUTPT VISIT, EST, LEVL III, 20-29 MIN: ICD-10-PCS | Mod: 25,S$GLB,, | Performed by: NEUROMUSCULOSKELETAL MEDICINE & OMM

## 2023-09-22 PROCEDURE — 99213 OFFICE O/P EST LOW 20 MIN: CPT | Mod: 25,S$GLB,, | Performed by: NEUROMUSCULOSKELETAL MEDICINE & OMM

## 2023-09-22 PROCEDURE — 98927 PR OSTEOPATHIC MANIP,5-6 BODY REGN: ICD-10-PCS | Mod: S$GLB,,, | Performed by: NEUROMUSCULOSKELETAL MEDICINE & OMM

## 2023-09-22 PROCEDURE — 3008F BODY MASS INDEX DOCD: CPT | Mod: CPTII,S$GLB,, | Performed by: NEUROMUSCULOSKELETAL MEDICINE & OMM

## 2023-09-22 NOTE — PROGRESS NOTES
Subjective:     Lauren K Damico    Chief Complaint   Patient presents with    Right Shoulder - Pain     HPI    Darby is a 38 y.o. female coming in today for jaw/headache pain, new onset neck and right shoulder pain. Since last visit the pain has Improved then deteriorated 9 days ago when she slept with her sick toddler on her right arm. The next evening pt experienced pain throughout the right scapula and upper trap. She was unable to turn her neck. The pain is better with rest and worse with turning head, first thing in the morning, massage. She has been taking robaxin for several days with mod relief. She has also been stretching and using heat. She noted sig improvement with OMT for her jaw pain/headache and is still compliant with her HEP. Pt. describes the pain as a 3/10 achy pain that does not radiate. There has not been any new a fall/injury/ or traumas since last visit.  Pt. denies any new musculoskeletal complaints at this time.     Office note from 22 reviewed    PAST MEDICAL HISTORY:   Past Medical History:   Diagnosis Date    Abnormal Pap smear 12    colpo/LEEP    Anemia     Anxiety     PCOS (polycystic ovarian syndrome)      PAST SURGICAL HISTORY:   Past Surgical History:   Procedure Laterality Date    CERVICAL BIOPSY  W/ LOOP ELECTRODE EXCISION  10-17-12     SECTION N/A 2020    Procedure:  SECTION;  Surgeon: Karo Zayas MD;  Location: [facility] L&D;  Service: OB/GYN;  Laterality: N/A;    GALLBLADDER SURGERY      GASTRIC RESTRICTION SURGERY      gastric sleeve in 2018    WISDOM TOOTH EXTRACTION       FAMILY HISTORY:   Family History   Problem Relation Age of Onset    Ovarian cancer Paternal Grandmother     Breast cancer Paternal Aunt     Ovarian cancer Paternal Aunt     Breast cancer Paternal Aunt     Uterine cancer Paternal Aunt     Colon cancer Neg Hx      SOCIAL HISTORY:   Social History     Socioeconomic History    Marital status:    Tobacco Use     Smoking status: Never    Smokeless tobacco: Never   Substance and Sexual Activity    Alcohol use: Yes     Alcohol/week: 1.0 standard drink of alcohol     Types: 1 Glasses of wine per week    Drug use: No    Sexual activity: Yes     Partners: Male     Birth control/protection: None     Social Determinants of Health     Financial Resource Strain: Low Risk  (3/9/2023)    Overall Financial Resource Strain (CARDIA)     Difficulty of Paying Living Expenses: Not hard at all   Food Insecurity: No Food Insecurity (3/9/2023)    Hunger Vital Sign     Worried About Running Out of Food in the Last Year: Never true     Ran Out of Food in the Last Year: Never true   Transportation Needs: No Transportation Needs (3/9/2023)    PRAPARE - Transportation     Lack of Transportation (Medical): No     Lack of Transportation (Non-Medical): No   Physical Activity: Insufficiently Active (3/9/2023)    Exercise Vital Sign     Days of Exercise per Week: 3 days     Minutes of Exercise per Session: 30 min   Stress: Stress Concern Present (3/9/2023)    [employer] - Occupational Stress Questionnaire     Feeling of Stress : To some extent   Social Connections: Unknown (3/9/2023)    Social Connection and Isolation Panel [NHANES]     Frequency of Communication with Friends and Family: More than three times a week     Frequency of Social Gatherings with Friends and Family: Three times a week     Active Member of Clubs or Organizations: No     Attends Club or Organization Meetings: Never     Marital Status:    Housing Stability: Low Risk  (3/9/2023)    Housing Stability Vital Sign     Unable to Pay for Housing in the Last Year: No     Number of Places Lived in the Last Year: 1     Unstable Housing in the Last Year: No     MEDICATIONS:   Current Outpatient Medications:     ALPRAZolam (XANAX) 0.25 MG tablet, TAKE ONE TABLET BY MOUTH THREE TIMES DAILY, Disp: 30 tablet, Rfl: 0    busPIRone (BUSPAR) 5 MG Tab, Take 5 mg by  mouth 2 (two) times daily., Disp: , Rfl:     citalopram (CELEXA) 10 MG tablet, Take 1 tablet (10 mg total) by mouth once daily., Disp: 30 tablet, Rfl: 11    esomeprazole (NEXIUM) 20 MG capsule, Take 20 mg by mouth 2 (two) times daily., Disp: , Rfl: 0    MAGNESIUM ORAL, Take 100 mg by mouth once daily., Disp: , Rfl:     methocarbamoL (ROBAXIN) 500 MG Tab, Take 1 tablet (500 mg total) by mouth 3 (three) times daily as needed (muscle spasms/tightness)., Disp: 40 tablet, Rfl: 0    PNV no.95/ferrous fum/folic ac (PRENATAL ORAL), Take by mouth once daily. Ritual brand, Disp: , Rfl:     desvenlafaxine succinate (PRISTIQ) 50 MG Tb24, Take 1 tablet (50 mg total) by mouth once daily. (Patient not taking: Reported on 3/10/2023), Disp: 14 tablet, Rfl: 0    medroxyPROGESTERone (PROVERA) 10 MG tablet, Take 1 tablet (10 mg total) by mouth once daily. (Patient not taking: Reported on 3/10/2023), Disp: 10 tablet, Rfl: 12  ALLERGIES: Review of patient's allergies indicates:  No Known Allergies    Objective:     VITAL SIGNS: /70   Pulse 82   Ht 5' (1.524 m)   Wt 91.2 kg (201 lb)   BMI 39.26 kg/m²    General    Vitals reviewed.  Constitutional: She is oriented to person, place, and time. She appears well-developed and well-nourished.   Neurological: She is alert and oriented to person, place, and time.   Psychiatric: She has a normal mood and affect. Her behavior is normal.            MUSCULOSKELETAL EXAM:  Shoulder: right SHOULDER  The affected shoulder is compared to the contralateral shoulder.    Observation:      SHOULDER  No ecchymosis, edema, or erythema throughout the shoulder girdle.  No sternal, clavicular, or acromial deformities bilaterally.  No atrophy of the pectorals, deltoids, supraspinatus, infraspinatus, or biceps bilaterally.  No asymmetry of shoulders bilaterally.    Posture:  Increased thoracic kyphosis and Posterior pelvis tilt with loss of lumbar lordosis    Tenderness:  No tenderness at the SC or AC  joint  No tenderness over the clavicle   No tenderness over biceps tendon or bicipital groove  No tenderness over subacromial space    Special Tests:  Empty can test - negative  Full can test - negative  Bear hug test - negative  Belly press test - negative  Resisted internal rotation - negative  Resisted external rotation - negative    Neer's test - negative  Hawkin's-Papi test - negative  Cross-arm test- negative    OJairos test - negative    Sulcus sign - none  AP load and shift laxity - none  Anterior apprehension test - negative  Posterior apprehension test - negative    Speed's test - negative  Yergason's test - negative    Neurovascular Exam:  Spurlings test - negative bialterally  Lhermittes test - negative  2+ radial pulses bilaterally  Sensation intact to light touch in the distal median, radial, and ulnar nerve distributions bilaterally.  2+/4 reflexes at triceps, biceps, and brachioradialis  Capillary refill intact <2 seconds in all digits bilaterally      Cervical Spine: bilateral cervical region    Observation:    Posture:  Anterior head carriage and Posterior pelvis tilt with loss of lumbar lordosis  No obvious shoulder un-leveling while standing.    No edema, erythema, or ecchymosis noted in cervical and thoraic spine regions.    No midline skin abnormalities.    No atrophy of upper limb musculature.  Gait: Non-antalgic     Tenderness:  No tenderness throughout the cervical spine upon spinous process palpation  No tenderness over the base of the occiput  No bony deformities or step-offs palpated.   + trigger point tenderness of the upper trapezius and levator scapulea musculature on right      Range of Motion (* = with pain):  CERVICAL SPINE  Full AROM in flexion, extension, sidebending, and rotation     SHOULDER  Active flexion to 180° on left and 180° on right*.(improved following OMT)   Active abduction to 180° on left and 180° on right*.(improved following OMT)   Active internal rotation to  T7 on left and T12 on right* (improved following OMT)   Active external rotation to T4 on left and T1 on right*.  (improved following OMT)   + right scapular dyskinesia    Strength Testing (* = with pain):  Sternocleidomastoid - 5/5 on left and 5/5 on right  Upper trapezius-  5/5 on left and 5/5 on right  Deltoid - 5/5 on left and 5/5 on right  Biceps - 5/5 on left and 5/5 on right  Triceps - 5/5 on left and 5/5 on right  Wrist extension - 5/5 on left and 5/5 on right  Wrist flexion - 5/5 on left and 5/5 on right   - 5/5 on left and 5/5 on right  Finger extension - 5/5 on left and 5/5 on right  Finger abduction - 5/5 on left and 5/5 on right    Structural Exam:  TART (Tissue texture abnormality, Asymmetry,  Restriction of motion and/or Tenderness) changes:    Head:   Cranial Rhythmic Impulse (CRI): restricted with Decreased amplitude    Strain Patterns: absent with jaw occlusion  Occipitoatlantal (OA) Joint: ES-left, R-right  Suture/Bone Restriction Side   Occipitomastoid (OM)  Present right   Parietal mastoid (PM) Absent    Petrojugular (PJ) Absent    Sphenosquamous pivot (SSP) Absent    Zygomaticotemporal (ZT) Absent    Zygomaticofrontal (ZF) Absent    Frontonasal Absent    [place] bone Absent    Parietal bone Absent    Frontal bone Absent      Muscle Tissue Texture Abnormality Side   Lateral pterygoid Absent    Medial pterygoid Absent       Cervical Spine   C1 TTA RIGHT   C2 Neutral   C3 Neutral   C4 Neutral   C5 Neutral   C6 Neutral   C7 ERS LEFT      Thoracic Spine   T1 ERS LEFT   T2 Neutral   T3 Neutral   T4 Neutral   T5 Neutral   T6 FRS LEFT   T7 ERS LEFT   T8 ERS LEFT   T9 Neutral   T10 Neutral   T11 Neutral   T12 Neutral     Rib cage: R6 external torsion on right    Upper extremity: right thoracic outlet TTA, Right levator scapular Herniated trigger point (HTP) fascial distortion, right supraclavulcure Herniated trigger point (HTP) fascial distortion , right anterior shoulder Trigger band (TB) fascial  distortion     Key   F= Flexed   E = Extended   R = Rotated   S = Sidebent   TTA = tissue texture abnormality     Neurovascular Exam:  Spurling's - negative on left and negative on right  Intact and symmetric radial pulses bilaterally.    Assessment:      Encounter Diagnoses   Name Primary?    Acute pain of right shoulder Yes    Cervicalgia     Poor posture     Myalgia     Somatic dysfunction of head region     Somatic dysfunction of thoracic region     Somatic dysfunction of rib cage region     Cervical (neck) region somatic dysfunction     Upper extremity somatic dysfunction         Plan:   1.Acute exacerbation of right neck pain and shoulder pain likely secondary to underlying poor posture and biomechanical restrictions of the upper kinetic chain with compensatory firing patterns. No cervical radicular symptoms appreciated on physical exam today.  - OMT performed again today to address associated biomechanical restrictions and HEP reviewed  - discussed proper posture practices at work desk  - continue over-the-counter NSAIDs as needed for pain control.  No significant relief with previous prescribed Robaxin.    2. OMT 5-6 regions. Oral consent obtained.  Reviewed benefits and potential side effects.   - OMT indicated today due to signs and symptoms as well as local and remote somatic dysfunction findings and their related neurokinetic, lymphatic, fascial and/or arteriovenous body connections.   - OMT techniques used: Myofascial Release, Muscle Energy, High Velocity Low Amplitude and Still's Technique   - Treatment was tolerated well. Improvement noted in segmental mobility post-treatment in dysfunctional regions. There were no adverse events and no complications immediately following treatment.     3. Reviewed with patient the following HEP:  Continue:  A) [place], upper trapezius, and levator scapulae stretches : hold neck sidebending stretch for 30 seconds in each plane, bilaterally, twice daily. Hand out  given.   B)  Supine Thoracic extension exercise: 10-15 reps, twice daily. Handout also given.   C) Cervicothoracic junction mobilization exercise: 10-15 reps, twice daily. Handout also given.   D) Seated anterior pelvic tilt exercise: rotate pelvis forward to sit on ischial tuberosities while maintaining neutral shoulder positioning. Repeat frequently throughout the day.   E) Quadratus lumborum self-stretch on all fours: hold stretch for 30 seconds, repeating 2-3 times on each side. Do stretch twice daily. Hand-out also given.   F) bilateral pterygoid self release exercise:  Perform as needed for jaw tension bilaterally.  Handout given.      The patient was taught a homegoing physical therapy regimen as described above. The patient demonstrated understanding of the exercises and proper technique of their execution.     4. Follow-up as needed if pain deteriorates or new issue arises    5. Patient agreeable to today's plan and all questions were answered    This note is dictated using the M*Modal Fluency Direct word recognition program. There are word recognition mistakes that are occasionally missed on review.

## 2023-09-25 RX ORDER — METHYLPREDNISOLONE 4 MG/1
TABLET ORAL
Qty: 21 EACH | Refills: 0 | Status: SHIPPED | OUTPATIENT
Start: 2023-09-25 | End: 2023-10-16

## 2023-12-02 DIAGNOSIS — F39 UNSPECIFIED MOOD (AFFECTIVE) DISORDER: ICD-10-CM

## 2023-12-02 NOTE — TELEPHONE ENCOUNTER
No care due was identified.  Health [facility] Embedded Care Due Messages. Reference number: [id number].   12/02/2023 11:02:44 AM CST

## 2023-12-11 RX ORDER — BUSPIRONE HYDROCHLORIDE 5 MG/1
TABLET ORAL
Qty: 60 TABLET | Refills: 11 | Status: SHIPPED | OUTPATIENT
Start: 2023-12-11

## 2024-02-07 ENCOUNTER — PATIENT MESSAGE (OUTPATIENT)
Dept: FAMILY MEDICINE | Facility: CLINIC | Age: 39
End: 2024-02-07
Payer: COMMERCIAL

## 2024-02-15 ENCOUNTER — OFFICE VISIT (OUTPATIENT)
Dept: FAMILY MEDICINE | Facility: CLINIC | Age: 39
End: 2024-02-15
Payer: COMMERCIAL

## 2024-02-15 ENCOUNTER — LAB VISIT (OUTPATIENT)
Dept: LAB | Facility: HOSPITAL | Age: 39
End: 2024-02-15
Attending: INTERNAL MEDICINE
Payer: COMMERCIAL

## 2024-02-15 VITALS
RESPIRATION RATE: 18 BRPM | OXYGEN SATURATION: 97 % | DIASTOLIC BLOOD PRESSURE: 62 MMHG | SYSTOLIC BLOOD PRESSURE: 106 MMHG | HEART RATE: 75 BPM | WEIGHT: 192.25 LBS | BODY MASS INDEX: 36.3 KG/M2 | TEMPERATURE: 98 F | HEIGHT: 61 IN

## 2024-02-15 DIAGNOSIS — F39 MOOD DISORDER: Primary | ICD-10-CM

## 2024-02-15 DIAGNOSIS — D50.0 IRON DEFICIENCY ANEMIA DUE TO CHRONIC BLOOD LOSS: ICD-10-CM

## 2024-02-15 LAB
ALBUMIN SERPL BCP-MCNC: 3.8 G/DL (ref 3.5–5.2)
ALP SERPL-CCNC: 60 U/L (ref 55–135)
ALT SERPL W/O P-5'-P-CCNC: 41 U/L (ref 10–44)
ANION GAP SERPL CALC-SCNC: 6 MMOL/L (ref 8–16)
AST SERPL-CCNC: 22 U/L (ref 10–40)
BASOPHILS # BLD AUTO: 0.06 K/UL (ref 0–0.2)
BASOPHILS NFR BLD: 1.1 % (ref 0–1.9)
BILIRUB SERPL-MCNC: 0.3 MG/DL (ref 0.1–1)
BUN SERPL-MCNC: 12 MG/DL (ref 6–20)
CALCIUM SERPL-MCNC: 8.6 MG/DL (ref 8.7–10.5)
CHLORIDE SERPL-SCNC: 107 MMOL/L (ref 95–110)
CO2 SERPL-SCNC: 26 MMOL/L (ref 23–29)
CREAT SERPL-MCNC: 0.7 MG/DL (ref 0.5–1.4)
DIFFERENTIAL METHOD BLD: NORMAL
EOSINOPHIL # BLD AUTO: 0.2 K/UL (ref 0–0.5)
EOSINOPHIL NFR BLD: 3.4 % (ref 0–8)
ERYTHROCYTE [DISTWIDTH] IN BLOOD BY AUTOMATED COUNT: 12.6 % (ref 11.5–14.5)
EST. GFR  (NO RACE VARIABLE): >60 ML/MIN/1.73 M^2
FERRITIN SERPL-MCNC: 101 NG/ML (ref 20–300)
GLUCOSE SERPL-MCNC: 103 MG/DL (ref 70–110)
HCT VFR BLD AUTO: 39.7 % (ref 37–48.5)
HGB BLD-MCNC: 12.7 G/DL (ref 12–16)
IMM GRANULOCYTES # BLD AUTO: 0.01 K/UL (ref 0–0.04)
IMM GRANULOCYTES NFR BLD AUTO: 0.2 % (ref 0–0.5)
IRON SERPL-MCNC: 75 UG/DL (ref 30–160)
LYMPHOCYTES # BLD AUTO: 2 K/UL (ref 1–4.8)
LYMPHOCYTES NFR BLD: 37.3 % (ref 18–48)
MCH RBC QN AUTO: 30.2 PG (ref 27–31)
MCHC RBC AUTO-ENTMCNC: 32 G/DL (ref 32–36)
MCV RBC AUTO: 94 FL (ref 82–98)
MONOCYTES # BLD AUTO: 0.5 K/UL (ref 0.3–1)
MONOCYTES NFR BLD: 8.4 % (ref 4–15)
NEUTROPHILS # BLD AUTO: 2.6 K/UL (ref 1.8–7.7)
NEUTROPHILS NFR BLD: 49.6 % (ref 38–73)
NRBC BLD-RTO: 0 /100 WBC
PLATELET # BLD AUTO: 325 K/UL (ref 150–450)
PMV BLD AUTO: 9.2 FL (ref 9.2–12.9)
POTASSIUM SERPL-SCNC: 4.5 MMOL/L (ref 3.5–5.1)
PROT SERPL-MCNC: 6.8 G/DL (ref 6–8.4)
RBC # BLD AUTO: 4.21 M/UL (ref 4–5.4)
SATURATED IRON: 30 % (ref 20–50)
SODIUM SERPL-SCNC: 139 MMOL/L (ref 136–145)
TOTAL IRON BINDING CAPACITY: 250 UG/DL (ref 250–450)
TRANSFERRIN SERPL-MCNC: 169 MG/DL (ref 200–375)
WBC # BLD AUTO: 5.33 K/UL (ref 3.9–12.7)

## 2024-02-15 PROCEDURE — 36415 COLL VENOUS BLD VENIPUNCTURE: CPT | Mod: PO | Performed by: INTERNAL MEDICINE

## 2024-02-15 PROCEDURE — 80053 COMPREHEN METABOLIC PANEL: CPT | Performed by: INTERNAL MEDICINE

## 2024-02-15 PROCEDURE — 82728 ASSAY OF FERRITIN: CPT | Performed by: INTERNAL MEDICINE

## 2024-02-15 PROCEDURE — 1159F MED LIST DOCD IN RCRD: CPT | Mod: CPTII,S$GLB,, | Performed by: FAMILY MEDICINE

## 2024-02-15 PROCEDURE — 1160F RVW MEDS BY RX/DR IN RCRD: CPT | Mod: CPTII,S$GLB,, | Performed by: FAMILY MEDICINE

## 2024-02-15 PROCEDURE — 3074F SYST BP LT 130 MM HG: CPT | Mod: CPTII,S$GLB,, | Performed by: FAMILY MEDICINE

## 2024-02-15 PROCEDURE — 83540 ASSAY OF IRON: CPT | Performed by: INTERNAL MEDICINE

## 2024-02-15 PROCEDURE — 3008F BODY MASS INDEX DOCD: CPT | Mod: CPTII,S$GLB,, | Performed by: FAMILY MEDICINE

## 2024-02-15 PROCEDURE — 99999 PR PBB SHADOW E&M-EST. PATIENT-LVL IV: CPT | Mod: PBBFAC,,, | Performed by: FAMILY MEDICINE

## 2024-02-15 PROCEDURE — 99214 OFFICE O/P EST MOD 30 MIN: CPT | Mod: S$GLB,,, | Performed by: FAMILY MEDICINE

## 2024-02-15 PROCEDURE — 85025 COMPLETE CBC W/AUTO DIFF WBC: CPT | Performed by: INTERNAL MEDICINE

## 2024-02-15 PROCEDURE — 3078F DIAST BP <80 MM HG: CPT | Mod: CPTII,S$GLB,, | Performed by: FAMILY MEDICINE

## 2024-02-15 RX ORDER — CITALOPRAM 20 MG/1
20 TABLET, FILM COATED ORAL DAILY
Qty: 30 TABLET | Refills: 11 | Status: SHIPPED | OUTPATIENT
Start: 2024-02-15 | End: 2025-02-14

## 2024-02-15 RX ORDER — CETIRIZINE HYDROCHLORIDE 10 MG/1
10 TABLET ORAL
COMMUNITY
Start: 2024-01-18

## 2024-02-15 NOTE — PROGRESS NOTES
Answers submitted by the patient for this visit:  Review of Systems Questionnaire (Submitted on 2/14/2024)  activity change: No  unexpected weight change: No  neck pain: No  hearing loss: No  rhinorrhea: No  trouble swallowing: No  eye discharge: No  visual disturbance: No  chest tightness: No  wheezing: No  chest pain: No  palpitations: No  blood in stool: No  constipation: No  vomiting: No  diarrhea: No  polydipsia: No  polyuria: No  difficulty urinating: No  hematuria: No  menstrual problem: No  dysuria: No  joint swelling: No  arthralgias: No  headaches: Yes  weakness: No  confusion: No  dysphoric mood: No

## 2024-02-15 NOTE — PROGRESS NOTES
Subjective     Patient ID: Lauren K Damico is a 38 y.o. female.    Chief Complaint: Annual Exam (Annual, medication renewal, ect )    38 year-old female presents for follow up. She states hse feels she is fine, but she states over the last 6 months sh was told that she has been irritable. She is not sure if it is extra things as her daughter started school. She feels she is over stimulated. She feels that she is overstimulated. She states she gets flushed, her ears start ringing and she gets nauseated. The buspar at night helps her at night. She is on celexa. She has put off paying bills as she can't take on doing things. She took a lot of xanax over the holidays. 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 She has not taken it in the last month.       Review of Systems   Constitutional:  Negative for activity change and unexpected weight change.   HENT:  Negative for hearing loss, rhinorrhea and trouble swallowing.    Eyes:  Negative for discharge and visual disturbance.   Respiratory:  Negative for chest tightness and wheezing.    Cardiovascular:  Negative for chest pain and palpitations.   Gastrointestinal:  Negative for blood in stool, constipation, diarrhea and vomiting.   Endocrine: Negative for polydipsia and polyuria.   Genitourinary:  Negative for difficulty urinating, dysuria, hematuria and menstrual problem.   Musculoskeletal:  Negative for arthralgias, joint swelling and neck pain.   Neurological:  Positive for headaches. Negative for weakness.   Psychiatric/Behavioral:  Negative for confusion and dysphoric mood.           Objective     Physical Exam       Assessment and Plan     1. Mood disorder  -     citalopram (CELEXA) 20 MG tablet; Take 1 tablet (20 mg total) by mouth once daily.  Dispense: 30 tablet; Refill: 11        Will increase celexa from 10  to 20 mg.          No follow-ups on file.

## 2024-02-21 DIAGNOSIS — F39 MOOD DISORDER: ICD-10-CM

## 2024-02-21 NOTE — TELEPHONE ENCOUNTER
Last Office Visit Info:   The patient's last visit with Marie Fang MD was on 2/15/2024.    The patient's last visit in current department was on 2/15/2024.

## 2024-02-21 NOTE — TELEPHONE ENCOUNTER
No care due was identified.  Health [facility] Embedded Care Due Messages. Reference number: [id number].   2/21/2024 3:09:56 PM CST

## 2024-02-27 RX ORDER — ALPRAZOLAM 0.25 MG/1
TABLET ORAL
Qty: 30 TABLET | Refills: 0 | Status: SHIPPED | OUTPATIENT
Start: 2024-02-27 | End: 2024-03-26

## 2024-03-22 ENCOUNTER — OFFICE VISIT (OUTPATIENT)
Dept: FAMILY MEDICINE | Facility: CLINIC | Age: 39
End: 2024-03-22
Payer: COMMERCIAL

## 2024-03-22 VITALS
BODY MASS INDEX: 36.21 KG/M2 | SYSTOLIC BLOOD PRESSURE: 102 MMHG | TEMPERATURE: 98 F | HEIGHT: 61 IN | DIASTOLIC BLOOD PRESSURE: 76 MMHG | OXYGEN SATURATION: 95 % | WEIGHT: 191.81 LBS | HEART RATE: 74 BPM

## 2024-03-22 DIAGNOSIS — G47.00 INSOMNIA, UNSPECIFIED TYPE: ICD-10-CM

## 2024-03-22 DIAGNOSIS — F39 MOOD DISORDER: Primary | ICD-10-CM

## 2024-03-22 PROCEDURE — 3008F BODY MASS INDEX DOCD: CPT | Mod: CPTII,S$GLB,, | Performed by: FAMILY MEDICINE

## 2024-03-22 PROCEDURE — 3078F DIAST BP <80 MM HG: CPT | Mod: CPTII,S$GLB,, | Performed by: FAMILY MEDICINE

## 2024-03-22 PROCEDURE — 99214 OFFICE O/P EST MOD 30 MIN: CPT | Mod: S$GLB,,, | Performed by: FAMILY MEDICINE

## 2024-03-22 PROCEDURE — 1159F MED LIST DOCD IN RCRD: CPT | Mod: CPTII,S$GLB,, | Performed by: FAMILY MEDICINE

## 2024-03-22 PROCEDURE — 3074F SYST BP LT 130 MM HG: CPT | Mod: CPTII,S$GLB,, | Performed by: FAMILY MEDICINE

## 2024-03-22 PROCEDURE — 99999 PR PBB SHADOW E&M-EST. PATIENT-LVL III: CPT | Mod: PBBFAC,,, | Performed by: FAMILY MEDICINE

## 2024-03-22 RX ORDER — BUPROPION HYDROCHLORIDE 75 MG/1
37.5 TABLET ORAL 2 TIMES DAILY
Qty: 30 TABLET | Refills: 11 | Status: SHIPPED | OUTPATIENT
Start: 2024-03-22 | End: 2025-03-22

## 2024-03-22 RX ORDER — TRAZODONE HYDROCHLORIDE 50 MG/1
50 TABLET ORAL NIGHTLY
Qty: 30 TABLET | Refills: 11 | Status: SHIPPED | OUTPATIENT
Start: 2024-03-22 | End: 2025-03-22

## 2024-03-22 NOTE — PROGRESS NOTES
Answers submitted by the patient for this visit:  Review of Systems Questionnaire (Submitted on 3/22/2024)  activity change: No  unexpected weight change: No  neck pain: No  hearing loss: No  rhinorrhea: No  trouble swallowing: No  eye discharge: No  visual disturbance: No  chest tightness: No  wheezing: No  chest pain: No  palpitations: No  blood in stool: No  constipation: No  vomiting: No  diarrhea: No  polydipsia: No  polyuria: No  difficulty urinating: No  hematuria: No  menstrual problem: No  dysuria: No  joint swelling: No  arthralgias: No  headaches: Yes  weakness: No  confusion: No  dysphoric mood: No

## 2024-03-22 NOTE — PROGRESS NOTES
Subjective     Patient ID: Lauren K Damico is a 38 y.o. female.    Chief Complaint: Follow-up (Changed medication during last visit. Following up. Doing good but still had 2 panic attacks. Doesn't feel as irritable) and Mood Disorder    38 year old female presents for follow up. She states she is not as irritable, but she has had 2 anxiety attacks. She states she can pinpoint what set it off. She had ringing, hands tingling and felt like she was going to vomit.she took a xanax and it resolved. 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 She knew it was going to be stressful. She states waking up in the morning is a lot. She snoozes 4 times. She takes 2 cups of coffee and an energy drink. She is drinking water and is taking a prenatal vitamin. She doesn't snore. She states it takes a while for her to decompress. She takes a buspar, melatonin and magnesium to go to sleep. She is sleeping 6 hours       Past Medical History:   Diagnosis Date    Abnormal Pap smear 12    colpo/LEEP    Anemia     Anxiety     PCOS (polycystic ovarian syndrome)       Past Surgical History:   Procedure Laterality Date    CERVICAL BIOPSY  W/ LOOP ELECTRODE EXCISION  10/17/2012     SECTION N/A 2020    Procedure:  SECTION;  Surgeon: Karo Zayas MD;  Location: [facility] L&[facility];  Service: OB/GYN;  Laterality: N/A;     SECTION  20    CHOLECYSTECTOMY  2015    GALLBLADDER SURGERY      GASTRIC RESTRICTION SURGERY      gastric sleeve in 2018    WISDOM TOOTH EXTRACTION       Family History   Problem Relation Age of Onset    Ovarian cancer Paternal Grandmother     Heart disease Paternal Grandmother     Breast cancer Paternal Aunt     Ovarian cancer Paternal Aunt     Breast cancer Paternal Aunt     Uterine cancer Paternal Aunt     Alcohol abuse Maternal Grandfather     Depression Maternal Grandmother     Colon cancer Neg Hx      Social History     Socioeconomic History    Marital status:     Tobacco Use    Smoking status: Never    Smokeless tobacco: Never   Substance and Sexual Activity    Alcohol use: Yes     Alcohol/week: 5.0 standard drinks of alcohol     Types: 5 Glasses of wine per week    Drug use: No    Sexual activity: Yes     Partners: Male     Birth control/protection: None     Social Determinants of Health     Financial Resource Strain: Medium Risk (2/14/2024)    Overall Financial Resource Strain (CARDIA)     Difficulty of Paying Living Expenses: Somewhat hard   Food Insecurity: No Food Insecurity (2/14/2024)    Hunger Vital Sign     Worried About Running Out of Food in the Last Year: Never true     Ran Out of Food in the Last Year: Never true   Transportation Needs: No Transportation Needs (2/14/2024)    PRAPARE - Transportation     Lack of Transportation (Medical): No     Lack of Transportation (Non-Medical): No   Physical Activity: Insufficiently Active (2/14/2024)    Exercise Vital Sign     Days of Exercise per Week: 3 days     Minutes of Exercise per Session: 20 min   Stress: Stress Concern Present (2/14/2024)    [employer] [place] [employer] - Occupational Stress Questionnaire     Feeling of Stress : Rather much   Social Connections: Unknown (2/14/2024)    Social Connection and Isolation Panel [NHANES]     Frequency of Communication with Friends and Family: More than three times a week     Frequency of Social Gatherings with Friends and Family: Once a week     Active Member of Clubs or Organizations: No     Attends Club or Organization Meetings: Never     Marital Status:    Housing Stability: Low Risk  (2/14/2024)    Housing Stability Vital Sign     Unable to Pay for Housing in the Last Year: No     Number of Places Lived in the Last Year: 1     Unstable Housing in the Last Year: No       Review of Systems   Constitutional:  Negative for activity change and unexpected weight change.   HENT:  Negative for hearing loss, rhinorrhea and trouble swallowing.    Eyes:   Negative for discharge and visual disturbance.   Respiratory:  Negative for chest tightness and wheezing.    Cardiovascular:  Negative for palpitations.   Gastrointestinal:  Negative for blood in stool, constipation and diarrhea.   Endocrine: Negative for polydipsia and polyuria.   Genitourinary:  Negative for difficulty urinating, dysuria, hematuria and menstrual problem.   Psychiatric/Behavioral:  Negative for confusion and dysphoric mood.           Objective     Physical Exam       Assessment and Plan     1. Mood disorder  -     buPROPion (WELLBUTRIN) 75 MG tablet; Take 0.5 tablets (37.5 mg total) by mouth 2 (two) times daily.  Dispense: 30 tablet; Refill: 11  Adding wellbutrin for energy and focus during the day    2. Insomnia, unspecified type  -     traZODone (DESYREL) 50 MG tablet; Take 1 tablet (50 mg total) by mouth every evening.  Dispense: 30 tablet; Refill: 11  Starting trazodone.                Follow up in about 1 month (around 4/22/2024).

## 2024-03-25 DIAGNOSIS — F39 MOOD DISORDER: ICD-10-CM

## 2024-03-25 NOTE — TELEPHONE ENCOUNTER
No care due was identified.  [facility] Embedded Care Due Messages. Reference number: [id number].   3/25/2024 9:23:12 AM CDT

## 2024-03-26 RX ORDER — ALPRAZOLAM 0.25 MG/1
TABLET ORAL
Qty: 30 TABLET | Refills: 2 | Status: SHIPPED | OUTPATIENT
Start: 2024-03-26 | End: 2024-05-29

## 2024-04-07 ENCOUNTER — HOSPITAL ENCOUNTER (EMERGENCY)
Facility: HOSPITAL | Age: 39
Discharge: HOME | End: 2024-04-07
Attending: EMERGENCY MEDICINE
Payer: COMMERCIAL

## 2024-04-07 VITALS
SYSTOLIC BLOOD PRESSURE: 120 MMHG | DIASTOLIC BLOOD PRESSURE: 67 MMHG | TEMPERATURE: 98 F | HEART RATE: 92 BPM | HEIGHT: 61 IN | WEIGHT: 191 LBS | BODY MASS INDEX: 36.06 KG/M2 | OXYGEN SATURATION: 100 % | RESPIRATION RATE: 18 BRPM

## 2024-04-07 DIAGNOSIS — R10.9 RIGHT FLANK PAIN: ICD-10-CM

## 2024-04-07 DIAGNOSIS — N39.0 URINARY TRACT INFECTION WITH HEMATURIA, SITE UNSPECIFIED: Primary | ICD-10-CM

## 2024-04-07 DIAGNOSIS — R31.9 URINARY TRACT INFECTION WITH HEMATURIA, SITE UNSPECIFIED: Primary | ICD-10-CM

## 2024-04-07 LAB
ALBUMIN SERPL BCP-MCNC: 3.9 G/DL (ref 3.5–5.2)
ALP SERPL-CCNC: 62 U/L (ref 55–135)
ALT SERPL W/O P-5'-P-CCNC: 11 U/L (ref 10–44)
ANION GAP SERPL CALC-SCNC: 11 MMOL/L (ref 8–16)
AST SERPL-CCNC: 11 U/L (ref 10–40)
B-HCG UR QL: NEGATIVE
BACTERIA #/AREA URNS HPF: ABNORMAL /HPF
BASOPHILS # BLD AUTO: 0.04 K/UL (ref 0–0.2)
BASOPHILS NFR BLD: 0.3 % (ref 0–1.9)
BILIRUB SERPL-MCNC: 0.3 MG/DL (ref 0.1–1)
BILIRUB UR QL STRIP: ABNORMAL
BUN SERPL-MCNC: 10 MG/DL (ref 6–20)
CALCIUM SERPL-MCNC: 9.3 MG/DL (ref 8.7–10.5)
CHLORIDE SERPL-SCNC: 106 MMOL/L (ref 95–110)
CLARITY UR: ABNORMAL
CO2 SERPL-SCNC: 24 MMOL/L (ref 23–29)
COLOR UR: ABNORMAL
CREAT SERPL-MCNC: 0.7 MG/DL (ref 0.5–1.4)
CTP QC/QA: YES
DIFFERENTIAL METHOD BLD: ABNORMAL
EOSINOPHIL # BLD AUTO: 0.2 K/UL (ref 0–0.5)
EOSINOPHIL NFR BLD: 1.4 % (ref 0–8)
ERYTHROCYTE [DISTWIDTH] IN BLOOD BY AUTOMATED COUNT: 12.2 % (ref 11.5–14.5)
EST. GFR  (NO RACE VARIABLE): >60 ML/MIN/1.73 M^2
GLUCOSE SERPL-MCNC: 94 MG/DL (ref 70–110)
GLUCOSE UR QL STRIP: ABNORMAL
HCT VFR BLD AUTO: 38.9 % (ref 37–48.5)
HGB BLD-MCNC: 12.8 G/DL (ref 12–16)
HGB UR QL STRIP: ABNORMAL
IMM GRANULOCYTES # BLD AUTO: 0.04 K/UL (ref 0–0.04)
IMM GRANULOCYTES NFR BLD AUTO: 0.3 % (ref 0–0.5)
KETONES UR QL STRIP: ABNORMAL
LACTATE SERPL-SCNC: 1.1 MMOL/L (ref 0.5–2.2)
LEUKOCYTE ESTERASE UR QL STRIP: ABNORMAL
LYMPHOCYTES # BLD AUTO: 1.5 K/UL (ref 1–4.8)
LYMPHOCYTES NFR BLD: 12.1 % (ref 18–48)
MCH RBC QN AUTO: 29.5 PG (ref 27–31)
MCHC RBC AUTO-ENTMCNC: 32.9 G/DL (ref 32–36)
MCV RBC AUTO: 90 FL (ref 82–98)
MICROSCOPIC COMMENT: ABNORMAL
MONOCYTES # BLD AUTO: 0.8 K/UL (ref 0.3–1)
MONOCYTES NFR BLD: 6.5 % (ref 4–15)
NEUTROPHILS # BLD AUTO: 9.9 K/UL (ref 1.8–7.7)
NEUTROPHILS NFR BLD: 79.4 % (ref 38–73)
NITRITE UR QL STRIP: ABNORMAL
NRBC BLD-RTO: 0 /100 WBC
PH UR STRIP: ABNORMAL [PH] (ref 5–8)
PLATELET # BLD AUTO: 269 K/UL (ref 150–450)
PMV BLD AUTO: 8.9 FL (ref 9.2–12.9)
POTASSIUM SERPL-SCNC: 3.6 MMOL/L (ref 3.5–5.1)
PROT SERPL-MCNC: 7.1 G/DL (ref 6–8.4)
PROT UR QL STRIP: ABNORMAL
RBC # BLD AUTO: 4.34 M/UL (ref 4–5.4)
RBC #/AREA URNS HPF: 55 /HPF (ref 0–4)
SODIUM SERPL-SCNC: 141 MMOL/L (ref 136–145)
SP GR UR STRIP: ABNORMAL (ref 1–1.03)
SQUAMOUS #/AREA URNS HPF: 3 /HPF
URN SPEC COLLECT METH UR: ABNORMAL
UROBILINOGEN UR STRIP-ACNC: ABNORMAL EU/DL
WBC # BLD AUTO: 12.4 K/UL (ref 3.9–12.7)
WBC #/AREA URNS HPF: 11 /HPF (ref 0–5)
WBC CLUMPS URNS QL MICRO: ABNORMAL

## 2024-04-07 PROCEDURE — 85025 COMPLETE CBC W/AUTO DIFF WBC: CPT | Performed by: NURSE PRACTITIONER

## 2024-04-07 PROCEDURE — 63600175 PHARM REV CODE 636 W HCPCS: Performed by: NURSE PRACTITIONER

## 2024-04-07 PROCEDURE — 80053 COMPREHEN METABOLIC PANEL: CPT | Performed by: NURSE PRACTITIONER

## 2024-04-07 PROCEDURE — 87086 URINE CULTURE/COLONY COUNT: CPT | Performed by: NURSE PRACTITIONER

## 2024-04-07 PROCEDURE — 25000003 PHARM REV CODE 250: Performed by: NURSE PRACTITIONER

## 2024-04-07 PROCEDURE — 99285 EMERGENCY DEPT VISIT HI MDM: CPT | Mod: 25

## 2024-04-07 PROCEDURE — 81000 URINALYSIS NONAUTO W/SCOPE: CPT | Performed by: NURSE PRACTITIONER

## 2024-04-07 PROCEDURE — 83605 ASSAY OF LACTIC ACID: CPT | Performed by: NURSE PRACTITIONER

## 2024-04-07 PROCEDURE — 96365 THER/PROPH/DIAG IV INF INIT: CPT

## 2024-04-07 PROCEDURE — 87040 BLOOD CULTURE FOR BACTERIA: CPT | Mod: 59 | Performed by: NURSE PRACTITIONER

## 2024-04-07 PROCEDURE — 96375 TX/PRO/DX INJ NEW DRUG ADDON: CPT

## 2024-04-07 PROCEDURE — 81025 URINE PREGNANCY TEST: CPT | Performed by: NURSE PRACTITIONER

## 2024-04-07 PROCEDURE — 25500020 PHARM REV CODE 255: Performed by: NURSE PRACTITIONER

## 2024-04-07 RX ORDER — CEPHALEXIN 500 MG/1
500 CAPSULE ORAL EVERY 8 HOURS
Qty: 30 CAPSULE | Refills: 0 | Status: SHIPPED | OUTPATIENT
Start: 2024-04-07 | End: 2024-04-17

## 2024-04-07 RX ORDER — IBUPROFEN 600 MG/1
600 TABLET ORAL EVERY 6 HOURS PRN
Qty: 20 TABLET | Refills: 0 | Status: SHIPPED | OUTPATIENT
Start: 2024-04-07

## 2024-04-07 RX ORDER — ONDANSETRON HYDROCHLORIDE 2 MG/ML
4 INJECTION, SOLUTION INTRAVENOUS
Status: COMPLETED | OUTPATIENT
Start: 2024-04-07 | End: 2024-04-07

## 2024-04-07 RX ORDER — PHENAZOPYRIDINE HYDROCHLORIDE 100 MG/1
100 TABLET, FILM COATED ORAL 3 TIMES DAILY PRN
Qty: 9 TABLET | Refills: 0 | Status: SHIPPED | OUTPATIENT
Start: 2024-04-07 | End: 2024-04-17

## 2024-04-07 RX ORDER — KETOROLAC TROMETHAMINE 30 MG/ML
15 INJECTION, SOLUTION INTRAMUSCULAR; INTRAVENOUS
Status: COMPLETED | OUTPATIENT
Start: 2024-04-07 | End: 2024-04-07

## 2024-04-07 RX ORDER — ONDANSETRON 4 MG/1
4 TABLET, FILM COATED ORAL EVERY 8 HOURS PRN
Qty: 12 TABLET | Refills: 0 | Status: SHIPPED | OUTPATIENT
Start: 2024-04-07

## 2024-04-07 RX ADMIN — IOHEXOL 75 ML: 350 INJECTION, SOLUTION INTRAVENOUS at 06:04

## 2024-04-07 RX ADMIN — ONDANSETRON 4 MG: 2 INJECTION INTRAMUSCULAR; INTRAVENOUS at 05:04

## 2024-04-07 RX ADMIN — SODIUM CHLORIDE 1000 ML: 9 INJECTION, SOLUTION INTRAVENOUS at 06:04

## 2024-04-07 RX ADMIN — CEFTRIAXONE 2 G: 2 INJECTION, POWDER, FOR SOLUTION INTRAMUSCULAR; INTRAVENOUS at 06:04

## 2024-04-07 RX ADMIN — KETOROLAC TROMETHAMINE 15 MG: 30 INJECTION, SOLUTION INTRAMUSCULAR at 05:04

## 2024-04-07 NOTE — ED PROVIDER NOTES
Encounter Date: 2024       History     Chief Complaint   Patient presents with    Urinary Tract Infection     Pt chief complaint is a UTI, Pt states has ahd a uti in the past and feels like she may have another, pt is having painful burning sensation when urinating for last 2 days.      38-year-old female with a history of anemia, pneumonia, bariatric surgery, and others presenting for evaluation of dysuria, urinary frequency, urgency, fever, and right flank pain.  States that she 1st developed UTI symptoms about 2 days ago and took leftover Macrobid and left over Augmentin in an attempt to treat her symptoms at home.  Last night she awoke with flank pain and fevers.  Also reports some nausea.  Reports that home temperature was 101.4° F. She took ibuprofen around 2:00 a.m.  No history of pyelonephritis or kidney stones.  Denies URI symptoms, chest pain, shortness of breath, vaginal discharge, vaginal bleeding, concern for STDs.    The history is provided by the patient. No  was used.     Review of patient's allergies indicates:  No Known Allergies  Past Medical History:   Diagnosis Date    Abnormal Pap smear 12    colpo/LEEP    Anemia     Anxiety     PCOS (polycystic ovarian syndrome)      Past Surgical History:   Procedure Laterality Date    CERVICAL BIOPSY  W/ LOOP ELECTRODE EXCISION  10/17/2012     SECTION N/A 2020    Procedure:  SECTION;  Surgeon: Karo Zayas MD;  Location: [facility];  Service: OB/GYN;  Laterality: N/A;     SECTION  20    CHOLECYSTECTOMY  2015    GALLBLADDER SURGERY      GASTRIC RESTRICTION SURGERY      gastric sleeve in 2018    WISDOM TOOTH EXTRACTION       Family History   Problem Relation Age of Onset    Ovarian cancer Paternal Grandmother     Heart disease Paternal Grandmother     Breast cancer Paternal Aunt     Ovarian cancer Paternal Aunt     Breast cancer Paternal Aunt     Uterine cancer Paternal Aunt     Alcohol  abuse Maternal Grandfather     Depression Maternal Grandmother     Colon cancer Neg Hx      Social History     Tobacco Use    Smoking status: Never    Smokeless tobacco: Never   Substance Use Topics    Alcohol use: Yes     Alcohol/week: 5.0 standard drinks of alcohol     Types: 5 Glasses of wine per week    Drug use: No     Review of Systems   Constitutional:  Positive for fever. Negative for chills and fatigue.   HENT:  Negative for congestion, ear pain, nosebleeds, postnasal drip, rhinorrhea, sinus pressure and sore throat.    Eyes:  Negative for photophobia and pain.   Respiratory:  Negative for apnea, cough, choking, chest tightness, shortness of breath, wheezing and stridor.    Cardiovascular:  Negative for chest pain, palpitations and leg swelling.   Gastrointestinal:  Positive for nausea. Negative for abdominal pain, constipation, diarrhea and vomiting.   Genitourinary:  Positive for dysuria, flank pain, frequency, pelvic pain and urgency. Negative for hematuria and vaginal discharge.   Musculoskeletal:  Negative for arthralgias, back pain, gait problem and neck pain.   Skin:  Negative for color change, pallor, rash and wound.   Neurological:  Negative for dizziness, seizures, syncope, facial asymmetry, light-headedness and headaches.   Hematological:  Negative for adenopathy.   Psychiatric/Behavioral:  Negative for confusion. The patient is not nervous/anxious.        Physical Exam     Initial Vitals [04/07/24 0433]   BP Pulse Resp Temp SpO2   119/65 93 18 97.9 °F (36.6 °C) 98 %      MAP       --         Physical Exam    Nursing note and vitals reviewed.  Constitutional: She appears well-developed and well-nourished. She is not diaphoretic. No distress.   HENT:   Head: Normocephalic and atraumatic.   Right Ear: External ear normal.   Left Ear: External ear normal.   Nose: Nose normal.   Eyes: Conjunctivae and EOM are normal. Right eye exhibits no discharge. Left eye exhibits no discharge.   Neck: Neck  supple. No tracheal deviation present.   Normal range of motion.  Cardiovascular:  Normal rate.           Pulmonary/Chest: No stridor. No respiratory distress.   Abdominal: Abdomen is soft. She exhibits no distension. There is no abdominal tenderness.   Musculoskeletal:         General: No tenderness. Normal range of motion.      Cervical back: Normal range of motion and neck supple.     Neurological: She is alert and oriented to person, place, and time. She has normal strength. No cranial nerve deficit or sensory deficit.   Skin: Skin is warm and dry.   Psychiatric: She has a normal mood and affect. Her behavior is normal. Judgment and thought content normal.         ED Course   Procedures  Labs Reviewed   URINALYSIS, REFLEX TO URINE CULTURE - Abnormal; Notable for the following components:       Result Value    Color, UA Orange (*)     Appearance, UA Cloudy (*)     All other components within normal limits    Narrative:     Specimen Source->Urine   URINALYSIS MICROSCOPIC - Abnormal; Notable for the following components:    RBC, UA 55 (*)     WBC, UA 11 (*)     WBC Clumps, UA Moderate (*)     Bacteria Few (*)     All other components within normal limits    Narrative:     Specimen Source->Urine   CBC W/ AUTO DIFFERENTIAL - Abnormal; Notable for the following components:    MPV 8.9 (*)     Gran # (ANC) 9.9 (*)     Gran % 79.4 (*)     Lymph % 12.1 (*)     All other components within normal limits   CULTURE, URINE   CULTURE, BLOOD   CULTURE, BLOOD   LACTIC ACID, PLASMA   COMPREHENSIVE METABOLIC PANEL   POCT URINE PREGNANCY          Imaging Results              CT Abdomen Pelvis  Without Contrast (In process)  Result time 04/07/24 06:08:58                     Medications   sodium chloride 0.9% bolus 1,000 mL 1,000 mL (1,000 mLs Intravenous New Bag 4/7/24 0606)   cefTRIAXone (ROCEPHIN) 2 g in dextrose 5 % in water (D5W) 100 mL IVPB (MB+) (2 g Intravenous New Bag 4/7/24 0606)   ketorolac injection 15 mg (15 mg  Intravenous Given 4/7/24 0545)   ondansetron injection 4 mg (4 mg Intravenous Given 4/7/24 0544)   iohexoL (OMNIPAQUE 350) injection 75 mL (75 mLs Intravenous Given 4/7/24 0600)     Medical Decision Making  Differential diagnosis includes but not limited to cystitis, pyelonephritis, ureterolithiasis, sepsis, others      Urinalysis with evidence of infection.  Urine culture in process.  Although patient is afebrile and vitals are within normal limits at this time I ordered blood cultures and Rocephin given symptoms and temperature at home.  CBC with elevated ANC but no leukocytosis.  Case signed out to Olvin Kinney MD at the end of my shift pending remaining workup and disposition.    Amount and/or Complexity of Data Reviewed  Labs: ordered. Decision-making details documented in ED Course.  Radiology: ordered. Decision-making details documented in ED Course.    Risk  Prescription drug management.                                      Clinical Impression:  Final diagnoses:  [N39.0, R31.9] Urinary tract infection with hematuria, site unspecified (Primary)  [R10.9] Right flank pain                 Trell Knowles, NP  04/07/24 2116

## 2024-04-07 NOTE — DISCHARGE INSTRUCTIONS
Please follow-up with your regular doctor this week.  Take all medications as prescribed.  Return if you get worse or if new symptoms develop, especially any weakness, intractable vomiting, fever, chills.

## 2024-04-09 LAB — BACTERIA UR CULT: NORMAL

## 2024-04-11 LAB
BACTERIA BLD CULT: NORMAL
BACTERIA BLD CULT: NORMAL

## 2024-04-17 NOTE — TELEPHONE ENCOUNTER
----- Message from Susan Renee sent at 8/3/2018  7:10 AM CDT -----  Contact: [employer] request  Message     Appointment Request From: Lauren Damico    With Provider: Other - (see comments)    Would Accept With:Request to see a new provider    Preferred Date Range: Any date 8/1/2018 or later    Preferred Times: Any    Reason for visit: Request an Appt    Comments:  I am interested in scheduling an appointment with either Dr. Duran or Dr. Hearn (both OB/GYN) as a new patient       Left message for patient      no

## 2024-05-08 ENCOUNTER — PATIENT MESSAGE (OUTPATIENT)
Dept: FAMILY MEDICINE | Facility: CLINIC | Age: 39
End: 2024-05-08
Payer: COMMERCIAL

## 2024-05-14 ENCOUNTER — OFFICE VISIT (OUTPATIENT)
Dept: OBSTETRICS AND GYNECOLOGY | Facility: CLINIC | Age: 39
End: 2024-05-14
Payer: COMMERCIAL

## 2024-05-14 VITALS
BODY MASS INDEX: 34.8 KG/M2 | WEIGHT: 184.31 LBS | SYSTOLIC BLOOD PRESSURE: 112 MMHG | HEIGHT: 61 IN | DIASTOLIC BLOOD PRESSURE: 72 MMHG

## 2024-05-14 DIAGNOSIS — Z31.41 FERTILITY TESTING: ICD-10-CM

## 2024-05-14 DIAGNOSIS — Z01.419 ENCOUNTER FOR WELL WOMAN EXAM WITH ROUTINE GYNECOLOGICAL EXAM: Primary | ICD-10-CM

## 2024-05-14 PROCEDURE — 99999 PR PBB SHADOW E&M-EST. PATIENT-LVL III: CPT | Mod: PBBFAC,,, | Performed by: OBSTETRICS & GYNECOLOGY

## 2024-05-14 PROCEDURE — 1159F MED LIST DOCD IN RCRD: CPT | Mod: CPTII,S$GLB,, | Performed by: OBSTETRICS & GYNECOLOGY

## 2024-05-14 PROCEDURE — 87624 HPV HI-RISK TYP POOLED RSLT: CPT | Performed by: OBSTETRICS & GYNECOLOGY

## 2024-05-14 PROCEDURE — 3074F SYST BP LT 130 MM HG: CPT | Mod: CPTII,S$GLB,, | Performed by: OBSTETRICS & GYNECOLOGY

## 2024-05-14 PROCEDURE — 3008F BODY MASS INDEX DOCD: CPT | Mod: CPTII,S$GLB,, | Performed by: OBSTETRICS & GYNECOLOGY

## 2024-05-14 PROCEDURE — 88175 CYTOPATH C/V AUTO FLUID REDO: CPT | Performed by: OBSTETRICS & GYNECOLOGY

## 2024-05-14 PROCEDURE — 99395 PREV VISIT EST AGE 18-39: CPT | Mod: S$GLB,,, | Performed by: OBSTETRICS & GYNECOLOGY

## 2024-05-14 PROCEDURE — 3078F DIAST BP <80 MM HG: CPT | Mod: CPTII,S$GLB,, | Performed by: OBSTETRICS & GYNECOLOGY

## 2024-05-14 NOTE — PROGRESS NOTES
History & Physical  Gynecology      SUBJECTIVE:     Chief Complaint: Gynecologic Exam         History of Present Illness:    Lauren K Damico is a 39 y.o. female  here for annual routine Pap and checkup. Patient's last menstrual period was 2024..  She has no unusual complaints.      She describes her periods as irregular but starting to regulate since losing weight.  denies break through bleeding.   denies vaginal itching or irritation.  denies vaginal discharge.    She is sexually active with 1 partner  She uses condoms for contraception.    History of abnormal pap: yes, LEEP in , normal since then  Last Pap: (2021), no HPV done  Last MMG: age 21- fibrocystic breasts  Last Colonoscopy:  6-7 years ago- done with EGD for acid reflux.        Review of patient's allergies indicates:  No Known Allergies    Past Medical History:   Diagnosis Date    Abnormal Pap smear 12    colpo/LEEP    Anemia     Anxiety     PCOS (polycystic ovarian syndrome)      Past Surgical History:   Procedure Laterality Date    CERVICAL BIOPSY  W/ LOOP ELECTRODE EXCISION  10/17/2012     SECTION N/A 2020    Procedure:  SECTION;  Surgeon: Karo Zayas MD;  Location: [facility] L&D;  Service: OB/GYN;  Laterality: N/A;     SECTION  20    CHOLECYSTECTOMY  2015    GALLBLADDER SURGERY      GASTRIC RESTRICTION SURGERY      gastric sleeve in 2018    WISDOM TOOTH EXTRACTION       OB History          1    Para   1    Term   1            AB        Living   1         SAB        IAB        Ectopic        Multiple   0    Live Births   1               Family History   Problem Relation Name Age of Onset    Ovarian cancer Paternal Grandmother Beverly Damico     Heart disease Paternal Grandmother Beverly Damico     Breast cancer Paternal Aunt      Ovarian cancer Paternal Aunt      Breast cancer Paternal Aunt      Uterine cancer Paternal Aunt      Alcohol abuse Maternal Grandfather  Brenden Nguyen     Depression Maternal Grandmother Annika Nguyen     Colon cancer Neg Hx       Social History     Tobacco Use    Smoking status: Never    Smokeless tobacco: Never   Substance Use Topics    Alcohol use: Yes     Alcohol/week: 5.0 standard drinks of alcohol     Types: 5 Glasses of wine per week    Drug use: No       Current Outpatient Medications   Medication Sig    ALPRAZolam (XANAX) 0.25 MG tablet TAKE ONE TABLET BY MOUTH THREE TIMES DAILY    buPROPion (WELLBUTRIN) 75 MG tablet Take 0.5 tablets (37.5 mg total) by mouth 2 (two) times daily.    busPIRone (BUSPAR) 5 MG Tab TAKE ONE TABLET BY MOUTH TWICE DAILY *MAY CAUSE DROWSINESS*    cetirizine (ZYRTEC) 10 MG tablet Take 10 mg by mouth.    citalopram (CELEXA) 20 MG tablet Take 1 tablet (20 mg total) by mouth once daily.    esomeprazole (NEXIUM) 20 MG capsule Take 20 mg by mouth 2 (two) times daily.    ibuprofen (ADVIL,MOTRIN) 600 MG tablet Take 1 tablet (600 mg total) by mouth every 6 (six) hours as needed for Pain.    MAGNESIUM ORAL Take 100 mg by mouth once daily.    ondansetron (ZOFRAN) 4 MG tablet Take 1 tablet (4 mg total) by mouth every 8 (eight) hours as needed for Nausea.    PNV no.95/ferrous fum/folic ac (PRENATAL ORAL) Take by mouth once daily. Ritual brand    traZODone (DESYREL) 50 MG tablet Take 1 tablet (50 mg total) by mouth every evening.     No current facility-administered medications for this visit.         Review of Systems:  Review of Systems   Constitutional:  Negative for activity change, appetite change, chills, fatigue, fever and unexpected weight change.   Respiratory:  Negative for cough, shortness of breath and wheezing.    Cardiovascular:  Negative for chest pain and leg swelling.   Gastrointestinal:  Negative for abdominal pain, constipation, diarrhea, nausea and vomiting.   Endocrine: Negative for hair loss and hot flashes.   Genitourinary:  Positive for menstrual problem. Negative for decreased libido, dyspareunia,  dysuria, frequency, pelvic pain, vaginal bleeding, vaginal discharge and vaginal pain.   Integumentary:  Negative for acne, hair changes, nipple discharge and breast skin changes.   Neurological:  Negative for headaches.   Psychiatric/Behavioral:  Negative for sleep disturbance.    Breast: Negative for mastodynia, nipple discharge and skin changes       OBJECTIVE:     Physical Exam:  Physical Exam  Constitutional:       Appearance: She is well-developed.   HENT:      Head: Normocephalic and atraumatic.   Eyes:      General: No scleral icterus.        Right eye: No discharge.         Left eye: No discharge.      Conjunctiva/sclera: Conjunctivae normal.   Pulmonary:      Effort: Pulmonary effort is normal.      Breath sounds: No stridor.   Chest:      Chest wall: No mass or tenderness.   Breasts:     Breasts are symmetrical.      Right: No inverted nipple, mass, nipple discharge, skin change or tenderness.      Left: No inverted nipple, mass, nipple discharge, skin change or tenderness.   Abdominal:      General: There is no distension.      Palpations: Abdomen is soft.      Tenderness: There is no abdominal tenderness.   Genitourinary:     Labia:         Right: No rash, tenderness, lesion or injury.         Left: No rash, tenderness, lesion or injury.       Vagina: Normal.      Cervix: No cervical motion tenderness, discharge or friability.      Adnexa:         Right: No mass, tenderness or fullness.          Left: No mass, tenderness or fullness.        Comments: Normal external genitalia.  Normal hair distribution.  Urethral meatus normal. No cervical lesions or masses.  No vaginal bleeding noted.  No adnexal or uterine tenderness.  No palpable adnexal masses.  Musculoskeletal:         General: Normal range of motion.   Skin:     General: Skin is warm and dry.   Neurological:      Mental Status: She is alert and oriented to person, place, and time.   Psychiatric:         Behavior: Behavior normal.         Thought  Content: Thought content normal.         Judgment: Judgment normal.           ASSESSMENT:       ICD-10-CM ICD-9-CM    1. Encounter for well woman exam with routine gynecological exam  Z01.419 V72.31 Liquid-Based Pap Smear, Screening      HPV High Risk Genotypes, PCR      2. Fertility testing  Z31.41 V26.21 ANTIMULLERIAN HORMONE ([facility])      Progesterone               Plan:      Darby was seen today for well woman.    Diagnoses and all orders for this visit:    Encounter for well woman exam with routine gynecological exam  - pap smear cotesting today  - MMG and Cscope not indicated at this time  -     Liquid-Based Pap Smear, Screening  -     HPV High Risk Genotypes, PCR      Orders Placed This Encounter   Procedures    HPV High Risk Genotypes, PCR    ANTIMULLERIAN HORMONE ([facility])    Progesterone         No follow-ups on file.    Counseling time: 15 minutes    Aisha Hussein          HPI:  Pt is a 38 y/o  here to discuss fertility.  Has juan david trying to get pregnant for past 9 months to a year.  Pt is done OPKs and using the [employer].  Getting mixed results with OPKs.  Some months positive and some months negative.  Last 5 months has had + OPKs around CD 16-17.  Has a cycle length around 30-32 days.  Had 1 cycle that was 42 days.  Took provera to have a period.  This was her cycle last month.   Took the provera for -  and then had cycle on .  Has a 3.6 y/o.  Has same partner.  SA done for first pregnancy.  Has not been repeated this pregnancy.  Did not require fertility for 1st pregnancy.  Pt's BMI 34.  Took GLP-1 shot and lost 15-20 lbs around 10/2023.  Started to regulate her periods after that weight loss.  When she got pregnant for first child, weighed around 165.  Currently 191 lbs.    PE: as above      A/P:     Fertility testing  -     ANTIMULLERIAN HORMONE ([facility]); Future  -     Progesterone; Future  - Discussion had with patient about etiologies of infertility including anovulation, structural  issues, low ovarian reserve, male factor, etc.  Discussed that the workup for infertility can be extensive and is usually done in a step wise factor.  Depending on the results, the patient understands that she may need to see the [facility] specialists for an additional workup and/or procedure.  For this patient, discussed that age and ovulation may be a contributing factor when considering her fertility workup.  During this discussion, all of her questions were answered in detail.   - Pt to message with [facility].  Will get 7DPO progesterone and [facility] level  - Will refer to [facility]-  Dr. Mayes at [facility].       Orders Placed This Encounter   Procedures    HPV High Risk Genotypes, PCR    ANTIMULLERIAN HORMONE ([facility])    Progesterone     Face to Face time with patient: 30 min    35 minutes of total time spent on the encounter, which includes face to face time and non-face to face time preparing to see the patient (eg, review of tests), Obtaining and/or reviewing separately obtained history, Documenting clinical information in the electronic or other health record, Independently interpreting results (not separately reported) and communicating results to the patient/family/caregiver, or Care coordination (not separately reported).      No follow-ups on file.     Aisha Hussein MD  Obstetrics & Gynecology

## 2024-05-20 ENCOUNTER — PATIENT MESSAGE (OUTPATIENT)
Dept: OBSTETRICS AND GYNECOLOGY | Facility: CLINIC | Age: 39
End: 2024-05-20
Payer: COMMERCIAL

## 2024-05-20 DIAGNOSIS — F39 MOOD DISORDER: ICD-10-CM

## 2024-05-20 NOTE — TELEPHONE ENCOUNTER
No care due was identified.  [facility] Embedded Care Due Messages. Reference number: [id number].   5/20/2024 8:05:00 AM CDT

## 2024-05-21 LAB
FINAL PATHOLOGIC DIAGNOSIS: NORMAL
Lab: NORMAL

## 2024-05-22 LAB
HPV HR 12 DNA SPEC QL NAA+PROBE: NEGATIVE
HPV16 AG SPEC QL: NEGATIVE
HPV18 DNA SPEC QL NAA+PROBE: NEGATIVE

## 2024-05-29 RX ORDER — ALPRAZOLAM 0.25 MG/1
0.25 TABLET ORAL NIGHTLY PRN
Qty: 30 TABLET | Refills: 2 | Status: SHIPPED | OUTPATIENT
Start: 2024-05-29

## 2024-06-06 ENCOUNTER — PATIENT MESSAGE (OUTPATIENT)
Dept: OBSTETRICS AND GYNECOLOGY | Facility: CLINIC | Age: 39
End: 2024-06-06
Payer: COMMERCIAL

## 2024-06-07 ENCOUNTER — PATIENT MESSAGE (OUTPATIENT)
Dept: OBSTETRICS AND GYNECOLOGY | Facility: CLINIC | Age: 39
End: 2024-06-07

## 2024-06-07 ENCOUNTER — CLINICAL SUPPORT (OUTPATIENT)
Dept: OBSTETRICS AND GYNECOLOGY | Facility: CLINIC | Age: 39
End: 2024-06-07
Payer: COMMERCIAL

## 2024-06-07 DIAGNOSIS — N91.2 AMENORRHEA: Primary | ICD-10-CM

## 2024-06-07 PROCEDURE — 99999 PR PBB SHADOW E&M-EST. PATIENT-LVL II: CPT | Mod: PBBFAC,,,

## 2024-06-07 NOTE — PROGRESS NOTES
Spoke with patient for a total of 30 minutes during virtual visit.  Updated chart to reflect up to date patient demographics.  Allergies, medications, pharmacy, medical/family history and OB history updated.  Patient was guided through expectations of care during pregnancy.  Pregnancy confirmation, dating u/s & first routine OB appts scheduled.  Education provided & questions answered. Encouraged to send message or call office with any questions/concerns. Verbalized understanding.     Discussed with pt:    taking PNV   denies n/v  C/o mild cramping/no spotting  Precautions discussed  Referred to [email or web address] for Preg A to Z guide & class schedule   Discussed benefits of breastfeeding - plans to breastfeed   Discussed need for pediatrician  AMA  Pre E after last delivery  Had Fe & blood transfusions during prev preg

## 2024-06-10 ENCOUNTER — PATIENT MESSAGE (OUTPATIENT)
Dept: HEMATOLOGY/ONCOLOGY | Facility: CLINIC | Age: 39
End: 2024-06-10
Payer: COMMERCIAL

## 2024-06-10 DIAGNOSIS — D50.0 IRON DEFICIENCY ANEMIA DUE TO CHRONIC BLOOD LOSS: Primary | ICD-10-CM

## 2024-07-03 ENCOUNTER — HOSPITAL ENCOUNTER (OUTPATIENT)
Dept: PERINATAL CARE | Facility: OTHER | Age: 39
Discharge: HOME | End: 2024-07-03
Attending: FAMILY MEDICINE
Payer: COMMERCIAL

## 2024-07-03 ENCOUNTER — OFFICE VISIT (OUTPATIENT)
Dept: OBSTETRICS AND GYNECOLOGY | Facility: CLINIC | Age: 39
End: 2024-07-03
Payer: COMMERCIAL

## 2024-07-03 VITALS
BODY MASS INDEX: 37.66 KG/M2 | DIASTOLIC BLOOD PRESSURE: 70 MMHG | SYSTOLIC BLOOD PRESSURE: 104 MMHG | WEIGHT: 199.5 LBS | HEIGHT: 61 IN

## 2024-07-03 DIAGNOSIS — Z32.01 POSITIVE PREGNANCY TEST: Primary | ICD-10-CM

## 2024-07-03 DIAGNOSIS — N91.4 SECONDARY OLIGOMENORRHEA: ICD-10-CM

## 2024-07-03 DIAGNOSIS — N91.2 AMENORRHEA: ICD-10-CM

## 2024-07-03 PROCEDURE — 76801 OB US < 14 WKS SINGLE FETUS: CPT

## 2024-07-03 PROCEDURE — 87086 URINE CULTURE/COLONY COUNT: CPT | Performed by: FAMILY MEDICINE

## 2024-07-03 PROCEDURE — 87491 CHLMYD TRACH DNA AMP PROBE: CPT | Performed by: FAMILY MEDICINE

## 2024-07-03 PROCEDURE — 3078F DIAST BP <80 MM HG: CPT | Mod: CPTII,S$GLB,, | Performed by: FAMILY MEDICINE

## 2024-07-03 PROCEDURE — 1159F MED LIST DOCD IN RCRD: CPT | Mod: CPTII,S$GLB,, | Performed by: FAMILY MEDICINE

## 2024-07-03 PROCEDURE — 99213 OFFICE O/P EST LOW 20 MIN: CPT | Mod: S$GLB,,, | Performed by: FAMILY MEDICINE

## 2024-07-03 PROCEDURE — 3074F SYST BP LT 130 MM HG: CPT | Mod: CPTII,S$GLB,, | Performed by: FAMILY MEDICINE

## 2024-07-03 PROCEDURE — 3008F BODY MASS INDEX DOCD: CPT | Mod: CPTII,S$GLB,, | Performed by: FAMILY MEDICINE

## 2024-07-03 PROCEDURE — 84156 ASSAY OF PROTEIN URINE: CPT | Performed by: FAMILY MEDICINE

## 2024-07-03 PROCEDURE — 99999 PR PBB SHADOW E&M-EST. PATIENT-LVL IV: CPT | Mod: PBBFAC,,, | Performed by: FAMILY MEDICINE

## 2024-07-03 PROCEDURE — 76801 OB US < 14 WKS SINGLE FETUS: CPT | Mod: 26,,, | Performed by: OBSTETRICS & GYNECOLOGY

## 2024-07-03 NOTE — PATIENT INSTRUCTIONS
LABOR AND DELIVERY PHONE NUMBER, [phone] (OPEN , LOCATED ON 6TH FLOOR OF HOSPITAL)  [address] PHONE NUMBER, [phone] (OPEN MON-FRI, 8a-5p)     1) Eat small frequent meals through the day versus three large meals  2) Try ginger ale or sprite to help settle the stomach - you can also take ginger capsules (250mg 4 times per day)  3) Eat crackers or dry toast before getting out of bed in the morning   4) Stay hydrated by drinking plenty of water-do not immediately eat or drink something after vomiting. Give your stomach a rest for 20-30 minutes. Slowly reintroduce ice chips, small sips water, crackers, etc.    5) Try OTC unisom (1/2 tablet) and vitamin B6 - take the 25mg b6 twice a day and then both together at night before bed. This can help with the nausea in the morning and is safe to use during pregnancy.  6) Sea bands (Accupressure wrist bands)    If you are unable to keep anything down and constantly vomiting for more that 24 hours, let the office know so that dehydration can be avoided. We would recommend being seen in the emergency department if this is the case.       Topic  General Pregnancy Information Recommended   (Unless Otherwise Contraindicated Or Restrictions Given To You By Your OB Doctor)      1. Anticipated course of prenatal care      Visits: will be Every 4 wks until 28 weeks, then every 2 weeks until 36 weeks, and then weekly until delivery.   Urine will be collected at each Obstetric visit        2. Nutrition and weight gain    Daily pre-zeinab vitamin (recommend taking at night)   Additional 300 calories needed daily  No Sushi, hotdogs, unpasteurized products (milk/cheeses). No large fish such as: shark, juan miguel mackerel, tile, sword fish   Incorporate 12 ounces of smaller seafood/week and no more than 6oz of albacore tuna   Caffeine: 200 mg/day or 2 cups of caffeine/day   Weight gain recommendations are based off of BMI before pregnancy. Generally patients who with normal weight prior  pregnancy it is recommended 25-35 pounds of weight gain during the pregnancy with an estimated weekly gain of 1 pound/wk in 2nd and 3rd Trimester.    3. Toxoplasmosis precautions  If cats are in the home avoid changing litter boxes and if you need to change the litter box recommended you use gloves   4. Sexual Activity  Sexual activity is okay unless you are put on restrictions by your provider. I recommend urinating after intercourse    5. Exercise  Generally pre-pregnancy routine is okay to continue   Drink plenty fluids for hydration   Stop any activity that causes heavy cramping like a period or bright red bleeding and contact your provider  No extreme or contact sports   No exercise on your back for an extended period of time after 20 weeks    6. Hot Tub/Saunas  Avoid hot tubes and saunas    7. Hair Treatments  Because of the lack of scientific studies on the effects of chemical treatments on your hair, we must advise that you do it at your own risk. If you choose to treat your hair, we recommend that you wait until after 12 weeks gestation. At this time there is no reason to believe that normal hair treatment is associated with onsequences to the baby.    8. Vaccines  Influenza vaccine is recommended by CDC during flu season   Tdap (pertussis or whopping cough) recommended each pregnancy between 27 and 36 weeks   Tdap booster recommended for family and other planned direct caregivers    9. Water  Water is an important nutrient in a good diet. However, it cannot be stressed enough that during pregnancy water is essential. The body has increased circulation through blood vessels, and without a large increase in water, pregnant women will be dehydrated. It plays an important role in decreasing constipation, preventing  contractions, decreasing swelling, and preventing dizziness. We recommend that you drink 8-10 glasses of water per day.    10. Smoking/Alcohol/Illicit Drug Use  No safe Level   Can lead to  problems with pregnancy   Growth of the developing fetus    labor (delivery before 37 weeks)    rupture of the membranes (water breaking before 37 weeks)   Premature separation of the placenta (which may cause bleeding)   American College of Obstetricians and Gynecologists endorses abstinence   Can lead to babies with disabilities    11. Environmental or work hazards  Unless otherwise restricted you may continue work throughout the pregnancy   Notify your provider of any work hazards or chemical exposure concerns   12. Travel    Safe to travel up to 35 weeks   Continue to wear a seatbelt and airbags are still recommended   Drink plenty fluids   Blood clots are a concern during pregnancy with long travel. Recommend compression stockings and moving around at least every 2 hours and staying hydrated.    13. Use of medications, vitamins, herbs, OTC drugs    Any medications not on the list provided to you from our clinic or given to you by one of our providers we recommend calling to make sure the medication is safe for you and baby.    14. Domestic Violence    Please notify office immediately of any concerns or violence so that we can help direct you to assistance needed   [place] [employer]: [phone]    15. Childbirth classes    List of Childbirth classes from [facility] is available    16. Selecting a Pediatrician  Selecting a pediatrician before delivery is recommended  You can interview pediatricians before delivery    17. Fetal Monitoring    A simple test of your babys well-being is a kick count. After 26 weeks, fetal motion of any kind should be monitored. Further discussion at that time   18.  Labor Signs    Water break, leaking fluids from Vagina prior 37 weeks  Regular contractions, Contractions that are more than 5-6/hour, getting stronger and painful with lower back pain, does not go away with rest and fluids    19. Postpartum Family Planning    Multiple  options available from short term methods to long term reversible and irreversible methods   Discuss with provider as you get closer to delivery    20. Dental    It is recommended that you get an annual dental cleaning    21. Breastfeeding    Classes offered at [facility] and it is recommended to take a class    22. Lifting In 2013, the National [place] for Occupational Safety and Health (NIOSH) published clinical guidelines for occupational lifting in uncomplicated pregnancies. The recommended weight limits are based on gestational age, intermittent versus repetitive lifting, time (hours/day) spent lifting, and lifting height from floor and distance in 3 front of body. In this guideline, the maximum permissible weight for a woman less than 20 weeks of gestation performing infrequent lifting is 36 pounds (16 kgs) and the maximum permissible weight at ?20 weeks is 26 pounds (12 kgs). For repetitive lifting ?1 hour/day, the maximum weights in the first and second half of pregnancy are 18 pounds (8 kgs) and 13 pounds (6 kgs), respectively, and for repetitive lifting <1 hour/day, the maximum weights are 30 pounds (14 kgs) and 22 pounds (10 kgs), respectively. Although not based on high quality evidence, these guidelines are a reasonable reference for counseling pregnant women     23. Scheduling and Provider Availability    Your Obstetric Doctor is usually here weekly but not every day. We recommend you make 3-4 advanced appointments at a time to accommodate your personal needs and work/school obligations.   We ask that you come 15 minutes prior your scheduled appointment.   For same day appointments (not routine appointments) there is a Nurse Practitioner or another obstetric provider available. Please let the  aware you are an OB patient requesting a same day appointment.      24. Recommended Phone [facility]      MEDICATIONS IN PREGNANCY     While some medications are considered safe to take  during pregnancy, the effects of other medications on your unborn baby are unknown. Therefore, it is very important to pay special attention to medications you take while you are pregnant.   If you were taking prescription medications before you became pregnant, please ask your health care provider about continuing these medications as soon as you find out that you are pregnant. Do not stop taking any medications without discussing with your health care provider. Your health care provider will weigh the benefits and risks to your pregnancy when making his or her recommendation. With some medications, the risk of not taking them may be more serious than the potential risk of taking them.   If you are prescribed any new medication, please inform your health care provider that you are pregnant. Be sure to discuss the risks and benefits of the newly prescribed medication with your health care provider before taking the medication. We are always available to answer any questions about new medications and how they relate to your pregnancy.     Are Alternative Pregnancy Medicine Therapies Safe?   Many pregnant women believe natural products can be safely used to relieve nausea, backache, and other annoying symptoms of pregnancy, but many of these so-called natural products have not been tested for their safety and effectiveness. Therefore, it is very important to check with your health care provider before taking any alternative therapies. She will not recommend a product or therapy until it is shown to be safe and effective.     Which Over the Counter Drugs Are Safe?   Prenatal vitamins, now available without a prescription, are safe and important to take during pregnancy. Ask your health care provider about the safety of taking other vitamins, herbal remedies and supplements during pregnancy. Most herbal preparations and supplements have not been proven to be safe during pregnancy. Generally, you should not take any  over-the-counter medication unless it is necessary. The following medications and home remedies have no known harmful effects during pregnancy when taken according to the package directions. If you want to know about the safety of any other medications not listed here, please contact your health care provider.      Problem:  Safe to Take:    Pain relief, headache, and fever  Acetaminophen - Tylenol, Anacin Aspirin-Free    Heartburn  Acid neutralizers - Maalox, Mylanta, Rolaids, Tums, Gaviscon   Histamine-blockers - Pepcid, Zantac, Prilosec    Gas pains and bloating  Simethicone - Gas-X, Maalox Anti-Gas, Mylanta Gas, Mylicon    Nausea  Holley - beverages, tablets, candies   Vitamin B6   Emetrol (if not diabetic)   Sea bands   Anti-histamines - Sleep-dima, Benadryl, Bonnine, Dramamine    Cough  Guaifenesin (expectorant) - Hytuss, Mucinex, Robitussin   Dextromethorphan (antitussive) - Benylin, Delsym, Scot-Tussin DM   Guaifenesin plus dextromethorphan - Benylin Expectorant, Robitussin DM    Congestion  Pseudoephedrine - Sudafed, Actifed, Dristan, Neosynephrine   Vicks VapoRub   Saline nasal drops or spray    Sore throat  Throat lozenges - Sucrets, Cepacol, Cepastat, Ricola   Chloroseptic Spray   Warm salt/water gargle    Allergy relief  Chlorpheniramine - Chlor-Trimeton, Triaminic   Loratadine - Alavert, Claritin, Tavist ND, Triaminic Allerchews   Cetirizine - Zyrtec   Diphenhydramine -Benadryl, Diphenhist    Rashes  Hydrocortisone cream or ointment   Caladryl lotion or cream   Benadryl cream   Oatmeal bath (Aveeno)    Diarrhea  Loperamide - Imodium, Kaopectate, Maalox Anti-Diarrheal, Pepto Bismol    Constipation  Fiber supplements - Metamucil, Citrucel, Fiberall/Fibercon, Benefiber   Stool softeners - Colace, Senekot, Dulcolax   Milk of Magnesia    Hemorrhoids  Warm baths   Witch hazel preparations - Tucks medicated pads   Steroid preparations - Anusol-HC, Preparation H    Insomnia  Diphenhydramine - Benadryl, Unisom  SleepGels, Nytol, Sominex   Doxylamine succinate - Unisom Nighttime Sleep-Aid    First-aid ointments  Cortaid, Lanacort, Polysporin, Bacitracin, Neosporin    Yeast infections  Call office for appointment      Connected MOM   Using Wireless Technology to Manage Your Prenatal Health   Congratulations! Your team here at [facility] is excited for the upcoming addition to your family and is ready to support you over the course of your pregnancy. One of the ways we are prepared to help you is through our exciting new Digital Medicine Program, Connected MOM. Connected MOM stands for Connected Maternity Online Monitoring and is offered free of charge as a way to help our expectant mothers manage their pregnancy with fewer visits to the obstetrician.    In the fast-paced environment we live in, patients demand convenient, reliable access to healthcare at their fingertips. Recommended by The American College of Obstetricians and Gynecologists (ACOG), the standard prenatal care model for low-risk pregnancies can average anywhere between 12-14 in-office prenatal visits.    Through this innovative program, participating mothers-to-be receive a wireless scale, wireless blood pressure cuff and an at-home urine protein test kit. Through the use of a smartphone, patients can easily send their weight, blood pressure readings and urine protein test results digitally in real-time from the comfort of their own homes. Results are sent directly to their [facility] account, the systems online patient portal which connects directly to the patients Electronic Medical Record. A specialized Connected MOM care team - comprised of the patients obstetrician, a dedicated health  and a  - reviews the data and provides medical recommendations as needed. This allows expectant mothers to receive care proactively, wherever they are, while minimizing the need for in-person visits and thus minimizing  disruption to their regular daily activities.    How it Works At the initial prenatal visit, interested patients can work with their obstetrician to sign up for the program. After the appointment, expectant mothers can head to the [facility], a first-of-its-kind retail experience created by [facility] offering the latest in cutting-edge, interactive healthcare technology, to receive a Connected MOM kit containing all of the digital tools needed for remote monitoring. Once enrolled, patients weigh themselves regularly and take their blood pressure once a week. Instead of coming to three office appointments at weeks 20, 30, 37 the patient will have the appointment at home. They will take their blood pressure, weight and perform three at-home urine protein tests at these home visits. Results are directly transmitted into the EMR, and notifications and messages from the care team are communicated via [facility].     TECH SUPPORT [phone]  [email or web address]      Chromosomal testing  The sequential screen is a test done around 11-13 weeks that consists of an ultrasound that measures the nuchal fold (neck thickness) of baby and blood work on the same day. You get a second set of labs done a few weeks later after 15 weeks. Based on all three of these results, they are able to tell you if you are considered to be low risk or high risk regarding neural tube defects and chromosomal abnormalities like Down Syndrome. If you are at all interested, I usually place the order today so we do not miss the window. Someone will give you a phone call in a week or two to schedule this. If you do not want it, just tell them no thank you. This test is typically covered by your insurance and is performed by the Maternal Fetal Medicine (MFM) department here at [facility], [place], [facility] by [facility]. It will not tell you the sex of the baby.     OR     2.  Call [phone] to see about coverage and any out of pocket costs regarding the [id number] (MT21) testing.  This is done any day after 11 weeks and is blood work only. It checks for any chromosomal abnormalities like Down Syndrome. You can also find out the sex of the baby if you choose to know. Once you find out coverage and decide to proceed, send either myself or your doctor a message and we can see what date you can do it. It is done at our second floor lab at [facility].

## 2024-07-03 NOTE — PROGRESS NOTES
HISTORY OF PRESENT ILLNESS:    Lauren K Damico is a 39 y.o. female, ,  Patient's last menstrual period was 2024 (exact date).  for a routine exam complaining of amenorrhea and positive home UPT. Previous c section in , preE PP. Same partner who is healthy. Sees hematology next week for anemia. Having breast tenderness and bloating, no NV/VB/pain. +abn pap and no std hx. 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 This is the extent of the patient's complaints at this time.     Past Medical History:   Diagnosis Date    Abnormal Pap smear 12    colpo/LEEP    Anemia     Anxiety     PCOS (polycystic ovarian syndrome)        Past Surgical History:   Procedure Laterality Date    CERVICAL BIOPSY  W/ LOOP ELECTRODE EXCISION  10/17/2012     SECTION N/A 2020    Procedure:  SECTION;  Surgeon: Karo Zayas MD;  Location: [facility]&D;  Service: OB/GYN;  Laterality: N/A;     SECTION  20    CHOLECYSTECTOMY  2015    GALLBLADDER SURGERY      GASTRIC RESTRICTION SURGERY      gastric sleeve in 2018    WISDOM TOOTH EXTRACTION         MEDICATIONS AND ALLERGIES:      Current Outpatient Medications:     buPROPion (WELLBUTRIN) 75 MG tablet, Take 0.5 tablets (37.5 mg total) by mouth 2 (two) times daily., Disp: 30 tablet, Rfl: 11    busPIRone (BUSPAR) 5 MG Tab, TAKE ONE TABLET BY MOUTH TWICE DAILY *MAY CAUSE DROWSINESS*, Disp: 60 tablet, Rfl: 11    cetirizine (ZYRTEC) 10 MG tablet, Take 10 mg by mouth., Disp: , Rfl:     citalopram (CELEXA) 20 MG tablet, Take 1 tablet (20 mg total) by mouth once daily., Disp: 30 tablet, Rfl: 11    esomeprazole (NEXIUM) 20 MG capsule, Take 20 mg by mouth 2 (two) times daily., Disp: , Rfl: 0    ibuprofen (ADVIL,MOTRIN) 600 MG tablet, Take 1 tablet (600 mg total) by mouth every 6 (six) hours as needed for Pain., Disp: 20 tablet, Rfl: 0    PNV no.95/ferrous fum/folic ac (PRENATAL ORAL), Take by mouth once daily. Ritual brand, Disp: , Rfl:     traZODone  (DESYREL) 50 MG tablet, Take 1 tablet (50 mg total) by mouth every evening., Disp: 30 tablet, Rfl: 11    ALPRAZolam (XANAX) 0.25 MG tablet, Take 1 tablet (0.25 mg total) by mouth nightly as needed for Anxiety. (Patient not taking: Reported on 7/3/2024), Disp: 30 tablet, Rfl: 2    MAGNESIUM ORAL, Take 100 mg by mouth once daily. (Patient not taking: Reported on 7/3/2024), Disp: , Rfl:     ondansetron (ZOFRAN) 4 MG tablet, Take 1 tablet (4 mg total) by mouth every 8 (eight) hours as needed for Nausea. (Patient not taking: Reported on 7/3/2024), Disp: 12 tablet, Rfl: 0    Review of patient's allergies indicates:  No Known Allergies    Family History   Problem Relation Name Age of Onset    Ovarian cancer Paternal Grandmother Beverly Damico     Heart disease Paternal Grandmother Beverly Damico     Breast cancer Paternal Aunt      Ovarian cancer Paternal Aunt      Breast cancer Paternal Aunt      Uterine cancer Paternal Aunt      Alcohol abuse Maternal Grandfather Brenden Nguyen     Depression Maternal Grandmother Annika Nguyen     Colon cancer Neg Hx         Social History     Socioeconomic History    Marital status:    Tobacco Use    Smoking status: Never    Smokeless tobacco: Never   Substance and Sexual Activity    Alcohol use: Not Currently     Alcohol/week: 5.0 standard drinks of alcohol     Types: 5 Glasses of wine per week    Drug use: No    Sexual activity: Yes     Partners: Male     Birth control/protection: None     Social Determinants of Health     Financial Resource Strain: Medium Risk (2/14/2024)    Overall Financial Resource Strain (CARDIA)     Difficulty of Paying Living Expenses: Somewhat hard   Food Insecurity: No Food Insecurity (2/14/2024)    Hunger Vital Sign     Worried About Running Out of Food in the Last Year: Never true     Ran Out of Food in the Last Year: Never true   Transportation Needs: No Transportation Needs (2/14/2024)    PRAPARE - Transportation     Lack of Transportation  "(Medical): No     Lack of Transportation (Non-Medical): No   Physical Activity: Insufficiently Active (2/14/2024)    Exercise Vital Sign     Days of Exercise per Week: 3 days     Minutes of Exercise per Session: 20 min   Stress: Stress Concern Present (2/14/2024)    [facility] - Occupational Stress Questionnaire     Feeling of Stress : Rather much   Housing Stability: Low Risk  (2/14/2024)    Housing Stability Vital Sign     Unable to Pay for Housing in the Last Year: No     Number of Places Lived in the Last Year: 1     Unstable Housing in the Last Year: No       COMPREHENSIVE GYN HISTORY:  PAP History: + abnormal Paps.  Infection History: Denies STDs. Denies PID.  Benign History: Denies uterine fibroids. Denies ovarian cysts. Denies endometriosis. Denies other conditions.  Cancer History: Denies cervical cancer. Denies uterine cancer or hyperplasia. Denies ovarian cancer. Denies vulvar cancer or pre-cancer. Denies vaginal cancer or pre-cancer. Denies breast cancer. Denies colon cancer.  Sexual Activity History: Reports currently being sexually active  Menstrual History: None.  Contraception: None    ROS:  GENERAL: No weight changes. No swelling. No fatigue. No fever.  CARDIOVASCULAR: No chest pain. No shortness of breath. No leg cramps.   NEUROLOGICAL: No headaches. No vision changes.  BREASTS: + pain. No lumps. No discharge.  ABDOMEN: No pain. No nausea. No vomiting. No diarrhea. No constipation.+bloating  REPRODUCTIVE: No abnormal bleeding. No pelvic pain  VULVA: No pain. No lesions. No itching.  VAGINA: No relaxation. No itching. No odor. No discharge. No lesions.  URINARY: No incontinence. No nocturia. No frequency. No dysuria.    /70   Ht 5' 1" (1.549 m)   Wt 90.5 kg (199 lb 8.3 oz)   LMP 05/02/2024 (Exact Date)   BMI 37.70 kg/m²     PE:  Physical Exam:   Constitutional: She appears well-developed and well-nourished. She does not appear ill. No distress.      "   Pulmonary/Chest: Right breast exhibits no inverted nipple, no mass, no nipple discharge, no skin change, no tenderness and no swelling. Left breast exhibits no inverted nipple, no mass, no nipple discharge, no skin change, no tenderness and no swelling.          Genitourinary:    Urethra, vagina, uterus, right adnexa and left adnexa normal.      Pelvic exam was performed with patient in the lithotomy position.   The external female genitalia was normal.   Genitalia hair distrobution normal .   There is no rash or lesion on the right labia. There is no rash or lesion on the left labia. Cervix is normal. No rectocele, cystocele or prolapse of vaginal walls in the vagina.    pap smear not completedUterus is not tender. Normal urethral meatus.              Neurological: GCS eye subscore is 4. GCS verbal subscore is 5. GCS motor subscore is 6.         PROCEDURES:  UPT Positive  Genprobe  Pap-  NL      DIAGNOSIS:  Gyn exam  IUP with stated LMP of Patient's last menstrual period was 2024 (exact date).  ========   Indication: Estimation of Gestational Age   Indication: Advanced Maternal Age (AMA), Multigravida   Indication: Previous    Indication: Hx of LEEP, Currently Pregnant     History   ======   Previous Outcomes    2   Para 1   Risk Factors   History risk factors: Obesity BMI >35   Details: BMI 36   History risk factors: Hx    Details: x1   History risk factors: Hx LEEP / CKC     Method   ======   [place] S8, Transabdominal and transvaginal ultrasound examination. View: Good view     Pregnancy   =========   Sims pregnancy. Number of embryos: 1     Dating   ======   LMP on: 2024   GA by LMP 8 w + 6 d   KARAN by LMP: 2025   Ultrasound examination on: 7/3/2024   GA by U/S based upon: CRL   GA by U/S 8 w + 4 d   KARAN by U/S: 2025   Assigned: based on ultrasound (CRL), selected on 07/3/2024   Assigned GA 8 w + 4 d   Assigned KARAN: 2025     Assessment   ==========    Gestational sac: visualized   Location: intrauterine   Yolk sac: visualized   Amniotic sac: visualized   Embryo: visualized   CRL 20.3 mm 8w 4d [place]   Cardiac activity: present    bpm     Maternal Structures   ===============   Uterus / Cervix   Uterus: Normal   Cervix: Visualized   Approach: Transvaginal   Ovaries / Tubes / Adnexa   Rt ovary: Normal   Rt ovary D1 36 mm   Rt ovary D2 20 mm   Rt ovary D3 27 mm   Rt ovary Vol 10.2 cmï¿½   Lt ovary: Normal   Lt ovary D1 35 mm   Lt ovary D2 27 mm   Lt ovary D3 35 mm   Lt ovary Vol 17.7 cmï¿½   Cul de Sac / Bladder / Kidneys / Other   Free fluid: No free fluid visualized     Impression   =========   A blanchard living IUP is identified. KARAN is assigned based on the CRL from today?s study. If other clinical data, such as IVF conception dating or prior ultrasound   assignment of KARAN differs from the KARAN assigned today, please contact the [facility] department so that this report can be revised to reflect the correct KARAN.   The maternal adnexae are normal in appearance. The amount of free fluid seen in the pelvis is within normal physiologic range.   PLAN:Routine prenatal care    MEDICATIONS PRESCRIBED:  PNV    LABS AND TESTS ORDERED:  New Ob Labs      1st TRIMESTER COUNSELING: Discussed all, booklet provided  Common complaints of pregnancy  HIV and other routine prenatal tests including  genetic screening  Risk factors identified by prenatal history  Anticipated course of prenatal care  Nutrition and weight gain counseling  Toxoplasmosis precautions (Cats/Raw Meat)  Sexual activity and exercise  Environmental/Work hazards  Travel  Tobacco (Ask, Advise, Assess, Assist, and Arrange), as well as alcohol and drug use  Use of any medications (Including supplements, Vitamins, Herbs, or OTC Drugs)  Indications for Ultrasound  Domestic violence  Seat belt use  Childbirth classes/Hospital facilities     TERATOLOGY COUNSELING: Discussed options for SS, MT21 - carrier  screening done through fertility recently (pt pos for deaf/blind trait but  neg). Pt will let us know her desires. Discussed time constraints on SS      FOLLOW-UP for a New Ob Visit in   4   weeks with   -discussed to call clinic or L&D/ER if after hours for pain/bleeding  -discussed otc meds/tx for NV she will let me know if rx needed  -baseline preE labs for hx of preE/AMA. Baby asa at 12-14w

## 2024-07-05 LAB
CREAT UR-MCNC: 118 MG/DL (ref 15–325)
PROT UR-MCNC: 10 MG/DL (ref 0–15)
PROT/CREAT UR: 0.08 MG/G{CREAT} (ref 0–0.2)

## 2024-07-06 LAB
BACTERIA UR CULT: NORMAL
BACTERIA UR CULT: NORMAL

## 2024-07-10 ENCOUNTER — OFFICE VISIT (OUTPATIENT)
Dept: HEMATOLOGY/ONCOLOGY | Facility: CLINIC | Age: 39
End: 2024-07-10
Payer: COMMERCIAL

## 2024-07-10 ENCOUNTER — LAB VISIT (OUTPATIENT)
Dept: LAB | Facility: HOSPITAL | Age: 39
End: 2024-07-10
Payer: COMMERCIAL

## 2024-07-10 VITALS
HEIGHT: 61 IN | TEMPERATURE: 99 F | OXYGEN SATURATION: 97 % | BODY MASS INDEX: 37.91 KG/M2 | RESPIRATION RATE: 20 BRPM | SYSTOLIC BLOOD PRESSURE: 110 MMHG | HEART RATE: 83 BPM | DIASTOLIC BLOOD PRESSURE: 57 MMHG | WEIGHT: 200.81 LBS

## 2024-07-10 DIAGNOSIS — D50.0 IRON DEFICIENCY ANEMIA DUE TO CHRONIC BLOOD LOSS: Primary | ICD-10-CM

## 2024-07-10 DIAGNOSIS — D50.0 IRON DEFICIENCY ANEMIA DUE TO CHRONIC BLOOD LOSS: ICD-10-CM

## 2024-07-10 LAB
ALBUMIN SERPL BCP-MCNC: 3.2 G/DL (ref 3.5–5.2)
ALP SERPL-CCNC: 54 U/L (ref 55–135)
ALT SERPL W/O P-5'-P-CCNC: 13 U/L (ref 10–44)
ANION GAP SERPL CALC-SCNC: 5 MMOL/L (ref 8–16)
AST SERPL-CCNC: 10 U/L (ref 10–40)
BASOPHILS # BLD AUTO: 0.03 K/UL (ref 0–0.2)
BASOPHILS NFR BLD: 0.3 % (ref 0–1.9)
BILIRUB SERPL-MCNC: 0.2 MG/DL (ref 0.1–1)
BUN SERPL-MCNC: 10 MG/DL (ref 6–20)
CALCIUM SERPL-MCNC: 9.3 MG/DL (ref 8.7–10.5)
CHLORIDE SERPL-SCNC: 107 MMOL/L (ref 95–110)
CO2 SERPL-SCNC: 24 MMOL/L (ref 23–29)
CREAT SERPL-MCNC: 0.7 MG/DL (ref 0.5–1.4)
DIFFERENTIAL METHOD BLD: ABNORMAL
EOSINOPHIL # BLD AUTO: 0.1 K/UL (ref 0–0.5)
EOSINOPHIL NFR BLD: 1.1 % (ref 0–8)
ERYTHROCYTE [DISTWIDTH] IN BLOOD BY AUTOMATED COUNT: 13.2 % (ref 11.5–14.5)
EST. GFR  (NO RACE VARIABLE): >60 ML/MIN/1.73 M^2
FERRITIN SERPL-MCNC: 48 NG/ML (ref 20–300)
GLUCOSE SERPL-MCNC: 97 MG/DL (ref 70–110)
HCT VFR BLD AUTO: 35.8 % (ref 37–48.5)
HGB BLD-MCNC: 12.1 G/DL (ref 12–16)
IMM GRANULOCYTES # BLD AUTO: 0.04 K/UL (ref 0–0.04)
IMM GRANULOCYTES NFR BLD AUTO: 0.4 % (ref 0–0.5)
IRON SERPL-MCNC: 69 UG/DL (ref 30–160)
LYMPHOCYTES # BLD AUTO: 2 K/UL (ref 1–4.8)
LYMPHOCYTES NFR BLD: 19.3 % (ref 18–48)
MCH RBC QN AUTO: 30.8 PG (ref 27–31)
MCHC RBC AUTO-ENTMCNC: 33.8 G/DL (ref 32–36)
MCV RBC AUTO: 91 FL (ref 82–98)
MONOCYTES # BLD AUTO: 0.8 K/UL (ref 0.3–1)
MONOCYTES NFR BLD: 7.7 % (ref 4–15)
NEUTROPHILS # BLD AUTO: 7.3 K/UL (ref 1.8–7.7)
NEUTROPHILS NFR BLD: 71.2 % (ref 38–73)
NRBC BLD-RTO: 0 /100 WBC
PLATELET # BLD AUTO: 307 K/UL (ref 150–450)
PMV BLD AUTO: 9.1 FL (ref 9.2–12.9)
POTASSIUM SERPL-SCNC: 4.3 MMOL/L (ref 3.5–5.1)
PROT SERPL-MCNC: 6.4 G/DL (ref 6–8.4)
RBC # BLD AUTO: 3.93 M/UL (ref 4–5.4)
SATURATED IRON: 22 % (ref 20–50)
SODIUM SERPL-SCNC: 136 MMOL/L (ref 136–145)
TOTAL IRON BINDING CAPACITY: 314 UG/DL (ref 250–450)
TRANSFERRIN SERPL-MCNC: 212 MG/DL (ref 200–375)
WBC # BLD AUTO: 10.19 K/UL (ref 3.9–12.7)

## 2024-07-10 PROCEDURE — 99999 PR PBB SHADOW E&M-EST. PATIENT-LVL IV: CPT | Mod: PBBFAC,,, | Performed by: INTERNAL MEDICINE

## 2024-07-10 PROCEDURE — 3078F DIAST BP <80 MM HG: CPT | Mod: CPTII,S$GLB,, | Performed by: INTERNAL MEDICINE

## 2024-07-10 PROCEDURE — 84466 ASSAY OF TRANSFERRIN: CPT | Performed by: INTERNAL MEDICINE

## 2024-07-10 PROCEDURE — G2211 COMPLEX E/M VISIT ADD ON: HCPCS | Mod: S$GLB,,, | Performed by: INTERNAL MEDICINE

## 2024-07-10 PROCEDURE — 85025 COMPLETE CBC W/AUTO DIFF WBC: CPT | Performed by: INTERNAL MEDICINE

## 2024-07-10 PROCEDURE — 82728 ASSAY OF FERRITIN: CPT | Performed by: INTERNAL MEDICINE

## 2024-07-10 PROCEDURE — 3008F BODY MASS INDEX DOCD: CPT | Mod: CPTII,S$GLB,, | Performed by: INTERNAL MEDICINE

## 2024-07-10 PROCEDURE — 1159F MED LIST DOCD IN RCRD: CPT | Mod: CPTII,S$GLB,, | Performed by: INTERNAL MEDICINE

## 2024-07-10 PROCEDURE — 3074F SYST BP LT 130 MM HG: CPT | Mod: CPTII,S$GLB,, | Performed by: INTERNAL MEDICINE

## 2024-07-10 PROCEDURE — 80053 COMPREHEN METABOLIC PANEL: CPT | Performed by: INTERNAL MEDICINE

## 2024-07-10 PROCEDURE — 99215 OFFICE O/P EST HI 40 MIN: CPT | Mod: S$GLB,,, | Performed by: INTERNAL MEDICINE

## 2024-07-10 PROCEDURE — 36415 COLL VENOUS BLD VENIPUNCTURE: CPT | Performed by: INTERNAL MEDICINE

## 2024-07-10 NOTE — PROGRESS NOTES
"Hematology and Bone Marrow Transplant  Follow-up Visit     07/10/2024    Reason For Visit: Iron Deficiency Anemia       History of Present Ilness:   Lauren K Damico (Darby) is a pleasant 39 y.o.female who presents for 3 month follow-up of iron deficiency anemia. Labs done in 2020 showed a significant anemia with hemoglobin in the 6's with a ferritin of 3. She was given 1 unit PRBC and injectafer x 2 doses. 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 States she is taking a Prenatal vitamin with 18 mg iron. She denies any vaginal bleeding and no abdominal pain. States she just feels "uncomfortable" Reports fatigue but states she has been working alot. She reports some trouble sleeping and some sob. She has been relating these issues to her advancing pregnancy. She complains of nausea and is taking Zofran and eating small meals.       Interval History:   Ms. Damico is currently 9 weeks pregnant. Has been feeling light headed at times. Particularly when standing. Works as an OR nurse at [employer].     Willing to repeat iv iron if she needs.     PAST MEDICAL HISTORY:   Past Medical History:   Diagnosis Date    Abnormal Pap smear 12    colpo/LEEP    Anemia     Anxiety     PCOS (polycystic ovarian syndrome)        PAST SURGICAL HISTORY:   Past Surgical History:   Procedure Laterality Date    CERVICAL BIOPSY  W/ LOOP ELECTRODE EXCISION  10/17/2012     SECTION N/A 2020    Procedure:  SECTION;  Surgeon: Karo Zayas MD;  Location: [facility]&D;  Service: OB/GYN;  Laterality: N/A;     SECTION  20    CHOLECYSTECTOMY  2015    GALLBLADDER SURGERY      GASTRIC RESTRICTION SURGERY      gastric sleeve in 2018    WISDOM TOOTH EXTRACTION         PAST SOCIAL HISTORY:   reports that she has never smoked. She has never used smokeless tobacco. She reports that she does not currently use alcohol after a " past usage of about 5.0 standard drinks of alcohol per week. She reports that she does not use drugs.    FAMILY HISTORY:  Family History   Problem Relation Name Age of Onset    Ovarian cancer Paternal Grandmother Beverly Damico     Heart disease Paternal Grandmother Beverly Damico     Breast cancer Paternal Aunt      Ovarian cancer Paternal Aunt      Breast cancer Paternal Aunt      Uterine cancer Paternal Aunt      Alcohol abuse Maternal Grandfather Brenden Nguyen     Depression Maternal Grandmother Annika Nguyen     Colon cancer Neg Hx         CURRENT MEDICATIONS:   Current Outpatient Medications   Medication Sig    ALPRAZolam (XANAX) 0.25 MG tablet Take 1 tablet (0.25 mg total) by mouth nightly as needed for Anxiety. (Patient not taking: Reported on 7/3/2024)    buPROPion (WELLBUTRIN) 75 MG tablet Take 0.5 tablets (37.5 mg total) by mouth 2 (two) times daily.    busPIRone (BUSPAR) 5 MG Tab TAKE ONE TABLET BY MOUTH TWICE DAILY *MAY CAUSE DROWSINESS*    cetirizine (ZYRTEC) 10 MG tablet Take 10 mg by mouth.    citalopram (CELEXA) 20 MG tablet Take 1 tablet (20 mg total) by mouth once daily.    esomeprazole (NEXIUM) 20 MG capsule Take 20 mg by mouth 2 (two) times daily.    ibuprofen (ADVIL,MOTRIN) 600 MG tablet Take 1 tablet (600 mg total) by mouth every 6 (six) hours as needed for Pain.    MAGNESIUM ORAL Take 100 mg by mouth once daily. (Patient not taking: Reported on 7/3/2024)    ondansetron (ZOFRAN) 4 MG tablet Take 1 tablet (4 mg total) by mouth every 8 (eight) hours as needed for Nausea. (Patient not taking: Reported on 7/3/2024)    PNV no.95/ferrous fum/folic ac (PRENATAL ORAL) Take by mouth once daily. Ritual brand    traZODone (DESYREL) 50 MG tablet Take 1 tablet (50 mg total) by mouth every evening.     No current facility-administered medications for this visit.     ALLERGIES:   Review of patient's allergies indicates:  No Known Allergies      Review of Systems:     Review of Systems   Constitutional:   Positive for fatigue. Negative for appetite change, chills, diaphoresis, fever and unexpected weight change.   HENT:   Negative for hearing loss, mouth sores, nosebleeds, sore throat, trouble swallowing and voice change.    Eyes:  Negative for eye problems and icterus.   Respiratory:  Positive for shortness of breath. Negative for chest tightness, cough, hemoptysis and wheezing.    Cardiovascular:  Negative for chest pain, leg swelling and palpitations.   Gastrointestinal:  Positive for nausea. Negative for abdominal distention, abdominal pain, blood in stool, diarrhea and vomiting.   Endocrine: Negative for hot flashes.   Genitourinary:  Negative for bladder incontinence, difficulty urinating, dysuria, frequency and hematuria.    Musculoskeletal:  Negative for arthralgias, back pain, flank pain, gait problem, myalgias, neck pain and neck stiffness.   Skin:  Negative for itching, rash and wound.   Neurological:  Positive for light-headedness. Negative for dizziness, extremity weakness, gait problem, headaches, numbness, seizures and speech difficulty.   Hematological:  Negative for adenopathy. Does not bruise/bleed easily.   Psychiatric/Behavioral:  Negative for confusion, depression and sleep disturbance. The patient is not nervous/anxious.           Physical Exam:     Vitals:    07/10/24 1404   BP: (!) 110/57   Pulse: 83   Resp: 20   Temp: 98.5 °F (36.9 °C)         Physical Exam  Constitutional:       General: She is not in acute distress.     Appearance: She is not diaphoretic.   HENT:      Mouth/Throat:      Pharynx: No oropharyngeal exudate.   Cardiovascular:      Rate and Rhythm: Normal rate and regular rhythm.      Heart sounds: Normal heart sounds.   Pulmonary:      Effort: Pulmonary effort is normal. No respiratory distress.      Breath sounds: Normal breath sounds.   Abdominal:      General: Bowel sounds are normal.      Palpations: Abdomen is soft.      Tenderness: There is no abdominal tenderness.    Skin:     General: Skin is warm and dry.      Findings: No rash.   Neurological:      Mental Status: She is alert and oriented to person, place, and time.   Psychiatric:         Mood and Affect: Affect normal.         ECOG Performance Status: (foot note - ECOG PS provided by Eastern Co[employer] Oncology Group) 1 - Symptomatic but completely ambulatory    Karnofsky Performance Score:  80%- Normal Activity with Effort: Some Symptoms of Disease    Labs:   Lab Results   Component Value Date    WBC 10.19 07/10/2024    HGB 12.1 07/10/2024    HCT 35.8 (L) 07/10/2024     07/10/2024    CHOL 181 10/04/2017    TRIG 87 10/04/2017    HDL 40 10/04/2017    ALT 11 04/07/2024    AST 11 04/07/2024     04/07/2024    K 3.6 04/07/2024     04/07/2024    CREATININE 0.7 04/07/2024    BUN 10 04/07/2024    CO2 24 04/07/2024    TSH 1.266 11/19/2019    GLUF 92 11/19/2019    HGBA1C 5.4 11/19/2019     Iron: 69  TIBC: 314  Ferritin:48    Imaging: Previous imaging has been reviewed     Assessment and Plan:     Ms. Damico is a 39 year old female who is currently 36 weeks pregnant and has iron deficiency anemia.    Iron Deficiency Anemia  --denies vaginal bleeding or GI bleeding  --Iron numbers have again dropped  --I anticipate she will again be iron deficient further into pregnancy  --Plan for repeat labs at 20 and 30 weeks pregnant    40 minutes in total were spent on this encounter, of which 30 minutes was spent face to face.     Disposition: Return to clinic in 10 weeks with cbc and iron studies      Jes Jackson MD  Hematology/Bone Marrow Transplant  [facility]        BMT Chart Routing      Follow up with physician . Labs in 10 and 20 weeks [cbc,cmp, iron, ferritin] 2. see me with each lab check   Follow up with MORENO    Provider visit type    Infusion scheduling note    Injection scheduling note    Labs    Imaging    Pharmacy appointment    Other referrals

## 2024-08-08 ENCOUNTER — INITIAL PRENATAL (OUTPATIENT)
Dept: OBSTETRICS AND GYNECOLOGY | Facility: CLINIC | Age: 39
End: 2024-08-08
Payer: COMMERCIAL

## 2024-08-08 ENCOUNTER — TELEPHONE (OUTPATIENT)
Dept: OBSTETRICS AND GYNECOLOGY | Facility: CLINIC | Age: 39
End: 2024-08-08
Payer: COMMERCIAL

## 2024-08-08 VITALS — DIASTOLIC BLOOD PRESSURE: 60 MMHG | BODY MASS INDEX: 37.91 KG/M2 | SYSTOLIC BLOOD PRESSURE: 98 MMHG | WEIGHT: 200.63 LBS

## 2024-08-08 DIAGNOSIS — Z34.82 ENCOUNTER FOR SUPERVISION OF OTHER NORMAL PREGNANCY IN SECOND TRIMESTER: Primary | ICD-10-CM

## 2024-08-08 DIAGNOSIS — O21.9 NAUSEA AND VOMITING IN PREGNANCY: ICD-10-CM

## 2024-08-08 PROCEDURE — 99999 PR PBB SHADOW E&M-EST. PATIENT-LVL III: CPT | Mod: PBBFAC,,, | Performed by: OBSTETRICS & GYNECOLOGY

## 2024-08-08 PROCEDURE — 0500F INITIAL PRENATAL CARE VISIT: CPT | Mod: CPTII,S$GLB,, | Performed by: OBSTETRICS & GYNECOLOGY

## 2024-08-08 RX ORDER — ASPIRIN 81 MG/1
81 TABLET ORAL DAILY
COMMUNITY
Start: 2024-08-08 | End: 2025-05-15

## 2024-08-08 RX ORDER — ONDANSETRON 4 MG/1
4 TABLET, ORALLY DISINTEGRATING ORAL EVERY 6 HOURS PRN
Qty: 30 TABLET | Refills: 1 | Status: SHIPPED | OUTPATIENT
Start: 2024-08-08

## 2024-08-24 ENCOUNTER — PATIENT MESSAGE (OUTPATIENT)
Dept: OTHER | Facility: OTHER | Age: 39
End: 2024-08-24
Payer: COMMERCIAL

## 2024-08-31 ENCOUNTER — PATIENT MESSAGE (OUTPATIENT)
Dept: OTHER | Facility: OTHER | Age: 39
End: 2024-08-31
Payer: COMMERCIAL

## 2024-09-03 ENCOUNTER — LAB VISIT (OUTPATIENT)
Dept: LAB | Facility: OTHER | Age: 39
End: 2024-09-03
Attending: OBSTETRICS & GYNECOLOGY
Payer: COMMERCIAL

## 2024-09-03 ENCOUNTER — ROUTINE PRENATAL (OUTPATIENT)
Dept: OBSTETRICS AND GYNECOLOGY | Facility: CLINIC | Age: 39
End: 2024-09-03
Payer: COMMERCIAL

## 2024-09-03 VITALS — DIASTOLIC BLOOD PRESSURE: 56 MMHG | BODY MASS INDEX: 37.91 KG/M2 | WEIGHT: 200.63 LBS | SYSTOLIC BLOOD PRESSURE: 98 MMHG

## 2024-09-03 DIAGNOSIS — Z36.9 ANTENATAL SCREENING ENCOUNTER: ICD-10-CM

## 2024-09-03 DIAGNOSIS — Z34.82 ENCOUNTER FOR SUPERVISION OF OTHER NORMAL PREGNANCY IN SECOND TRIMESTER: Primary | ICD-10-CM

## 2024-09-03 PROCEDURE — 36415 COLL VENOUS BLD VENIPUNCTURE: CPT | Performed by: OBSTETRICS & GYNECOLOGY

## 2024-09-03 PROCEDURE — 99999 PR PBB SHADOW E&M-EST. PATIENT-LVL III: CPT | Mod: PBBFAC,,, | Performed by: OBSTETRICS & GYNECOLOGY

## 2024-09-03 PROCEDURE — 0500F INITIAL PRENATAL CARE VISIT: CPT | Mod: CPTII,S$GLB,, | Performed by: OBSTETRICS & GYNECOLOGY

## 2024-09-03 PROCEDURE — 82105 ALPHA-FETOPROTEIN SERUM: CPT | Performed by: OBSTETRICS & GYNECOLOGY

## 2024-09-03 NOTE — PROGRESS NOTES
Pt doing well.  Denies any issues at this time.  Denies regular CTX, VB, VD, LOF.  + FM.  Continue PNV.  Labor precautions reviewed.  Pt to RTC in 6-8 weeks.  Glucose and CBC at next visit.  Has anatomy US scheduled.  NIPT negative.  AFP today.  Discussed Connected M[facility].  Pt plans to sign up today.

## 2024-09-06 LAB
# FETUSES US: NORMAL
AFP INTERPRETATION: NORMAL
AFP MOM SERPL: 0.77
AFP SERPL-MCNC: 25.7 NG/ML
AFP SERPL-MCNC: NEGATIVE NG/ML
AGE AT DELIVERY: 37
GA (DAYS): 3 D
GA (WEEKS): 17 WK
GESTATIONAL AGE METHOD: NORMAL
IDDM PATIENT QL: NORMAL
SMOKING STATUS FTND: NO

## 2024-09-16 ENCOUNTER — PROCEDURE VISIT (OUTPATIENT)
Dept: MATERNAL FETAL MEDICINE | Facility: CLINIC | Age: 39
End: 2024-09-16
Payer: COMMERCIAL

## 2024-09-16 DIAGNOSIS — Z34.82 ENCOUNTER FOR SUPERVISION OF OTHER NORMAL PREGNANCY IN SECOND TRIMESTER: ICD-10-CM

## 2024-09-16 DIAGNOSIS — Z36.2 ENCOUNTER FOR FOLLOW-UP ULTRASOUND OF FETAL ANATOMY: Primary | ICD-10-CM

## 2024-09-16 PROCEDURE — 76817 TRANSVAGINAL US OBSTETRIC: CPT | Mod: S$GLB,,, | Performed by: OBSTETRICS & GYNECOLOGY

## 2024-09-16 PROCEDURE — 76811 OB US DETAILED SNGL FETUS: CPT | Mod: S$GLB,,, | Performed by: OBSTETRICS & GYNECOLOGY

## 2024-09-21 ENCOUNTER — PATIENT MESSAGE (OUTPATIENT)
Dept: OTHER | Facility: OTHER | Age: 39
End: 2024-09-21
Payer: COMMERCIAL

## 2024-09-23 ENCOUNTER — LAB VISIT (OUTPATIENT)
Dept: LAB | Facility: HOSPITAL | Age: 39
End: 2024-09-23
Attending: INTERNAL MEDICINE
Payer: COMMERCIAL

## 2024-09-23 ENCOUNTER — OFFICE VISIT (OUTPATIENT)
Dept: HEMATOLOGY/ONCOLOGY | Facility: CLINIC | Age: 39
End: 2024-09-23
Payer: COMMERCIAL

## 2024-09-23 VITALS
TEMPERATURE: 98 F | SYSTOLIC BLOOD PRESSURE: 102 MMHG | BODY MASS INDEX: 38.98 KG/M2 | RESPIRATION RATE: 18 BRPM | OXYGEN SATURATION: 95 % | DIASTOLIC BLOOD PRESSURE: 54 MMHG | WEIGHT: 206.44 LBS | HEART RATE: 85 BPM | HEIGHT: 61 IN

## 2024-09-23 DIAGNOSIS — D50.0 IRON DEFICIENCY ANEMIA DUE TO CHRONIC BLOOD LOSS: ICD-10-CM

## 2024-09-23 DIAGNOSIS — D50.0 IRON DEFICIENCY ANEMIA DUE TO CHRONIC BLOOD LOSS: Primary | ICD-10-CM

## 2024-09-23 LAB
ALBUMIN SERPL BCP-MCNC: 2.8 G/DL (ref 3.5–5.2)
ALP SERPL-CCNC: 57 U/L (ref 55–135)
ALT SERPL W/O P-5'-P-CCNC: 8 U/L (ref 10–44)
ANION GAP SERPL CALC-SCNC: 7 MMOL/L (ref 8–16)
AST SERPL-CCNC: 8 U/L (ref 10–40)
BASOPHILS # BLD AUTO: 0.03 K/UL (ref 0–0.2)
BASOPHILS NFR BLD: 0.3 % (ref 0–1.9)
BILIRUB SERPL-MCNC: 0.1 MG/DL (ref 0.1–1)
BUN SERPL-MCNC: 8 MG/DL (ref 6–20)
CALCIUM SERPL-MCNC: 8.6 MG/DL (ref 8.7–10.5)
CHLORIDE SERPL-SCNC: 110 MMOL/L (ref 95–110)
CO2 SERPL-SCNC: 23 MMOL/L (ref 23–29)
CREAT SERPL-MCNC: 0.6 MG/DL (ref 0.5–1.4)
DIFFERENTIAL METHOD BLD: ABNORMAL
EOSINOPHIL # BLD AUTO: 0.1 K/UL (ref 0–0.5)
EOSINOPHIL NFR BLD: 0.8 % (ref 0–8)
ERYTHROCYTE [DISTWIDTH] IN BLOOD BY AUTOMATED COUNT: 12.9 % (ref 11.5–14.5)
EST. GFR  (NO RACE VARIABLE): >60 ML/MIN/1.73 M^2
FERRITIN SERPL-MCNC: 29 NG/ML (ref 20–300)
GLUCOSE SERPL-MCNC: 109 MG/DL (ref 70–110)
HCT VFR BLD AUTO: 33.1 % (ref 37–48.5)
HGB BLD-MCNC: 11.2 G/DL (ref 12–16)
IMM GRANULOCYTES # BLD AUTO: 0.07 K/UL (ref 0–0.04)
IMM GRANULOCYTES NFR BLD AUTO: 0.7 % (ref 0–0.5)
IRON SERPL-MCNC: 71 UG/DL (ref 30–160)
LYMPHOCYTES # BLD AUTO: 1.8 K/UL (ref 1–4.8)
LYMPHOCYTES NFR BLD: 17.9 % (ref 18–48)
MCH RBC QN AUTO: 30.4 PG (ref 27–31)
MCHC RBC AUTO-ENTMCNC: 33.8 G/DL (ref 32–36)
MCV RBC AUTO: 90 FL (ref 82–98)
MONOCYTES # BLD AUTO: 0.6 K/UL (ref 0.3–1)
MONOCYTES NFR BLD: 6.2 % (ref 4–15)
NEUTROPHILS # BLD AUTO: 7.3 K/UL (ref 1.8–7.7)
NEUTROPHILS NFR BLD: 74.1 % (ref 38–73)
NRBC BLD-RTO: 0 /100 WBC
PLATELET # BLD AUTO: 280 K/UL (ref 150–450)
PMV BLD AUTO: 9.4 FL (ref 9.2–12.9)
POTASSIUM SERPL-SCNC: 3.7 MMOL/L (ref 3.5–5.1)
PROT SERPL-MCNC: 5.8 G/DL (ref 6–8.4)
RBC # BLD AUTO: 3.68 M/UL (ref 4–5.4)
SATURATED IRON: 22 % (ref 20–50)
SODIUM SERPL-SCNC: 140 MMOL/L (ref 136–145)
TOTAL IRON BINDING CAPACITY: 318 UG/DL (ref 250–450)
TRANSFERRIN SERPL-MCNC: 215 MG/DL (ref 200–375)
WBC # BLD AUTO: 9.82 K/UL (ref 3.9–12.7)

## 2024-09-23 PROCEDURE — 99215 OFFICE O/P EST HI 40 MIN: CPT | Mod: S$GLB,,, | Performed by: INTERNAL MEDICINE

## 2024-09-23 PROCEDURE — 83540 ASSAY OF IRON: CPT | Performed by: INTERNAL MEDICINE

## 2024-09-23 PROCEDURE — 1159F MED LIST DOCD IN RCRD: CPT | Mod: CPTII,S$GLB,, | Performed by: INTERNAL MEDICINE

## 2024-09-23 PROCEDURE — 82728 ASSAY OF FERRITIN: CPT | Performed by: INTERNAL MEDICINE

## 2024-09-23 PROCEDURE — 80053 COMPREHEN METABOLIC PANEL: CPT | Performed by: INTERNAL MEDICINE

## 2024-09-23 PROCEDURE — 3078F DIAST BP <80 MM HG: CPT | Mod: CPTII,S$GLB,, | Performed by: INTERNAL MEDICINE

## 2024-09-23 PROCEDURE — 99999 PR PBB SHADOW E&M-EST. PATIENT-LVL IV: CPT | Mod: PBBFAC,,, | Performed by: INTERNAL MEDICINE

## 2024-09-23 PROCEDURE — 3074F SYST BP LT 130 MM HG: CPT | Mod: CPTII,S$GLB,, | Performed by: INTERNAL MEDICINE

## 2024-09-23 PROCEDURE — 85025 COMPLETE CBC W/AUTO DIFF WBC: CPT | Performed by: INTERNAL MEDICINE

## 2024-09-23 PROCEDURE — 36415 COLL VENOUS BLD VENIPUNCTURE: CPT | Performed by: INTERNAL MEDICINE

## 2024-09-23 PROCEDURE — 3008F BODY MASS INDEX DOCD: CPT | Mod: CPTII,S$GLB,, | Performed by: INTERNAL MEDICINE

## 2024-09-23 NOTE — PROGRESS NOTES
"  Hematology and Bone Marrow Transplant  Follow-up Visit     2024    Reason For Visit: Iron Deficiency Anemia       History of Present Ilness:   Lauren K Damico (Darby) is a pleasant 39 y.o.female who presents for 3 month follow-up of iron deficiency anemia. Labs done in 2020 showed a significant anemia with hemoglobin in the 6's with a ferritin of 3. She was given 1 unit PRBC and injectafer x 2 doses. 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 States she is taking a Prenatal vitamin with 18 mg iron. She denies any vaginal bleeding and no abdominal pain. States she just feels "uncomfortable" Reports fatigue but states she has been working alot. She reports some trouble sleeping and some sob. She has been relating these issues to her advancing pregnancy. She complains of nausea and is taking Zofran and eating small meals.       Interval History:   Ms. Damico is currently 20 weeks pregnant. Generally feeling much better in the 2nd trimester. Energy is improved. Works as an OR nurse at [employer].     Plans for trip to [place] in coming weeks.    Willing to repeat iv iron if she needs.     PAST MEDICAL HISTORY:   Past Medical History:   Diagnosis Date    Abnormal Pap smear 12    colpo/LEEP    Anemia     Anxiety     PCOS (polycystic ovarian syndrome)        PAST SURGICAL HISTORY:   Past Surgical History:   Procedure Laterality Date    CERVICAL BIOPSY  W/ LOOP ELECTRODE EXCISION  10/17/2012     SECTION N/A 2020    Procedure:  SECTION;  Surgeon: Karo Zayas MD;  Location: [facility] L&D;  Service: OB/GYN;  Laterality: N/A;     SECTION  20    CHOLECYSTECTOMY  2015    GALLBLADDER SURGERY      GASTRIC RESTRICTION SURGERY      gastric sleeve in 2018    WISDOM TOOTH EXTRACTION         PAST SOCIAL HISTORY:   reports that she has never smoked. She has never used smokeless tobacco. She reports " that she does not currently use alcohol after a past usage of about 5.0 standard drinks of alcohol per week. She reports that she does not use drugs.    FAMILY HISTORY:  Family History   Problem Relation Name Age of Onset    Ovarian cancer Paternal Grandmother Beverly Damico     Heart disease Paternal Grandmother Beverly Damico     Breast cancer Paternal Aunt      Ovarian cancer Paternal Aunt      Breast cancer Paternal Aunt      Uterine cancer Paternal Aunt      Alcohol abuse Maternal Grandfather Brenden Nguyen     Depression Maternal Grandmother Annika Nguyen     Colon cancer Neg Hx         CURRENT MEDICATIONS:   Current Outpatient Medications   Medication Sig    aspirin (ECOTRIN) 81 MG EC tablet Take 1 tablet (81 mg total) by mouth once daily.    buPROPion (WELLBUTRIN) 75 MG tablet Take 0.5 tablets (37.5 mg total) by mouth 2 (two) times daily.    busPIRone (BUSPAR) 5 MG Tab TAKE ONE TABLET BY MOUTH TWICE DAILY *MAY CAUSE DROWSINESS*    citalopram (CELEXA) 20 MG tablet Take 1 tablet (20 mg total) by mouth once daily.    esomeprazole (NEXIUM) 20 MG capsule Take 20 mg by mouth 2 (two) times daily.    MAGNESIUM ORAL Take 100 mg by mouth once daily.    ondansetron (ZOFRAN) 4 MG tablet Take 1 tablet (4 mg total) by mouth every 8 (eight) hours as needed for Nausea.    PNV no.95/ferrous fum/folic ac (PRENATAL ORAL) Take by mouth once daily. Ritual brand    traZODone (DESYREL) 50 MG tablet Take 1 tablet (50 mg total) by mouth every evening.    ALPRAZolam (XANAX) 0.25 MG tablet Take 1 tablet (0.25 mg total) by mouth nightly as needed for Anxiety. (Patient not taking: Reported on 7/3/2024)    cetirizine (ZYRTEC) 10 MG tablet Take 10 mg by mouth. (Patient not taking: Reported on 9/23/2024)    ibuprofen (ADVIL,MOTRIN) 600 MG tablet Take 1 tablet (600 mg total) by mouth every 6 (six) hours as needed for Pain. (Patient not taking: Reported on 8/8/2024)    ondansetron (ZOFRAN-ODT) 4 MG TbDL Take 1 tablet (4 mg total) by mouth  every 6 (six) hours as needed. (Patient not taking: Reported on 9/3/2024)     No current facility-administered medications for this visit.     ALLERGIES:   Review of patient's allergies indicates:  No Known Allergies      Review of Systems:     Review of Systems   Constitutional:  Positive for fatigue. Negative for appetite change, chills, diaphoresis, fever and unexpected weight change.   HENT:   Negative for hearing loss, mouth sores, nosebleeds, sore throat, trouble swallowing and voice change.    Eyes:  Negative for eye problems and icterus.   Respiratory:  Positive for shortness of breath. Negative for chest tightness, cough, hemoptysis and wheezing.    Cardiovascular:  Negative for chest pain, leg swelling and palpitations.   Gastrointestinal:  Positive for nausea. Negative for abdominal distention, abdominal pain, blood in stool, diarrhea and vomiting.   Endocrine: Negative for hot flashes.   Genitourinary:  Negative for bladder incontinence, difficulty urinating, dysuria, frequency and hematuria.    Musculoskeletal:  Negative for arthralgias, back pain, flank pain, gait problem, myalgias, neck pain and neck stiffness.   Skin:  Negative for itching, rash and wound.   Neurological:  Positive for light-headedness. Negative for dizziness, extremity weakness, gait problem, headaches, numbness, seizures and speech difficulty.   Hematological:  Negative for adenopathy. Does not bruise/bleed easily.   Psychiatric/Behavioral:  Negative for confusion, depression and sleep disturbance. The patient is not nervous/anxious.           Physical Exam:     Vitals:    09/23/24 1321   BP: (!) 102/54   Pulse: 85   Resp: 18   Temp: 98.1 °F (36.7 °C)         Physical Exam  Constitutional:       General: She is not in acute distress.     Appearance: She is not diaphoretic.   HENT:      Mouth/Throat:      Pharynx: No oropharyngeal exudate.   Cardiovascular:      Rate and Rhythm: Normal rate and regular rhythm.      Heart sounds:  Normal heart sounds.   Pulmonary:      Effort: Pulmonary effort is normal. No respiratory distress.      Breath sounds: Normal breath sounds.   Abdominal:      General: Bowel sounds are normal.      Palpations: Abdomen is soft.      Tenderness: There is no abdominal tenderness.   Skin:     General: Skin is warm and dry.      Findings: No rash.   Neurological:      Mental Status: She is alert and oriented to person, place, and time.   Psychiatric:         Mood and Affect: Affect normal.         ECOG Performance Status: (foot note - ECOG PS provided by Eastern Cooperative Oncology Group) 1 - Symptomatic but completely ambulatory    Karnofsky Performance Score:  80%- Normal Activity with Effort: Some Symptoms of Disease    Labs:   Lab Results   Component Value Date    WBC 9.82 09/23/2024    HGB 11.2 (L) 09/23/2024    HCT 33.1 (L) 09/23/2024     09/23/2024    CHOL 181 10/04/2017    TRIG 87 10/04/2017    HDL 40 10/04/2017    ALT 8 (L) 09/23/2024    AST 8 (L) 09/23/2024     09/23/2024    K 3.7 09/23/2024     09/23/2024    CREATININE 0.6 09/23/2024    BUN 8 09/23/2024    CO2 23 09/23/2024    TSH 1.266 11/19/2019    GLUF 92 11/19/2019    HGBA1C 5.4 11/19/2019     Iron: 69 --> 71  TIBC: 314 --> 318  Ferritin:48 --> 29    Imaging: Previous imaging has been reviewed     Assessment and Plan:     Ms. Damico is a 39 year old female who is currently 36 weeks pregnant and has iron deficiency anemia.    Iron Deficiency Anemia  --denies vaginal bleeding or GI bleeding  --Iron numbers have again dropped  --I anticipate she will again be iron deficient further into pregnancy  --Plan for repeat labs at 30 weeks pregnant    40 minutes in total were spent on this encounter, of which 30 minutes was spent face to face.     Disposition: Return to clinic in 10 weeks with cbc and iron studies. Appt already made.      Jes Jackson MD  Hematology/Bone Marrow Transplant  [facility]        BMT Chart  Routing      Follow up with physician . Appts already made   Follow up with MORENO    Provider visit type    Infusion scheduling note    Injection scheduling note    Labs    Imaging    Pharmacy appointment    Other referrals

## 2024-09-26 ENCOUNTER — PATIENT MESSAGE (OUTPATIENT)
Dept: OBSTETRICS AND GYNECOLOGY | Facility: CLINIC | Age: 39
End: 2024-09-26
Payer: COMMERCIAL

## 2024-10-19 ENCOUNTER — PATIENT MESSAGE (OUTPATIENT)
Dept: OTHER | Facility: OTHER | Age: 39
End: 2024-10-19
Payer: COMMERCIAL

## 2024-10-21 ENCOUNTER — ROUTINE PRENATAL (OUTPATIENT)
Dept: OBSTETRICS AND GYNECOLOGY | Facility: CLINIC | Age: 39
End: 2024-10-21
Payer: COMMERCIAL

## 2024-10-21 ENCOUNTER — LAB VISIT (OUTPATIENT)
Dept: LAB | Facility: OTHER | Age: 39
End: 2024-10-21
Attending: OBSTETRICS & GYNECOLOGY
Payer: COMMERCIAL

## 2024-10-21 ENCOUNTER — PROCEDURE VISIT (OUTPATIENT)
Dept: MATERNAL FETAL MEDICINE | Facility: CLINIC | Age: 39
End: 2024-10-21
Payer: COMMERCIAL

## 2024-10-21 VITALS
SYSTOLIC BLOOD PRESSURE: 112 MMHG | WEIGHT: 206.38 LBS | BODY MASS INDEX: 38.99 KG/M2 | DIASTOLIC BLOOD PRESSURE: 60 MMHG

## 2024-10-21 DIAGNOSIS — Z34.82 ENCOUNTER FOR SUPERVISION OF OTHER NORMAL PREGNANCY IN SECOND TRIMESTER: Primary | ICD-10-CM

## 2024-10-21 DIAGNOSIS — Z34.82 ENCOUNTER FOR SUPERVISION OF OTHER NORMAL PREGNANCY IN SECOND TRIMESTER: ICD-10-CM

## 2024-10-21 DIAGNOSIS — Z36.2 ENCOUNTER FOR FOLLOW-UP ULTRASOUND OF FETAL ANATOMY: ICD-10-CM

## 2024-10-21 DIAGNOSIS — Z87.59 HISTORY OF PRE-ECLAMPSIA: ICD-10-CM

## 2024-10-21 DIAGNOSIS — Z98.891 HISTORY OF CESAREAN SECTION: ICD-10-CM

## 2024-10-21 DIAGNOSIS — Z36.89 ENCOUNTER FOR ULTRASOUND TO ASSESS FETAL GROWTH: Primary | ICD-10-CM

## 2024-10-21 LAB
BASOPHILS # BLD AUTO: 0.01 K/UL (ref 0–0.2)
BASOPHILS NFR BLD: 0.1 % (ref 0–1.9)
DIFFERENTIAL METHOD BLD: ABNORMAL
EOSINOPHIL # BLD AUTO: 0.1 K/UL (ref 0–0.5)
EOSINOPHIL NFR BLD: 0.7 % (ref 0–8)
ERYTHROCYTE [DISTWIDTH] IN BLOOD BY AUTOMATED COUNT: 13.2 % (ref 11.5–14.5)
GLUCOSE SERPL-MCNC: 92 MG/DL (ref 70–140)
HCT VFR BLD AUTO: 32.3 % (ref 37–48.5)
HGB BLD-MCNC: 10.8 G/DL (ref 12–16)
IMM GRANULOCYTES # BLD AUTO: 0.05 K/UL (ref 0–0.04)
IMM GRANULOCYTES NFR BLD AUTO: 0.6 % (ref 0–0.5)
LYMPHOCYTES # BLD AUTO: 1.4 K/UL (ref 1–4.8)
LYMPHOCYTES NFR BLD: 15.9 % (ref 18–48)
MCH RBC QN AUTO: 30.3 PG (ref 27–31)
MCHC RBC AUTO-ENTMCNC: 33.4 G/DL (ref 32–36)
MCV RBC AUTO: 91 FL (ref 82–98)
MONOCYTES # BLD AUTO: 0.6 K/UL (ref 0.3–1)
MONOCYTES NFR BLD: 6.2 % (ref 4–15)
NEUTROPHILS # BLD AUTO: 6.9 K/UL (ref 1.8–7.7)
NEUTROPHILS NFR BLD: 76.5 % (ref 38–73)
NRBC BLD-RTO: 0 /100 WBC
PLATELET # BLD AUTO: 271 K/UL (ref 150–450)
PMV BLD AUTO: 8.9 FL (ref 9.2–12.9)
RBC # BLD AUTO: 3.56 M/UL (ref 4–5.4)
WBC # BLD AUTO: 9.02 K/UL (ref 3.9–12.7)

## 2024-10-21 PROCEDURE — 99999 PR PBB SHADOW E&M-EST. PATIENT-LVL III: CPT | Mod: PBBFAC,,, | Performed by: OBSTETRICS & GYNECOLOGY

## 2024-10-21 PROCEDURE — 36415 COLL VENOUS BLD VENIPUNCTURE: CPT | Performed by: OBSTETRICS & GYNECOLOGY

## 2024-10-21 PROCEDURE — 82950 GLUCOSE TEST: CPT | Performed by: OBSTETRICS & GYNECOLOGY

## 2024-10-21 PROCEDURE — 85025 COMPLETE CBC W/AUTO DIFF WBC: CPT | Performed by: OBSTETRICS & GYNECOLOGY

## 2024-10-21 PROCEDURE — 0502F SUBSEQUENT PRENATAL CARE: CPT | Mod: CPTII,S$GLB,, | Performed by: OBSTETRICS & GYNECOLOGY

## 2024-10-21 PROCEDURE — 76816 OB US FOLLOW-UP PER FETUS: CPT | Mod: S$GLB,,, | Performed by: OBSTETRICS & GYNECOLOGY

## 2024-10-21 NOTE — PROGRESS NOTES
Pt doing well.  Denies any issues at this time.  Denies regular CTX, VB, VD, LOF.  + FM.  Continue PNV.  Labor precautions reviewed.  Pt to RTC in 4 weeks.  Glucose and CBC today.  Getting flu shot through work.  Pt going on road trip soon.  Discussed compression socks and ambulating every few hours.  Pt wants RCS. Will follow up on date of surgery.  Taking ASA daily.

## 2024-11-02 ENCOUNTER — PATIENT MESSAGE (OUTPATIENT)
Dept: OTHER | Facility: OTHER | Age: 39
End: 2024-11-02
Payer: COMMERCIAL

## 2024-11-16 ENCOUNTER — PATIENT MESSAGE (OUTPATIENT)
Dept: OTHER | Facility: OTHER | Age: 39
End: 2024-11-16
Payer: COMMERCIAL

## 2024-11-19 ENCOUNTER — ROUTINE PRENATAL (OUTPATIENT)
Dept: OBSTETRICS AND GYNECOLOGY | Facility: CLINIC | Age: 39
End: 2024-11-19
Payer: COMMERCIAL

## 2024-11-19 VITALS
SYSTOLIC BLOOD PRESSURE: 106 MMHG | DIASTOLIC BLOOD PRESSURE: 60 MMHG | WEIGHT: 210.56 LBS | BODY MASS INDEX: 39.78 KG/M2

## 2024-11-19 DIAGNOSIS — Z34.83 ENCOUNTER FOR SUPERVISION OF OTHER NORMAL PREGNANCY IN THIRD TRIMESTER: Primary | ICD-10-CM

## 2024-11-19 PROCEDURE — 0502F SUBSEQUENT PRENATAL CARE: CPT | Mod: CPTII,S$GLB,, | Performed by: OBSTETRICS & GYNECOLOGY

## 2024-11-19 PROCEDURE — 99999 PR PBB SHADOW E&M-EST. PATIENT-LVL III: CPT | Mod: PBBFAC,,, | Performed by: OBSTETRICS & GYNECOLOGY

## 2024-11-19 NOTE — PROGRESS NOTES
Pt doing well.  Denies any issues at this time.  Denies regular CTX, VB, VD, LOF.  + FM.  Continue PNV.  Labor precautions reviewed.  Pt to RTC in 4 weeks.  Tdap today.  RSV at 32 weeks.  Has 32 week growth US. Would like RCS.  Discussed dates with patient.

## 2024-11-30 ENCOUNTER — PATIENT MESSAGE (OUTPATIENT)
Dept: OTHER | Facility: OTHER | Age: 39
End: 2024-11-30
Payer: COMMERCIAL

## 2024-12-02 ENCOUNTER — OFFICE VISIT (OUTPATIENT)
Dept: HEMATOLOGY/ONCOLOGY | Facility: CLINIC | Age: 39
End: 2024-12-02
Payer: COMMERCIAL

## 2024-12-02 ENCOUNTER — LAB VISIT (OUTPATIENT)
Dept: LAB | Facility: HOSPITAL | Age: 39
End: 2024-12-02
Payer: COMMERCIAL

## 2024-12-02 VITALS
OXYGEN SATURATION: 97 % | WEIGHT: 211.56 LBS | RESPIRATION RATE: 18 BRPM | DIASTOLIC BLOOD PRESSURE: 56 MMHG | BODY MASS INDEX: 39.94 KG/M2 | SYSTOLIC BLOOD PRESSURE: 105 MMHG | HEART RATE: 85 BPM | HEIGHT: 61 IN

## 2024-12-02 DIAGNOSIS — D50.0 IRON DEFICIENCY ANEMIA DUE TO CHRONIC BLOOD LOSS: Primary | ICD-10-CM

## 2024-12-02 DIAGNOSIS — D50.8 IRON DEFICIENCY ANEMIA SECONDARY TO INADEQUATE DIETARY IRON INTAKE: ICD-10-CM

## 2024-12-02 DIAGNOSIS — D50.0 IRON DEFICIENCY ANEMIA DUE TO CHRONIC BLOOD LOSS: ICD-10-CM

## 2024-12-02 LAB
ALBUMIN SERPL BCP-MCNC: 2.7 G/DL (ref 3.5–5.2)
ALP SERPL-CCNC: 83 U/L (ref 40–150)
ALT SERPL W/O P-5'-P-CCNC: 10 U/L (ref 10–44)
ANION GAP SERPL CALC-SCNC: 8 MMOL/L (ref 8–16)
AST SERPL-CCNC: 10 U/L (ref 10–40)
BASOPHILS # BLD AUTO: 0.03 K/UL (ref 0–0.2)
BASOPHILS NFR BLD: 0.3 % (ref 0–1.9)
BILIRUB SERPL-MCNC: 0.2 MG/DL (ref 0.1–1)
BUN SERPL-MCNC: 8 MG/DL (ref 6–20)
CALCIUM SERPL-MCNC: 8.4 MG/DL (ref 8.7–10.5)
CHLORIDE SERPL-SCNC: 107 MMOL/L (ref 95–110)
CO2 SERPL-SCNC: 20 MMOL/L (ref 23–29)
CREAT SERPL-MCNC: 0.6 MG/DL (ref 0.5–1.4)
DIFFERENTIAL METHOD BLD: ABNORMAL
EOSINOPHIL # BLD AUTO: 0.1 K/UL (ref 0–0.5)
EOSINOPHIL NFR BLD: 0.6 % (ref 0–8)
ERYTHROCYTE [DISTWIDTH] IN BLOOD BY AUTOMATED COUNT: 12.8 % (ref 11.5–14.5)
EST. GFR  (NO RACE VARIABLE): >60 ML/MIN/1.73 M^2
FERRITIN SERPL-MCNC: 7 NG/ML (ref 20–300)
GLUCOSE SERPL-MCNC: 128 MG/DL (ref 70–110)
HCT VFR BLD AUTO: 33.9 % (ref 37–48.5)
HGB BLD-MCNC: 11.2 G/DL (ref 12–16)
IMM GRANULOCYTES # BLD AUTO: 0.05 K/UL (ref 0–0.04)
IMM GRANULOCYTES NFR BLD AUTO: 0.6 % (ref 0–0.5)
IRON SERPL-MCNC: 34 UG/DL (ref 30–160)
LYMPHOCYTES # BLD AUTO: 1.6 K/UL (ref 1–4.8)
LYMPHOCYTES NFR BLD: 18.1 % (ref 18–48)
MCH RBC QN AUTO: 29.2 PG (ref 27–31)
MCHC RBC AUTO-ENTMCNC: 33 G/DL (ref 32–36)
MCV RBC AUTO: 88 FL (ref 82–98)
MONOCYTES # BLD AUTO: 0.4 K/UL (ref 0.3–1)
MONOCYTES NFR BLD: 4.3 % (ref 4–15)
NEUTROPHILS # BLD AUTO: 6.6 K/UL (ref 1.8–7.7)
NEUTROPHILS NFR BLD: 76.1 % (ref 38–73)
NRBC BLD-RTO: 0 /100 WBC
PLATELET # BLD AUTO: 294 K/UL (ref 150–450)
PMV BLD AUTO: 9 FL (ref 9.2–12.9)
POTASSIUM SERPL-SCNC: 3.7 MMOL/L (ref 3.5–5.1)
PROT SERPL-MCNC: 6 G/DL (ref 6–8.4)
RBC # BLD AUTO: 3.84 M/UL (ref 4–5.4)
SATURATED IRON: 7 % (ref 20–50)
SODIUM SERPL-SCNC: 135 MMOL/L (ref 136–145)
TOTAL IRON BINDING CAPACITY: 456 UG/DL (ref 250–450)
TRANSFERRIN SERPL-MCNC: 308 MG/DL (ref 200–375)
WBC # BLD AUTO: 8.64 K/UL (ref 3.9–12.7)

## 2024-12-02 PROCEDURE — 99999 PR PBB SHADOW E&M-EST. PATIENT-LVL IV: CPT | Mod: PBBFAC,,, | Performed by: INTERNAL MEDICINE

## 2024-12-02 PROCEDURE — 3078F DIAST BP <80 MM HG: CPT | Mod: CPTII,S$GLB,, | Performed by: INTERNAL MEDICINE

## 2024-12-02 PROCEDURE — 82728 ASSAY OF FERRITIN: CPT | Performed by: INTERNAL MEDICINE

## 2024-12-02 PROCEDURE — 3008F BODY MASS INDEX DOCD: CPT | Mod: CPTII,S$GLB,, | Performed by: INTERNAL MEDICINE

## 2024-12-02 PROCEDURE — 3074F SYST BP LT 130 MM HG: CPT | Mod: CPTII,S$GLB,, | Performed by: INTERNAL MEDICINE

## 2024-12-02 PROCEDURE — 80053 COMPREHEN METABOLIC PANEL: CPT | Performed by: INTERNAL MEDICINE

## 2024-12-02 PROCEDURE — 84466 ASSAY OF TRANSFERRIN: CPT | Performed by: INTERNAL MEDICINE

## 2024-12-02 PROCEDURE — 85025 COMPLETE CBC W/AUTO DIFF WBC: CPT | Performed by: INTERNAL MEDICINE

## 2024-12-02 PROCEDURE — 99215 OFFICE O/P EST HI 40 MIN: CPT | Mod: S$GLB,,, | Performed by: INTERNAL MEDICINE

## 2024-12-02 PROCEDURE — 1159F MED LIST DOCD IN RCRD: CPT | Mod: CPTII,S$GLB,, | Performed by: INTERNAL MEDICINE

## 2024-12-02 PROCEDURE — 36415 COLL VENOUS BLD VENIPUNCTURE: CPT | Performed by: INTERNAL MEDICINE

## 2024-12-02 RX ORDER — SODIUM CHLORIDE 0.9 % (FLUSH) 0.9 %
10 SYRINGE (ML) INJECTION
OUTPATIENT
Start: 2024-12-09

## 2024-12-02 RX ORDER — EPINEPHRINE 0.3 MG/.3ML
0.3 INJECTION SUBCUTANEOUS ONCE AS NEEDED
OUTPATIENT
Start: 2024-12-09

## 2024-12-02 RX ORDER — DIPHENHYDRAMINE HYDROCHLORIDE 50 MG/ML
50 INJECTION INTRAMUSCULAR; INTRAVENOUS ONCE AS NEEDED
OUTPATIENT
Start: 2024-12-09

## 2024-12-02 RX ORDER — DIPHENHYDRAMINE HYDROCHLORIDE 50 MG/ML
50 INJECTION INTRAMUSCULAR; INTRAVENOUS ONCE AS NEEDED
OUTPATIENT
Start: 2024-12-02

## 2024-12-02 RX ORDER — HEPARIN 100 UNIT/ML
500 SYRINGE INTRAVENOUS
OUTPATIENT
Start: 2024-12-09

## 2024-12-02 RX ORDER — SODIUM CHLORIDE 0.9 % (FLUSH) 0.9 %
10 SYRINGE (ML) INJECTION
OUTPATIENT
Start: 2024-12-02

## 2024-12-02 RX ORDER — EPINEPHRINE 0.3 MG/.3ML
0.3 INJECTION SUBCUTANEOUS ONCE AS NEEDED
OUTPATIENT
Start: 2024-12-02

## 2024-12-02 RX ORDER — HEPARIN 100 UNIT/ML
500 SYRINGE INTRAVENOUS
OUTPATIENT
Start: 2024-12-02

## 2024-12-02 NOTE — PROGRESS NOTES
"  Hematology and Bone Marrow Transplant  Follow-up Visit     2024    Reason For Visit: Iron Deficiency Anemia       History of Present Ilness:   Lauren K Damico (Darby) is a pleasant 39 y.o.female who presents for 3 month follow-up of iron deficiency anemia. Labs done in 2020 showed a significant anemia with hemoglobin in the 6's with a ferritin of 3. She was given 1 unit PRBC and injectafer x 2 doses. 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 States she is taking a Prenatal vitamin with 18 mg iron. She denies any vaginal bleeding and no abdominal pain. States she just feels "uncomfortable" Reports fatigue but states she has been working alot. She reports some trouble sleeping and some sob. She has been relating these issues to her advancing pregnancy. She complains of nausea and is taking Zofran and eating small meals.       Interval History:   On follow up patient reports feeling generally well. She feels more fatigued than on her previous visit; has some EAST when going up the stairs to her home. She also notes some restless legs. She denies any clinically significant bleeding. No significant symptoms of nausea so she has been eating and drinking appropriately.     PAST MEDICAL HISTORY:   Past Medical History:   Diagnosis Date    Abnormal Pap smear 12    colpo/LEEP    Anemia     Anxiety     PCOS (polycystic ovarian syndrome)        PAST SURGICAL HISTORY:   Past Surgical History:   Procedure Laterality Date    CERVICAL BIOPSY  W/ LOOP ELECTRODE EXCISION  10/17/2012     SECTION N/A 2020    Procedure:  SECTION;  Surgeon: Karo Zayas MD;  Location: [facility] L&D;  Service: OB/GYN;  Laterality: N/A;     SECTION  20    CHOLECYSTECTOMY  2015    GALLBLADDER SURGERY      GASTRIC RESTRICTION SURGERY      gastric sleeve in 2018    WISDOM TOOTH EXTRACTION         PAST SOCIAL " HISTORY:   reports that she has never smoked. She has never used smokeless tobacco. She reports that she does not currently use alcohol after a past usage of about 5.0 standard drinks of alcohol per week. She reports that she does not use drugs.    FAMILY HISTORY:  Family History   Problem Relation Name Age of Onset    Ovarian cancer Paternal Grandmother Beverly Damico     Heart disease Paternal Grandmother Beverly Damico     Breast cancer Paternal Aunt      Ovarian cancer Paternal Aunt      Breast cancer Paternal Aunt      Uterine cancer Paternal Aunt      Alcohol abuse Maternal Grandfather Brenden Nguyen     Depression Maternal Grandmother Annika Nguyen     Colon cancer Neg Hx         CURRENT MEDICATIONS:   Current Outpatient Medications   Medication Sig    aspirin (ECOTRIN) 81 MG EC tablet Take 1 tablet (81 mg total) by mouth once daily.    buPROPion (WELLBUTRIN) 75 MG tablet Take 0.5 tablets (37.5 mg total) by mouth 2 (two) times daily.    busPIRone (BUSPAR) 5 MG Tab TAKE ONE TABLET BY MOUTH TWICE DAILY *MAY CAUSE DROWSINESS*    cetirizine (ZYRTEC) 10 MG tablet Take 10 mg by mouth.    citalopram (CELEXA) 20 MG tablet Take 1 tablet (20 mg total) by mouth once daily.    esomeprazole (NEXIUM) 20 MG capsule Take 20 mg by mouth 2 (two) times daily.    ondansetron (ZOFRAN) 4 MG tablet Take 1 tablet (4 mg total) by mouth every 8 (eight) hours as needed for Nausea.    ondansetron (ZOFRAN-ODT) 4 MG TbDL Take 1 tablet (4 mg total) by mouth every 6 (six) hours as needed.    PNV no.95/ferrous fum/folic ac (PRENATAL ORAL) Take by mouth once daily. Ritual brand    traZODone (DESYREL) 50 MG tablet Take 1 tablet (50 mg total) by mouth every evening.    ALPRAZolam (XANAX) 0.25 MG tablet Take 1 tablet (0.25 mg total) by mouth nightly as needed for Anxiety. (Patient not taking: Reported on 7/3/2024)    ibuprofen (ADVIL,MOTRIN) 600 MG tablet Take 1 tablet (600 mg total) by mouth every 6 (six) hours as needed for Pain. (Patient not  taking: Reported on 8/8/2024)    MAGNESIUM ORAL Take 100 mg by mouth once daily. (Patient not taking: Reported on 12/2/2024)     No current facility-administered medications for this visit.     ALLERGIES:   Review of patient's allergies indicates:  No Known Allergies      Review of Systems:     Review of Systems   Constitutional:  Positive for fatigue. Negative for appetite change, chills, diaphoresis, fever and unexpected weight change.   HENT:   Negative for hearing loss, mouth sores, nosebleeds, sore throat, trouble swallowing and voice change.    Eyes:  Negative for eye problems and icterus.   Respiratory:  Positive for shortness of breath. Negative for chest tightness, cough, hemoptysis and wheezing.    Cardiovascular:  Negative for chest pain, leg swelling and palpitations.   Gastrointestinal:  Negative for abdominal distention, abdominal pain, blood in stool, diarrhea, nausea and vomiting.   Endocrine: Negative for hot flashes.   Genitourinary:  Negative for bladder incontinence, difficulty urinating, dysuria, frequency and hematuria.    Musculoskeletal:  Negative for arthralgias, back pain, flank pain, gait problem, myalgias, neck pain and neck stiffness.   Skin:  Negative for itching, rash and wound.   Neurological:  Negative for dizziness, extremity weakness, gait problem, headaches, light-headedness, numbness, seizures and speech difficulty.   Hematological:  Negative for adenopathy. Does not bruise/bleed easily.   Psychiatric/Behavioral:  Negative for confusion, depression and sleep disturbance. The patient is not nervous/anxious.           Physical Exam:     Vitals:    12/02/24 1303   BP: (!) 105/56   Pulse: 85   Resp: 18         Physical Exam  Constitutional:       General: She is not in acute distress.     Appearance: She is not diaphoretic.   HENT:      Mouth/Throat:      Pharynx: No oropharyngeal exudate.   Cardiovascular:      Rate and Rhythm: Normal rate and regular rhythm.      Heart sounds:  Normal heart sounds.   Pulmonary:      Effort: Pulmonary effort is normal. No respiratory distress.      Breath sounds: Normal breath sounds.   Abdominal:      General: Bowel sounds are normal.      Palpations: Abdomen is soft.      Tenderness: There is no abdominal tenderness.   Skin:     General: Skin is warm and dry.      Findings: No rash.   Neurological:      Mental Status: She is alert and oriented to person, place, and time.   Psychiatric:         Mood and Affect: Affect normal.         ECOG Performance Status: (foot note - ECOG PS provided by Eastern Cooperative Oncology Group) 1 - Symptomatic but completely ambulatory    Karnofsky Performance Score:  80%- Normal Activity with Effort: Some Symptoms of Disease    Labs:   Lab Results   Component Value Date    WBC 8.64 12/02/2024    HGB 11.2 (L) 12/02/2024    HCT 33.9 (L) 12/02/2024     12/02/2024    CHOL 181 10/04/2017    TRIG 87 10/04/2017    HDL 40 10/04/2017    ALT 10 12/02/2024    AST 10 12/02/2024     (L) 12/02/2024    K 3.7 12/02/2024     12/02/2024    CREATININE 0.6 12/02/2024    BUN 8 12/02/2024    CO2 20 (L) 12/02/2024    TSH 1.266 11/19/2019    GLUF 92 11/19/2019    HGBA1C 5.4 11/19/2019       Imaging: Previous imaging has been reviewed     Assessment and Plan:     Ms. Damico is a 39 year old female who is currently 36 weeks pregnant and has iron deficiency anemia.    Iron Deficiency Anemia  --denies vaginal bleeding or GI bleeding  --she has increased TIBC, low iron saturation, and low ferritin  --Plan to administer additional doses of IV iron replacement     Follow up with labs in 3-4 months    Ian Neil MD PGY VI  Hematology/Bone Marrow Transplant  [facility]        BMT Chart Routing      Follow up with physician . 3-4 months   Follow up with MORENO    Provider visit type    Infusion scheduling note    Injection scheduling note    Labs   Scheduling:  Preferred lab:  Lab interval:  Cbc, cmp, iron, tibc, ferritin  prior to follow up   Imaging    Pharmacy appointment    Other referrals

## 2024-12-05 ENCOUNTER — TELEPHONE (OUTPATIENT)
Dept: HEMATOLOGY/ONCOLOGY | Facility: CLINIC | Age: 39
End: 2024-12-05
Payer: COMMERCIAL

## 2024-12-05 NOTE — TELEPHONE ENCOUNTER
----- Message from Jes Jackson MD sent at 12/5/2024 12:51 PM CST -----  Hi [place],     This pts iv iron is approved. Can we get it scheduled x 2? She's 30 weeks pregnant so trying to get it in this month if at all possible.      Thanks,  Jes

## 2024-12-10 ENCOUNTER — ROUTINE PRENATAL (OUTPATIENT)
Dept: OBSTETRICS AND GYNECOLOGY | Facility: CLINIC | Age: 39
End: 2024-12-10
Payer: COMMERCIAL

## 2024-12-10 VITALS
WEIGHT: 208.56 LBS | BODY MASS INDEX: 39.41 KG/M2 | DIASTOLIC BLOOD PRESSURE: 66 MMHG | SYSTOLIC BLOOD PRESSURE: 108 MMHG

## 2024-12-10 DIAGNOSIS — Z34.83 ENCOUNTER FOR SUPERVISION OF OTHER NORMAL PREGNANCY IN THIRD TRIMESTER: Primary | ICD-10-CM

## 2024-12-10 PROCEDURE — 0502F SUBSEQUENT PRENATAL CARE: CPT | Mod: CPTII,S$GLB,, | Performed by: OBSTETRICS & GYNECOLOGY

## 2024-12-10 PROCEDURE — 99999 PR PBB SHADOW E&M-EST. PATIENT-LVL III: CPT | Mod: PBBFAC,,, | Performed by: OBSTETRICS & GYNECOLOGY

## 2024-12-10 NOTE — PROGRESS NOTES
Pt doing ok.  Having pressure at the end of the day and rocio hick contractions.  Denies regular CTX, VB, VD, LOF.  + FM.  Continue PNV.  Labor precautions reviewed.  Pt to RTC in 4 week.  32 week growth US next week.  3rd trim labs ordered.  Will add to anemia labs.  Getting iron infusion next week.  Consents reviewed and signed.  Will plan for RCS + BTL on 2/7.

## 2024-12-12 DIAGNOSIS — F39 UNSPECIFIED MOOD (AFFECTIVE) DISORDER: ICD-10-CM

## 2024-12-12 RX ORDER — BUSPIRONE HYDROCHLORIDE 5 MG/1
TABLET ORAL
Qty: 60 TABLET | Refills: 0 | Status: SHIPPED | OUTPATIENT
Start: 2024-12-12

## 2024-12-12 NOTE — TELEPHONE ENCOUNTER
No care due was identified.  [facility] Embedded Care Due Messages. Reference number: [id number].   12/12/2024 1:12:20 PM CST

## 2024-12-14 ENCOUNTER — PATIENT MESSAGE (OUTPATIENT)
Dept: OTHER | Facility: OTHER | Age: 39
End: 2024-12-14
Payer: COMMERCIAL

## 2024-12-15 ENCOUNTER — PATIENT MESSAGE (OUTPATIENT)
Dept: OBSTETRICS AND GYNECOLOGY | Facility: CLINIC | Age: 39
End: 2024-12-15
Payer: COMMERCIAL

## 2024-12-16 ENCOUNTER — PROCEDURE VISIT (OUTPATIENT)
Dept: MATERNAL FETAL MEDICINE | Facility: CLINIC | Age: 39
End: 2024-12-16
Payer: COMMERCIAL

## 2024-12-16 DIAGNOSIS — Z36.89 ENCOUNTER FOR ULTRASOUND TO ASSESS FETAL GROWTH: ICD-10-CM

## 2024-12-16 PROCEDURE — 76816 OB US FOLLOW-UP PER FETUS: CPT | Mod: S$GLB,,, | Performed by: OBSTETRICS & GYNECOLOGY

## 2024-12-20 ENCOUNTER — INFUSION (OUTPATIENT)
Dept: INFUSION THERAPY | Facility: HOSPITAL | Age: 39
End: 2024-12-20
Payer: COMMERCIAL

## 2024-12-20 VITALS
WEIGHT: 214.31 LBS | SYSTOLIC BLOOD PRESSURE: 110 MMHG | HEIGHT: 61 IN | DIASTOLIC BLOOD PRESSURE: 68 MMHG | BODY MASS INDEX: 40.46 KG/M2 | TEMPERATURE: 98 F | HEART RATE: 68 BPM | RESPIRATION RATE: 18 BRPM | OXYGEN SATURATION: 98 %

## 2024-12-20 DIAGNOSIS — D50.8 IRON DEFICIENCY ANEMIA SECONDARY TO INADEQUATE DIETARY IRON INTAKE: Primary | ICD-10-CM

## 2024-12-20 PROCEDURE — 63600175 PHARM REV CODE 636 W HCPCS: Mod: JZ,JG | Performed by: INTERNAL MEDICINE

## 2024-12-20 PROCEDURE — 96365 THER/PROPH/DIAG IV INF INIT: CPT

## 2024-12-20 PROCEDURE — 25000003 PHARM REV CODE 250: Performed by: INTERNAL MEDICINE

## 2024-12-20 RX ORDER — DIPHENHYDRAMINE HYDROCHLORIDE 50 MG/ML
50 INJECTION INTRAMUSCULAR; INTRAVENOUS ONCE AS NEEDED
Status: DISCONTINUED | OUTPATIENT
Start: 2024-12-20 | End: 2024-12-20 | Stop reason: HOSPADM

## 2024-12-20 RX ORDER — HEPARIN 100 UNIT/ML
500 SYRINGE INTRAVENOUS
Status: DISCONTINUED | OUTPATIENT
Start: 2024-12-20 | End: 2024-12-20 | Stop reason: HOSPADM

## 2024-12-20 RX ORDER — SODIUM CHLORIDE 0.9 % (FLUSH) 0.9 %
10 SYRINGE (ML) INJECTION
Status: DISCONTINUED | OUTPATIENT
Start: 2024-12-20 | End: 2024-12-20 | Stop reason: HOSPADM

## 2024-12-20 RX ORDER — EPINEPHRINE 0.3 MG/.3ML
0.3 INJECTION SUBCUTANEOUS ONCE AS NEEDED
Status: DISCONTINUED | OUTPATIENT
Start: 2024-12-20 | End: 2024-12-20 | Stop reason: HOSPADM

## 2024-12-20 RX ADMIN — FERRIC CARBOXYMALTOSE INJECTION 750 MG: 50 INJECTION, SOLUTION INTRAVENOUS at 02:12

## 2024-12-20 RX ADMIN — SODIUM CHLORIDE: 9 INJECTION, SOLUTION INTRAVENOUS at 02:12

## 2024-12-20 NOTE — PLAN OF CARE
Pt admitted for #1 Injectafer. Tolerated well. PIV removed and pt observed for 30 min post infusion as per protocol. Discharged home. Beautiful  young woman, soon to have a new baby!!

## 2024-12-27 ENCOUNTER — INFUSION (OUTPATIENT)
Dept: INFUSION THERAPY | Facility: HOSPITAL | Age: 39
End: 2024-12-27
Payer: COMMERCIAL

## 2024-12-27 VITALS
TEMPERATURE: 98 F | HEART RATE: 88 BPM | WEIGHT: 214.31 LBS | RESPIRATION RATE: 18 BRPM | HEIGHT: 61 IN | SYSTOLIC BLOOD PRESSURE: 107 MMHG | BODY MASS INDEX: 40.46 KG/M2 | OXYGEN SATURATION: 98 % | DIASTOLIC BLOOD PRESSURE: 67 MMHG

## 2024-12-27 DIAGNOSIS — D50.8 IRON DEFICIENCY ANEMIA SECONDARY TO INADEQUATE DIETARY IRON INTAKE: Primary | ICD-10-CM

## 2024-12-27 RX ORDER — HEPARIN 100 UNIT/ML
500 SYRINGE INTRAVENOUS
Status: DISCONTINUED | OUTPATIENT
Start: 2024-12-27 | End: 2024-12-27 | Stop reason: HOSPADM

## 2024-12-27 RX ORDER — EPINEPHRINE 0.3 MG/.3ML
0.3 INJECTION SUBCUTANEOUS ONCE AS NEEDED
Status: DISCONTINUED | OUTPATIENT
Start: 2024-12-27 | End: 2024-12-27 | Stop reason: HOSPADM

## 2024-12-27 RX ORDER — SODIUM CHLORIDE 0.9 % (FLUSH) 0.9 %
10 SYRINGE (ML) INJECTION
Status: DISCONTINUED | OUTPATIENT
Start: 2024-12-27 | End: 2024-12-27 | Stop reason: HOSPADM

## 2024-12-27 RX ORDER — DIPHENHYDRAMINE HYDROCHLORIDE 50 MG/ML
50 INJECTION INTRAMUSCULAR; INTRAVENOUS ONCE AS NEEDED
Status: DISCONTINUED | OUTPATIENT
Start: 2024-12-27 | End: 2024-12-27 | Stop reason: HOSPADM

## 2024-12-27 NOTE — PLAN OF CARE
Problem: Anemia  Goal: Anemia Symptom Improvement  Outcome: Progressing   Ambulatory to clinic.  No c/o adverse effects.  Injectafer tolerated with no problems.  Ambulatory home.  NAD.

## 2025-01-04 ENCOUNTER — PATIENT MESSAGE (OUTPATIENT)
Dept: OTHER | Facility: OTHER | Age: 40
End: 2025-01-04
Payer: COMMERCIAL

## 2025-01-07 ENCOUNTER — ROUTINE PRENATAL (OUTPATIENT)
Dept: OBSTETRICS AND GYNECOLOGY | Facility: CLINIC | Age: 40
End: 2025-01-07
Payer: COMMERCIAL

## 2025-01-07 ENCOUNTER — TELEPHONE (OUTPATIENT)
Dept: OBSTETRICS AND GYNECOLOGY | Facility: CLINIC | Age: 40
End: 2025-01-07
Payer: COMMERCIAL

## 2025-01-07 ENCOUNTER — NURSE TRIAGE (OUTPATIENT)
Dept: ADMINISTRATIVE | Facility: CLINIC | Age: 40
End: 2025-01-07
Payer: COMMERCIAL

## 2025-01-07 VITALS
SYSTOLIC BLOOD PRESSURE: 110 MMHG | BODY MASS INDEX: 41.86 KG/M2 | DIASTOLIC BLOOD PRESSURE: 68 MMHG | WEIGHT: 221.56 LBS

## 2025-01-07 DIAGNOSIS — Z34.83 ENCOUNTER FOR SUPERVISION OF OTHER NORMAL PREGNANCY IN THIRD TRIMESTER: Primary | ICD-10-CM

## 2025-01-07 DIAGNOSIS — Z98.891 HISTORY OF CESAREAN SECTION: ICD-10-CM

## 2025-01-07 PROBLEM — Z34.93 ENCOUNTER FOR SUPERVISION OF NORMAL PREGNANCY IN THIRD TRIMESTER: Status: ACTIVE | Noted: 2025-01-07

## 2025-01-07 PROCEDURE — 99999 PR PBB SHADOW E&M-EST. PATIENT-LVL IV: CPT | Mod: PBBFAC,,, | Performed by: OBSTETRICS & GYNECOLOGY

## 2025-01-07 PROCEDURE — 87653 STREP B DNA AMP PROBE: CPT | Performed by: OBSTETRICS & GYNECOLOGY

## 2025-01-07 PROCEDURE — 0502F SUBSEQUENT PRENATAL CARE: CPT | Mod: CPTII,S$GLB,, | Performed by: OBSTETRICS & GYNECOLOGY

## 2025-01-07 NOTE — PROGRESS NOTES
Pt is here with complaints with possible PROM.  At around 730 this morning, went to go urinate, passed lots of mucus. Put on a pad and went to work.  Noted that pad was wet by the time she went to work.  Having some contractions now.  At least 3 since being in the room.  Feeling a lot of pressure. Another pad wet.  On speculum exam, negative pooling, negative valsalva.  Nitrazine negative.  Cervix is closed.  No PROM noted. + FM.  Continue PNV.  Labor precautions reviewed.  Pt to RTC in 1 week.  GBS today.

## 2025-01-07 NOTE — TELEPHONE ENCOUNTER
"36 weeks pregnant c/o vaginal pressure last 24 hrs. Lost mucous plug this morning. Vaginal pressure has increased since loss of plug.     Dispo-L&D now. Pt vu.     Reason for Disposition   Leakage of fluid from vagina  (Exception: Patient is uncertain, but thinks it might be urine incontinence.)    Additional Information   Negative: SEVERE constant abdominal pain (e.g., excruciating)   Negative: SEVERE bleeding (e.g., continuous red blood from vagina, or large blood clots)   Negative: Umbilical cord hanging out of the vagina (shiny, white, curled appearance, "like telephone cord")   Negative: Uncontrollable urge to push (i.e., feels like baby is coming out now)   Negative: Can see baby   Negative: Sounds like a life-threatening emergency to the triager   Negative: < 20 weeks pregnant   Negative: Vaginal bleeding    Protocols used: Pregnancy - Rupture of Membranes [id number]-DO    "

## 2025-01-07 NOTE — TELEPHONE ENCOUNTER
Spoke to patient and scheduled appointment today regarding concerns of fluid leakage and contractions.

## 2025-01-09 LAB — GROUP B STREPTOCOCCUS, PCR: NEGATIVE

## 2025-01-13 ENCOUNTER — TELEPHONE (OUTPATIENT)
Dept: OBSTETRICS AND GYNECOLOGY | Facility: CLINIC | Age: 40
End: 2025-01-13
Payer: COMMERCIAL

## 2025-01-13 ENCOUNTER — HOSPITAL ENCOUNTER (EMERGENCY)
Facility: OTHER | Age: 40
Discharge: HOME | End: 2025-01-13
Attending: OBSTETRICS & GYNECOLOGY
Payer: COMMERCIAL

## 2025-01-13 VITALS
RESPIRATION RATE: 18 BRPM | TEMPERATURE: 98 F | SYSTOLIC BLOOD PRESSURE: 101 MMHG | HEART RATE: 80 BPM | DIASTOLIC BLOOD PRESSURE: 55 MMHG | OXYGEN SATURATION: 96 %

## 2025-01-13 DIAGNOSIS — O47.9 UTERINE CONTRACTIONS DURING PREGNANCY: Primary | ICD-10-CM

## 2025-01-13 DIAGNOSIS — R82.4 KETONURIA: ICD-10-CM

## 2025-01-13 DIAGNOSIS — Z3A.36 36 WEEKS GESTATION OF PREGNANCY: ICD-10-CM

## 2025-01-13 DIAGNOSIS — O34.219 PREVIOUS CESAREAN SECTION COMPLICATING PREGNANCY: ICD-10-CM

## 2025-01-13 LAB
BILIRUBIN, POC UA: NEGATIVE
BILIRUBIN, POC UA: NEGATIVE
BLOOD, POC UA: ABNORMAL
BLOOD, POC UA: NEGATIVE
CLARITY, UA: CLEAR
CLARITY, UA: CLEAR
COLOR, UA: YELLOW
COLOR, UA: YELLOW
GLUCOSE, POC UA: NEGATIVE
GLUCOSE, POC UA: NEGATIVE
KETONES, POC UA: >=160 MG/DL
KETONES, POC UA: >=160 MG/DL
LEUKOCYTE EST, POC UA: ABNORMAL
LEUKOCYTE EST, POC UA: NEGATIVE
NITRITE, POC UA: NEGATIVE
NITRITE, POC UA: NEGATIVE
PH UR STRIP: 6 [PH] (ref 5–8)
PH UR STRIP: 6 [PH] (ref 5–8)
PROTEIN, POC UA: 30 MG/DL
PROTEIN, POC UA: ABNORMAL
SPECIFIC GRAVITY, POC UA: 1.02 (ref 1–1.03)
SPECIFIC GRAVITY, POC UA: 1.02 (ref 1–1.03)
UROBILINOGEN, POC UA: 0.2 E.U./DL
UROBILINOGEN, POC UA: 0.2 E.U./DL

## 2025-01-13 PROCEDURE — 99284 EMERGENCY DEPT VISIT MOD MDM: CPT | Mod: 25

## 2025-01-13 PROCEDURE — 59025 FETAL NON-STRESS TEST: CPT | Mod: 26,,, | Performed by: OBSTETRICS & GYNECOLOGY

## 2025-01-13 PROCEDURE — 96361 HYDRATE IV INFUSION ADD-ON: CPT

## 2025-01-13 PROCEDURE — 99284 EMERGENCY DEPT VISIT MOD MDM: CPT | Mod: 25,,, | Performed by: OBSTETRICS & GYNECOLOGY

## 2025-01-13 PROCEDURE — 96374 THER/PROPH/DIAG INJ IV PUSH: CPT

## 2025-01-13 PROCEDURE — 63600175 PHARM REV CODE 636 W HCPCS

## 2025-01-13 PROCEDURE — 59025 FETAL NON-STRESS TEST: CPT

## 2025-01-13 RX ORDER — BUTORPHANOL TARTRATE 1 MG/ML
1 INJECTION INTRAMUSCULAR; INTRAVENOUS ONCE
Status: COMPLETED | OUTPATIENT
Start: 2025-01-13 | End: 2025-01-13

## 2025-01-13 RX ADMIN — BUTORPHANOL TARTRATE 1 MG: 1 INJECTION, SOLUTION INTRAMUSCULAR; INTRAVENOUS at 09:01

## 2025-01-13 RX ADMIN — SODIUM CHLORIDE, POTASSIUM CHLORIDE, SODIUM LACTATE AND CALCIUM CHLORIDE 500 ML: 600; 310; 30; 20 INJECTION, SOLUTION INTRAVENOUS at 09:01

## 2025-01-13 RX ADMIN — SODIUM CHLORIDE, SODIUM LACTATE, POTASSIUM CHLORIDE, CALCIUM CHLORIDE AND DEXTROSE MONOHYDRATE 1000 ML: 5; 600; 310; 30; 20 INJECTION, SOLUTION INTRAVENOUS at 07:01

## 2025-01-13 NOTE — TELEPHONE ENCOUNTER
Spoke to patient. Patient states she has had contractions 4-7 minutes apart that has become increasingly more intense since 7 am thia morning. Patient advised to go to the OB ED.

## 2025-01-13 NOTE — TELEPHONE ENCOUNTER
----- Message from Smita sent at 1/13/2025  3:05 PM CST -----  Regarding: concerns  Name of Who is Calling: Darby           What is the request in detail: Symptom: Labor (Having Contractions)  Outcome: Instruct patient to Call 911 NOW!  Reason: Vaginal bleeding    The caller rejected this outcome.           Can the clinic reply by [facility]: Yes           [facility] [phone]

## 2025-01-13 NOTE — Clinical Note
"Darby Herculesen" Damico was seen and treated in our emergency department on 1/13/2025.  She may return to work on 01/15/2025.       If you have any questions or concerns, please don't hesitate to call.      Alexa Mobley MD"

## 2025-01-13 NOTE — ED PROVIDER NOTES
Encounter Date: 2025     History     Chief Complaint   Patient presents with    Contractions     Lauren K Damico is a 39 y.o. [id number] at 36w2d who presents complaining of contractions. Patient reports contractions that began at 0300 this morning which have persistently worsened throughout the day. She tried rest, hydration, etc. at home without improvement in the abdominal pain. She reports contractions have become more painful and more frequent, now q3-5 mins. She also reports intermittent leakage of fluid, noticing her underwear are wet with a thin non-odorous fluid. Reports an increase in mucoid vaginal discharge with a light pink tinge. She has not eaten today, last drank black coffee with a splash of cream at 0800. She reports normal fetal movement and has no further complaints at this time.     This IUP is complicated by h/o CS (elective - IOL 28h, not technically failed IOL, stopped at 2cm), h/o PPH, h/o postpartum PreE, obesity, h/o bariatric surgery, h/o LEEP, iron deficiency anemia, mood disorder.      Review of patient's allergies indicates:  No Known Allergies  Past Medical History:   Diagnosis Date    Abnormal Pap smear 12    colpo/LEEP    Anemia     Anxiety     PCOS (polycystic ovarian syndrome)      Past Surgical History:   Procedure Laterality Date    CERVICAL BIOPSY  W/ LOOP ELECTRODE EXCISION  10/17/2012     SECTION N/A 2020    Procedure:  SECTION;  Surgeon: Karo Zayas MD;  Location: [facility] L&D;  Service: OB/GYN;  Laterality: N/A;     SECTION  20    CHOLECYSTECTOMY  2015    GALLBLADDER SURGERY      GASTRIC RESTRICTION SURGERY      gastric sleeve in 2018    WISDOM TOOTH EXTRACTION       Family History   Problem Relation Name Age of Onset    Ovarian cancer Paternal Grandmother Beverly Damico     Heart disease Paternal Grandmother Beverly Damico     Breast cancer Paternal Aunt      Ovarian cancer Paternal Aunt      Breast cancer Paternal Aunt       Uterine cancer Paternal Aunt      Alcohol abuse Maternal Grandfather Brenden Nguyen     Depression Maternal Grandmother Annika Nguyen     Colon cancer Neg Hx       Social History     Tobacco Use    Smoking status: Never    Smokeless tobacco: Never   Substance Use Topics    Alcohol use: Not Currently     Alcohol/week: 5.0 standard drinks of alcohol     Types: 5 Glasses of wine per week    Drug use: No     Review of Systems   Constitutional:  Negative for fever.   HENT:  Negative for sore throat.    Respiratory:  Negative for shortness of breath.    Cardiovascular:  Negative for chest pain.   Gastrointestinal:  Negative for nausea.   Genitourinary:  Positive for vaginal bleeding (spotting) and vaginal discharge. Negative for dysuria.   Neurological:  Negative for syncope.     Physical Exam     Initial Vitals [01/13/25 1737]   BP Pulse Resp Temp SpO2   133/70 87 20 98.4 °F (36.9 °C) 99 %      MAP       --         Physical Exam    Nursing note and vitals reviewed.  Constitutional: She appears well-developed and well-nourished. She is not diaphoretic. No distress.   HENT:   Head: Normocephalic and atraumatic.   Eyes: EOM are normal.   Neck:   Normal range of motion.  Cardiovascular:  Normal rate.           Pulmonary/Chest: No respiratory distress.   Abdominal: Abdomen is soft. There is no abdominal tenderness.   Gravid abdomen There is no rebound and no guarding.   Musculoskeletal:         General: No tenderness or edema. Normal range of motion.      Cervical back: Normal range of motion.     Neurological: She is alert and oriented to person, place, and time.   Skin: Skin is warm and dry.   Psychiatric: She has a normal mood and affect. Her behavior is normal.     OB LABOR EXAM:                       Comments: SVE: 0/50/-3, tight band felt at external os, soft, repeat exam unchanged   SSE: moderate amount of blood tinged mucoid discharge, no active bleeding, negative Valsalva/nitrazine/ferning     ED Course   Fetal  non-stress test    Date/Time: 2025 5:46 PM    Performed by: Mary Nguyen MD  Authorized by: Mary Nguyen MD    Nonstress Test:     Variability:  6-25 BPM    Decelerations:  None    Accelerations:  15 bpm    Baseline:  130    Uterine Irritability: Yes      Contractions:  Irregular    Contraction Frequency:  2-3min > 5-8min  Biophysical Profile:     Nonstress Test Interpretation: reactive      Overall Impression:  Reassuring  Post-procedure:     Patient tolerance:  Patient tolerated the procedure well with no immediate complications    Labs Reviewed   POCT URINALYSIS W/O SCOPE - Abnormal       Result Value    Spec Grav UA 1.025      PH, UA 6.0      Protein, UA 30 (*)     Glucose, UA Negative      Ketones, UA >=160 (*)     Bilirubin, UA Negative      Blood, UA Large (*)     Leukocytes, UA Negative      Nitrite, UA Negative      Urobilinogen, UA 0.2      Color, UA POC Yellow      Clarity, UA, POC Clear     POCT URINALYSIS W/O SCOPE - Abnormal    Spec Grav UA 1.025      PH, UA 6.0      Protein, UA Trace (*)     Glucose, UA Negative      Ketones, UA >=160 (*)     Bilirubin, UA Negative      Blood, UA Negative      Leukocytes, UA Trace (*)     Nitrite, UA Negative      Urobilinogen, UA 0.2      Color, UA POC Yellow      Clarity, UA, POC Clear            Imaging Results    None          Medications   dextrose 5% lactated ringers bolus 1,000 mL (0 mLs Intravenous Stopped 25)   lactated ringers bolus 500 mL (0 mLs Intravenous Stopped 25)   butorphanol injection 1 mg (1 mg Intravenous Given 25)     Medical Decision Making  Lauren K Damico is a 39 y.o. [id number] at 36w2d who presents complaining of contractions and intermittent leakage of fluid.     Temp:  [98.4 °F (36.9 °C)] 98.4 °F (36.9 °C)  Pulse:  [87] 87  Resp:  [20] 20  SpO2:  [99 %] 99 %  BP: (133)/(70) 133/70    - NPO since 0800  - NST: reactive and reassuring  - [place]: q2-4 minutes spaced to 5-8minutes   - SVE/SSE: cervix  cl/th/hi (1740) >> recheck at 1945, and 2230 unchanged, moderate amount of blood tinged mucoid discharge, neg Valsalva/pooling/nitrazine/ferning  - Urine dip: positive for ketones (160) x2  - 1L D5LR + 500LR bolus   - 1mg stadol     Patient with unchanged SVE after 5 hours. [place] q 2-4 minutes, spaced to 5-8 minutes after 1L D5LR + 500 LR bolus. Patient feels contractions spacing out. Pain much better controlled after 1mg stadol. Strict return to ED precautions for painful contractions  2-3minutes apart that do not subside after an hour, VB, LOF, or decreased fetal movement.   Patient stable for discharge at this time.       Alexa Mobley MD  Obstetrics & Gynecology, PGY-1      Risk  Prescription drug management.              Attending Attestation:   Physician Attestation Statement for Resident:  As the supervising MD   Physician Attestation Statement: I have personally seen and examined this patient.   I agree with the above history.  -:   As the supervising MD I agree with the above PE.     As the supervising MD I agree with the above treatment, course, plan, and disposition.   -:     NST reactive and reassuring, toco with regular contractions.  Exam not c/w labor.  UA with large ketones that persist after 1L D5LR, 500cc LR.  Contractions spaced after 500cc LR.  Agree with discharge to home.      Danielle Huston MD,TERRY  Date and Time: 01/14/2025 5:06 AM  OB Hospitalist    I was personally present during the critical portions of the procedure(s) performed by the resident and was immediately available in the ED to provide services and assistance as needed during the entire procedure.  I have reviewed and agree with the residents interpretation of the following: lab data.  I have reviewed the following: old records at this facility.                                       Clinical Impression:  Final diagnoses:  [O47.9] Uterine contractions during pregnancy (Primary)  [Z3A.36] 36 weeks gestation of  pregnancy  [O34.219] Previous  section complicating pregnancy  [R82.4] Ketonuria          ED Disposition Condition    Discharge Stable          ED Prescriptions    None       Follow-up Information    None          Alexa Mobley MD  Resident  [phone]       Danielle Huston MD  [phone]

## 2025-01-14 ENCOUNTER — ROUTINE PRENATAL (OUTPATIENT)
Dept: OBSTETRICS AND GYNECOLOGY | Facility: CLINIC | Age: 40
End: 2025-01-14
Payer: COMMERCIAL

## 2025-01-14 ENCOUNTER — LAB VISIT (OUTPATIENT)
Dept: LAB | Facility: OTHER | Age: 40
End: 2025-01-14
Attending: OBSTETRICS & GYNECOLOGY
Payer: COMMERCIAL

## 2025-01-14 VITALS
SYSTOLIC BLOOD PRESSURE: 110 MMHG | BODY MASS INDEX: 42.28 KG/M2 | DIASTOLIC BLOOD PRESSURE: 66 MMHG | WEIGHT: 223.75 LBS

## 2025-01-14 DIAGNOSIS — Z87.59 HISTORY OF PRE-ECLAMPSIA: ICD-10-CM

## 2025-01-14 DIAGNOSIS — Z34.83 ENCOUNTER FOR SUPERVISION OF OTHER NORMAL PREGNANCY IN THIRD TRIMESTER: ICD-10-CM

## 2025-01-14 DIAGNOSIS — Z34.83 ENCOUNTER FOR SUPERVISION OF OTHER NORMAL PREGNANCY IN THIRD TRIMESTER: Primary | ICD-10-CM

## 2025-01-14 DIAGNOSIS — Z98.891 HISTORY OF CESAREAN SECTION: ICD-10-CM

## 2025-01-14 LAB
HIV 1+2 AB+HIV1 P24 AG SERPL QL IA: NEGATIVE
TREPONEMA PALLIDUM IGG+IGM AB [PRESENCE] IN SERUM OR PLASMA BY IMMUNOASSAY: NONREACTIVE

## 2025-01-14 PROCEDURE — 86593 SYPHILIS TEST NON-TREP QUANT: CPT | Performed by: OBSTETRICS & GYNECOLOGY

## 2025-01-14 PROCEDURE — 99999 PR PBB SHADOW E&M-EST. PATIENT-LVL III: CPT | Mod: PBBFAC,,, | Performed by: OBSTETRICS & GYNECOLOGY

## 2025-01-14 PROCEDURE — 36415 COLL VENOUS BLD VENIPUNCTURE: CPT | Performed by: OBSTETRICS & GYNECOLOGY

## 2025-01-14 PROCEDURE — 0502F SUBSEQUENT PRENATAL CARE: CPT | Mod: CPTII,S$GLB,, | Performed by: OBSTETRICS & GYNECOLOGY

## 2025-01-14 PROCEDURE — 87389 HIV-1 AG W/HIV-1&-2 AB AG IA: CPT | Performed by: OBSTETRICS & GYNECOLOGY

## 2025-01-14 NOTE — DISCHARGE INSTRUCTIONS
Contact your primary OB or after hours at [phone] if you experience any of the following:    Contractions every 7-10 minutes for 1 or more hours.   A sudden gush or constant leaking of fluid.  Heavy vaginal bleeding.   If you experience a constant headache, blurry vision, pain underneath your right rib, or sudden swelling of your hands, feet, and face.   If you are 28 weeks pregnant or greater, you can measure kick counts with a goal of 10 or more movements within 2 hours.     Remember to stay hydrated; drink 8-10 bottles of water a day.     Maintain all follow-up appointments.

## 2025-01-14 NOTE — PROGRESS NOTES
Pt doing ok.  Still having contractions.  Was seen in the [facility] yesterday and was given IVF and stadol to help space out contractions.  Contractions have spaced out but still happening every 5-10 min apart.  Pt notes that she drinks 3-4 bottles of water a day.  Discussed increasing hydration.  Discussed techniques to help with contractions.  Cervical check unchanged.  Denies regular CTX, VB, VD, LOF.  + FM.  Continue PNV.  Labor precautions reviewed.  Pt to RTC in 1 week.  3rd trim labs today with anemia labs

## 2025-01-15 ENCOUNTER — ANESTHESIA EVENT (OUTPATIENT)
Dept: OBSTETRICS AND GYNECOLOGY | Facility: OTHER | Age: 40
End: 2025-01-15
Payer: COMMERCIAL

## 2025-01-15 ENCOUNTER — HOSPITAL ENCOUNTER (INPATIENT)
Facility: OTHER | Age: 40
LOS: 3 days | Discharge: HOME | End: 2025-01-18
Attending: OBSTETRICS & GYNECOLOGY | Admitting: OBSTETRICS & GYNECOLOGY
Payer: COMMERCIAL

## 2025-01-15 ENCOUNTER — ANESTHESIA (OUTPATIENT)
Dept: OBSTETRICS AND GYNECOLOGY | Facility: OTHER | Age: 40
End: 2025-01-15
Payer: COMMERCIAL

## 2025-01-15 DIAGNOSIS — Z3A.36 36 WEEKS GESTATION OF PREGNANCY: ICD-10-CM

## 2025-01-15 DIAGNOSIS — O09.523 MULTIGRAVIDA OF ADVANCED MATERNAL AGE IN THIRD TRIMESTER: ICD-10-CM

## 2025-01-15 DIAGNOSIS — O34.219 PREVIOUS CESAREAN DELIVERY AFFECTING PREGNANCY: ICD-10-CM

## 2025-01-15 DIAGNOSIS — Z98.891 STATUS POST REPEAT LOW TRANSVERSE CESAREAN SECTION: Primary | ICD-10-CM

## 2025-01-15 DIAGNOSIS — O42.919 PRETERM PREMATURE RUPTURE OF MEMBRANES (PPROM) WITH UNKNOWN ONSET OF LABOR: ICD-10-CM

## 2025-01-15 PROBLEM — E66.09 CLASS 2 OBESITY DUE TO EXCESS CALORIES WITHOUT SERIOUS COMORBIDITY WITH BODY MASS INDEX (BMI) OF 37.0 TO 37.9 IN ADULT: Status: ACTIVE | Noted: 2025-01-15

## 2025-01-15 PROBLEM — N91.2 AMENORRHEA: Status: RESOLVED | Noted: 2024-07-03 | Resolved: 2025-01-15

## 2025-01-15 PROBLEM — J31.0 RHINITIS: Status: RESOLVED | Noted: 2022-10-23 | Resolved: 2025-01-15

## 2025-01-15 PROBLEM — E66.01 SEVERE OBESITY DUE TO EXCESS CALORIES AFFECTING PREGNANCY IN THIRD TRIMESTER: Status: ACTIVE | Noted: 2025-01-15

## 2025-01-15 PROBLEM — U07.1 COVID: Status: RESOLVED | Noted: 2022-10-23 | Resolved: 2025-01-15

## 2025-01-15 PROBLEM — E66.812 CLASS 2 OBESITY DUE TO EXCESS CALORIES WITHOUT SERIOUS COMORBIDITY WITH BODY MASS INDEX (BMI) OF 37.0 TO 37.9 IN ADULT: Status: ACTIVE | Noted: 2025-01-15

## 2025-01-15 PROBLEM — R05.9 COUGH: Status: RESOLVED | Noted: 2022-10-23 | Resolved: 2025-01-15

## 2025-01-15 PROBLEM — O99.213 SEVERE OBESITY DUE TO EXCESS CALORIES AFFECTING PREGNANCY IN THIRD TRIMESTER: Status: ACTIVE | Noted: 2025-01-15

## 2025-01-15 LAB
ABO + RH BLD: NORMAL
BASOPHILS # BLD AUTO: 0.01 K/UL (ref 0–0.2)
BASOPHILS NFR BLD: 0.1 % (ref 0–1.9)
BLD GP AB SCN CELLS X3 SERPL QL: NORMAL
DIFFERENTIAL METHOD BLD: ABNORMAL
EOSINOPHIL # BLD AUTO: 0.1 K/UL (ref 0–0.5)
EOSINOPHIL NFR BLD: 0.7 % (ref 0–8)
ERYTHROCYTE [DISTWIDTH] IN BLOOD BY AUTOMATED COUNT: 16.7 % (ref 11.5–14.5)
HCT VFR BLD AUTO: 36.8 % (ref 37–48.5)
HGB BLD-MCNC: 12 G/DL (ref 12–16)
IMM GRANULOCYTES # BLD AUTO: 0.04 K/UL (ref 0–0.04)
IMM GRANULOCYTES NFR BLD AUTO: 0.5 % (ref 0–0.5)
LYMPHOCYTES # BLD AUTO: 1.3 K/UL (ref 1–4.8)
LYMPHOCYTES NFR BLD: 17.2 % (ref 18–48)
MCH RBC QN AUTO: 29.7 PG (ref 27–31)
MCHC RBC AUTO-ENTMCNC: 32.6 G/DL (ref 32–36)
MCV RBC AUTO: 91 FL (ref 82–98)
MONOCYTES # BLD AUTO: 0.6 K/UL (ref 0.3–1)
MONOCYTES NFR BLD: 7.7 % (ref 4–15)
NEUTROPHILS # BLD AUTO: 5.6 K/UL (ref 1.8–7.7)
NEUTROPHILS NFR BLD: 73.8 % (ref 38–73)
NRBC BLD-RTO: 0 /100 WBC
PLATELET # BLD AUTO: 279 K/UL (ref 150–450)
PMV BLD AUTO: 9.2 FL (ref 9.2–12.9)
RBC # BLD AUTO: 4.04 M/UL (ref 4–5.4)
SPECIMEN OUTDATE: NORMAL
TREPONEMA PALLIDUM IGG+IGM AB [PRESENCE] IN SERUM OR PLASMA BY IMMUNOASSAY: NONREACTIVE
WBC # BLD AUTO: 7.52 K/UL (ref 3.9–12.7)

## 2025-01-15 PROCEDURE — 85025 COMPLETE CBC W/AUTO DIFF WBC: CPT

## 2025-01-15 PROCEDURE — 25000003 PHARM REV CODE 250

## 2025-01-15 PROCEDURE — 63600175 PHARM REV CODE 636 W HCPCS

## 2025-01-15 PROCEDURE — 99285 EMERGENCY DEPT VISIT HI MDM: CPT | Mod: 25

## 2025-01-15 PROCEDURE — 37000009 HC ANESTHESIA EA ADD 15 MINS: Performed by: OBSTETRICS & GYNECOLOGY

## 2025-01-15 PROCEDURE — 37000008 HC ANESTHESIA 1ST 15 MINUTES: Performed by: OBSTETRICS & GYNECOLOGY

## 2025-01-15 PROCEDURE — 71000033 HC RECOVERY, INTIAL HOUR: Performed by: OBSTETRICS & GYNECOLOGY

## 2025-01-15 PROCEDURE — 63600175 PHARM REV CODE 636 W HCPCS: Mod: JZ,TB

## 2025-01-15 PROCEDURE — 36004725 HC OB OR TIME LEV III - EA ADD 15 MIN: Performed by: OBSTETRICS & GYNECOLOGY

## 2025-01-15 PROCEDURE — 59409 OBSTETRICAL CARE: CPT | Mod: ,,, | Performed by: ANESTHESIOLOGY

## 2025-01-15 PROCEDURE — 0UB70ZZ EXCISION OF BILATERAL FALLOPIAN TUBES, OPEN APPROACH: ICD-10-PCS | Performed by: OBSTETRICS & GYNECOLOGY

## 2025-01-15 PROCEDURE — 86593 SYPHILIS TEST NON-TREP QUANT: CPT

## 2025-01-15 PROCEDURE — 86900 BLOOD TYPING SEROLOGIC ABO: CPT

## 2025-01-15 PROCEDURE — 4A1HXCZ MONITORING OF PRODUCTS OF CONCEPTION, CARDIAC RATE, EXTERNAL APPROACH: ICD-10-PCS | Performed by: OBSTETRICS & GYNECOLOGY

## 2025-01-15 PROCEDURE — 59025 FETAL NON-STRESS TEST: CPT | Mod: 26,59,, | Performed by: OBSTETRICS & GYNECOLOGY

## 2025-01-15 PROCEDURE — 11000001 HC ACUTE MED/SURG PRIVATE ROOM

## 2025-01-15 PROCEDURE — 88302 TISSUE EXAM BY PATHOLOGIST: CPT | Mod: 59 | Performed by: PATHOLOGY

## 2025-01-15 PROCEDURE — 01968 ANES/ANALG CS DLVR NEURAXIAL: CPT | Mod: ,,, | Performed by: ANESTHESIOLOGY

## 2025-01-15 PROCEDURE — 99285 EMERGENCY DEPT VISIT HI MDM: CPT | Mod: 25,,, | Performed by: OBSTETRICS & GYNECOLOGY

## 2025-01-15 PROCEDURE — 88302 TISSUE EXAM BY PATHOLOGIST: CPT | Mod: 26,,, | Performed by: PATHOLOGY

## 2025-01-15 PROCEDURE — 59025 FETAL NON-STRESS TEST: CPT

## 2025-01-15 PROCEDURE — 27201423 OPTIME MED/SURG SUP & DEVICES STERILE SUPPLY: Performed by: OBSTETRICS & GYNECOLOGY

## 2025-01-15 PROCEDURE — 62326 NJX INTERLAMINAR LMBR/SAC: CPT

## 2025-01-15 PROCEDURE — 71000039 HC RECOVERY, EACH ADD'L HOUR: Performed by: OBSTETRICS & GYNECOLOGY

## 2025-01-15 PROCEDURE — 76815 OB US LIMITED FETUS(S): CPT | Mod: 26,,, | Performed by: OBSTETRICS & GYNECOLOGY

## 2025-01-15 PROCEDURE — 36004724 HC OB OR TIME LEV III - 1ST 15 MIN: Performed by: OBSTETRICS & GYNECOLOGY

## 2025-01-15 RX ORDER — OXYCODONE HYDROCHLORIDE 5 MG/1
5 TABLET ORAL EVERY 4 HOURS PRN
Status: DISCONTINUED | OUTPATIENT
Start: 2025-01-15 | End: 2025-01-18 | Stop reason: HOSPADM

## 2025-01-15 RX ORDER — FENTANYL/BUPIVACAINE/NS/PF 2MCG/ML-.1
PLASTIC BAG, INJECTION (ML) INJECTION
Status: DISPENSED
Start: 2025-01-15 | End: 2025-01-16

## 2025-01-15 RX ORDER — CARBOPROST TROMETHAMINE 250 UG/ML
250 INJECTION, SOLUTION INTRAMUSCULAR
Status: DISCONTINUED | OUTPATIENT
Start: 2025-01-15 | End: 2025-01-18 | Stop reason: HOSPADM

## 2025-01-15 RX ORDER — FAMOTIDINE 10 MG/ML
20 INJECTION INTRAVENOUS
Status: COMPLETED | OUTPATIENT
Start: 2025-01-15 | End: 2025-01-15

## 2025-01-15 RX ORDER — CEFAZOLIN 2 G/1
2 INJECTION, POWDER, FOR SOLUTION INTRAMUSCULAR; INTRAVENOUS
Status: DISCONTINUED | OUTPATIENT
Start: 2025-01-15 | End: 2025-01-18 | Stop reason: HOSPADM

## 2025-01-15 RX ORDER — ACETAMINOPHEN 10 MG/ML
INJECTION, SOLUTION INTRAVENOUS
Status: DISCONTINUED | OUTPATIENT
Start: 2025-01-15 | End: 2025-01-15

## 2025-01-15 RX ORDER — SODIUM CHLORIDE, SODIUM LACTATE, POTASSIUM CHLORIDE, CALCIUM CHLORIDE 600; 310; 30; 20 MG/100ML; MG/100ML; MG/100ML; MG/100ML
INJECTION, SOLUTION INTRAVENOUS CONTINUOUS PRN
Status: DISCONTINUED | OUTPATIENT
Start: 2025-01-15 | End: 2025-01-15

## 2025-01-15 RX ORDER — ONDANSETRON 8 MG/1
8 TABLET, ORALLY DISINTEGRATING ORAL EVERY 8 HOURS PRN
Status: DISCONTINUED | OUTPATIENT
Start: 2025-01-15 | End: 2025-01-18 | Stop reason: HOSPADM

## 2025-01-15 RX ORDER — LIDOCAINE HCL/EPINEPHRINE/PF 2%-1:200K
VIAL (ML) INJECTION
Status: DISCONTINUED | OUTPATIENT
Start: 2025-01-15 | End: 2025-01-15

## 2025-01-15 RX ORDER — ACETAMINOPHEN 500 MG
1000 TABLET ORAL
Status: DISCONTINUED | OUTPATIENT
Start: 2025-01-15 | End: 2025-01-17

## 2025-01-15 RX ORDER — DIPHENHYDRAMINE HYDROCHLORIDE 50 MG/ML
12.5 INJECTION INTRAMUSCULAR; INTRAVENOUS
Status: DISCONTINUED | OUTPATIENT
Start: 2025-01-15 | End: 2025-01-18 | Stop reason: HOSPADM

## 2025-01-15 RX ORDER — DEXAMETHASONE SODIUM PHOSPHATE 4 MG/ML
INJECTION, SOLUTION INTRA-ARTICULAR; INTRALESIONAL; INTRAMUSCULAR; INTRAVENOUS; SOFT TISSUE
Status: DISCONTINUED | OUTPATIENT
Start: 2025-01-15 | End: 2025-01-15

## 2025-01-15 RX ORDER — TRANEXAMIC ACID 10 MG/ML
1000 INJECTION, SOLUTION INTRAVENOUS EVERY 30 MIN PRN
Status: DISCONTINUED | OUTPATIENT
Start: 2025-01-15 | End: 2025-01-18 | Stop reason: HOSPADM

## 2025-01-15 RX ORDER — DIPHENHYDRAMINE HCL 25 MG
25 CAPSULE ORAL EVERY 6 HOURS PRN
Status: DISCONTINUED | OUTPATIENT
Start: 2025-01-15 | End: 2025-01-18 | Stop reason: HOSPADM

## 2025-01-15 RX ORDER — ONDANSETRON HYDROCHLORIDE 2 MG/ML
4 INJECTION, SOLUTION INTRAVENOUS EVERY 6 HOURS PRN
Status: DISCONTINUED | OUTPATIENT
Start: 2025-01-15 | End: 2025-01-18 | Stop reason: HOSPADM

## 2025-01-15 RX ORDER — ONDANSETRON HYDROCHLORIDE 2 MG/ML
INJECTION, SOLUTION INTRAMUSCULAR; INTRAVENOUS
Status: DISCONTINUED | OUTPATIENT
Start: 2025-01-15 | End: 2025-01-15

## 2025-01-15 RX ORDER — MORPHINE SULFATE 0.5 MG/ML
INJECTION, SOLUTION EPIDURAL; INTRATHECAL; INTRAVENOUS
Status: DISCONTINUED | OUTPATIENT
Start: 2025-01-15 | End: 2025-01-15

## 2025-01-15 RX ORDER — MISOPROSTOL 200 UG/1
800 TABLET ORAL ONCE AS NEEDED
Status: DISCONTINUED | OUTPATIENT
Start: 2025-01-15 | End: 2025-01-18 | Stop reason: HOSPADM

## 2025-01-15 RX ORDER — NALBUPHINE HYDROCHLORIDE 10 MG/ML
5 INJECTION INTRAMUSCULAR; INTRAVENOUS; SUBCUTANEOUS ONCE AS NEEDED
Status: COMPLETED | OUTPATIENT
Start: 2025-01-15 | End: 2025-01-16

## 2025-01-15 RX ORDER — PRENATAL WITH FERROUS FUM AND FOLIC ACID 3080; 920; 120; 400; 22; 1.84; 3; 20; 10; 1; 12; 200; 27; 25; 2 [IU]/1; [IU]/1; MG/1; [IU]/1; MG/1; MG/1; MG/1; MG/1; MG/1; MG/1; UG/1; MG/1; MG/1; MG/1; MG/1
1 TABLET ORAL DAILY
Status: DISCONTINUED | OUTPATIENT
Start: 2025-01-16 | End: 2025-01-18 | Stop reason: HOSPADM

## 2025-01-15 RX ORDER — IBUPROFEN 400 MG/1
800 TABLET ORAL
Status: DISCONTINUED | OUTPATIENT
Start: 2025-01-16 | End: 2025-01-18 | Stop reason: HOSPADM

## 2025-01-15 RX ORDER — ACETAMINOPHEN 325 MG/1
650 TABLET ORAL
Status: DISCONTINUED | OUTPATIENT
Start: 2025-01-15 | End: 2025-01-18 | Stop reason: HOSPADM

## 2025-01-15 RX ORDER — AMOXICILLIN 250 MG
1 CAPSULE ORAL NIGHTLY PRN
Status: DISCONTINUED | OUTPATIENT
Start: 2025-01-15 | End: 2025-01-18 | Stop reason: HOSPADM

## 2025-01-15 RX ORDER — AZITHROMYCIN MONOHYDRATE 500 MG/5ML
INJECTION, POWDER, LYOPHILIZED, FOR SOLUTION INTRAVENOUS
Status: COMPLETED
Start: 2025-01-15 | End: 2025-01-15

## 2025-01-15 RX ORDER — SIMETHICONE 80 MG
1 TABLET,CHEWABLE ORAL EVERY 6 HOURS PRN
Status: DISCONTINUED | OUTPATIENT
Start: 2025-01-15 | End: 2025-01-18 | Stop reason: HOSPADM

## 2025-01-15 RX ORDER — OXYTOCIN-SODIUM CHLORIDE 0.9% IV SOLN 30 UNIT/500ML 30-0.9/5 UT/ML-%
95 SOLUTION INTRAVENOUS CONTINUOUS PRN
Status: DISCONTINUED | OUTPATIENT
Start: 2025-01-15 | End: 2025-01-18 | Stop reason: HOSPADM

## 2025-01-15 RX ORDER — OXYCODONE HYDROCHLORIDE 10 MG/1
10 TABLET ORAL EVERY 4 HOURS PRN
Status: DISCONTINUED | OUTPATIENT
Start: 2025-01-15 | End: 2025-01-18 | Stop reason: HOSPADM

## 2025-01-15 RX ORDER — FENTANYL CITRATE 50 UG/ML
INJECTION, SOLUTION INTRAMUSCULAR; INTRAVENOUS
Status: DISCONTINUED | OUTPATIENT
Start: 2025-01-15 | End: 2025-01-15

## 2025-01-15 RX ORDER — PROCHLORPERAZINE EDISYLATE 5 MG/ML
INJECTION INTRAMUSCULAR; INTRAVENOUS
Status: DISCONTINUED | OUTPATIENT
Start: 2025-01-15 | End: 2025-01-15

## 2025-01-15 RX ORDER — SODIUM CITRATE AND CITRIC ACID MONOHYDRATE 334; 500 MG/5ML; MG/5ML
30 SOLUTION ORAL
Status: COMPLETED | OUTPATIENT
Start: 2025-01-15 | End: 2025-01-15

## 2025-01-15 RX ORDER — DOCUSATE SODIUM 100 MG/1
200 CAPSULE, LIQUID FILLED ORAL 2 TIMES DAILY
Status: DISCONTINUED | OUTPATIENT
Start: 2025-01-16 | End: 2025-01-18 | Stop reason: HOSPADM

## 2025-01-15 RX ORDER — HYDROCORTISONE 25 MG/G
CREAM TOPICAL 3 TIMES DAILY PRN
Status: DISCONTINUED | OUTPATIENT
Start: 2025-01-15 | End: 2025-01-18 | Stop reason: HOSPADM

## 2025-01-15 RX ORDER — KETOROLAC TROMETHAMINE 30 MG/ML
30 INJECTION, SOLUTION INTRAMUSCULAR; INTRAVENOUS
Status: COMPLETED | OUTPATIENT
Start: 2025-01-15 | End: 2025-01-16

## 2025-01-15 RX ORDER — METHYLERGONOVINE MALEATE 0.2 MG/ML
200 INJECTION INTRAVENOUS ONCE AS NEEDED
Status: DISCONTINUED | OUTPATIENT
Start: 2025-01-15 | End: 2025-01-18 | Stop reason: HOSPADM

## 2025-01-15 RX ORDER — OXYTOCIN 10 [USP'U]/ML
INJECTION, SOLUTION INTRAMUSCULAR; INTRAVENOUS
Status: DISCONTINUED | OUTPATIENT
Start: 2025-01-15 | End: 2025-01-15

## 2025-01-15 RX ORDER — SODIUM CHLORIDE, SODIUM LACTATE, POTASSIUM CHLORIDE, CALCIUM CHLORIDE 600; 310; 30; 20 MG/100ML; MG/100ML; MG/100ML; MG/100ML
INJECTION, SOLUTION INTRAVENOUS CONTINUOUS
Status: DISCONTINUED | OUTPATIENT
Start: 2025-01-15 | End: 2025-01-18 | Stop reason: HOSPADM

## 2025-01-15 RX ORDER — PROCHLORPERAZINE EDISYLATE 5 MG/ML
5 INJECTION INTRAMUSCULAR; INTRAVENOUS EVERY 6 HOURS PRN
Status: DISCONTINUED | OUTPATIENT
Start: 2025-01-15 | End: 2025-01-18 | Stop reason: HOSPADM

## 2025-01-15 RX ORDER — PHENYLEPHRINE HYDROCHLORIDE 10 MG/ML
INJECTION INTRAVENOUS
Status: DISCONTINUED | OUTPATIENT
Start: 2025-01-15 | End: 2025-01-15

## 2025-01-15 RX ADMIN — FAMOTIDINE 20 MG: 10 INJECTION, SOLUTION INTRAVENOUS at 06:01

## 2025-01-15 RX ADMIN — DEXAMETHASONE SODIUM PHOSPHATE 4 MG: 4 INJECTION, SOLUTION INTRAMUSCULAR; INTRAVENOUS at 07:01

## 2025-01-15 RX ADMIN — LIDOCAINE HYDROCHLORIDE,EPINEPHRINE BITARTRATE 9 ML: 20; .005 INJECTION, SOLUTION EPIDURAL; INFILTRATION; INTRACAUDAL; PERINEURAL at 07:01

## 2025-01-15 RX ADMIN — ACETAMINOPHEN 1000 MG: 10 INJECTION INTRAVENOUS at 07:01

## 2025-01-15 RX ADMIN — KETOROLAC TROMETHAMINE 30 MG: 30 INJECTION, SOLUTION INTRAMUSCULAR; INTRAVENOUS at 07:01

## 2025-01-15 RX ADMIN — ONDANSETRON 4 MG: 2 INJECTION INTRAMUSCULAR; INTRAVENOUS at 07:01

## 2025-01-15 RX ADMIN — SODIUM CITRATE AND CITRIC ACID MONOHYDRATE 30 ML: 500; 334 SOLUTION ORAL at 06:01

## 2025-01-15 RX ADMIN — PROCHLORPERAZINE EDISYLATE 2.5 MG: 5 INJECTION INTRAMUSCULAR; INTRAVENOUS at 07:01

## 2025-01-15 RX ADMIN — FENTANYL CITRATE 100 MCG: 50 INJECTION, SOLUTION INTRAMUSCULAR; INTRAVENOUS at 07:01

## 2025-01-15 RX ADMIN — SODIUM CHLORIDE, POTASSIUM CHLORIDE, SODIUM LACTATE AND CALCIUM CHLORIDE 500 ML: 600; 310; 30; 20 INJECTION, SOLUTION INTRAVENOUS at 04:01

## 2025-01-15 RX ADMIN — FENTANYL CITRATE 2.5 ML: 50 INJECTION INTRAMUSCULAR; INTRAVENOUS at 05:01

## 2025-01-15 RX ADMIN — OXYTOCIN 3 UNITS: 10 INJECTION, SOLUTION INTRAMUSCULAR; INTRAVENOUS at 07:01

## 2025-01-15 RX ADMIN — MORPHINE SULFATE 1 MG: 0.5 INJECTION, SOLUTION EPIDURAL; INTRATHECAL; INTRAVENOUS at 07:01

## 2025-01-15 RX ADMIN — FENTANYL CITRATE 8 ML/HR: 50 INJECTION INTRAMUSCULAR; INTRAVENOUS at 05:01

## 2025-01-15 RX ADMIN — AZITHROMYCIN MONOHYDRATE 500 MG: 500 INJECTION, POWDER, LYOPHILIZED, FOR SOLUTION INTRAVENOUS at 06:01

## 2025-01-15 RX ADMIN — SODIUM CHLORIDE, SODIUM LACTATE, POTASSIUM CHLORIDE, AND CALCIUM CHLORIDE: 600; 310; 30; 20 INJECTION, SOLUTION INTRAVENOUS at 07:01

## 2025-01-15 RX ADMIN — DEXTROSE 2 G: 50 INJECTION, SOLUTION INTRAVENOUS at 07:01

## 2025-01-15 RX ADMIN — OXYTOCIN 5 UNITS: 10 INJECTION, SOLUTION INTRAMUSCULAR; INTRAVENOUS at 07:01

## 2025-01-15 RX ADMIN — PHENYLEPHRINE HYDROCHLORIDE 100 MCG: 10 INJECTION INTRAVENOUS at 07:01

## 2025-01-15 RX ADMIN — LIDOCAINE HYDROCHLORIDE,EPINEPHRINE BITARTRATE 3 ML: 20; .005 INJECTION, SOLUTION EPIDURAL; INFILTRATION; INTRACAUDAL; PERINEURAL at 05:01

## 2025-01-15 NOTE — ED PROVIDER NOTES
Encounter Date: 1/15/2025       History     Chief Complaint   Patient presents with    Rupture of Membranes     Lauren K Damico is a 39 y.o. [id number] at 36w4d presents complaining of LOF since 10:30 am.      Patient denies contractions, denies vaginal bleeding, reports LOF.   Fetal Movement: normal.    This IUP is complicated by AMA, history of CS x1, and history of PP Pre-E.      Review of patient's allergies indicates:  No Known Allergies  Past Medical History:   Diagnosis Date    Abnormal Pap smear 12    colpo/LEEP    Anemia     Anxiety     PCOS (polycystic ovarian syndrome)      Past Surgical History:   Procedure Laterality Date    CERVICAL BIOPSY  W/ LOOP ELECTRODE EXCISION  10/17/2012     SECTION N/A 2020    Procedure:  SECTION;  Surgeon: Karo Zayas MD;  Location: [facility];  Service: OB/GYN;  Laterality: N/A;     SECTION  20    CHOLECYSTECTOMY  2015    GALLBLADDER SURGERY      GASTRIC RESTRICTION SURGERY      gastric sleeve in 2018    WISDOM TOOTH EXTRACTION       Family History   Problem Relation Name Age of Onset    Ovarian cancer Paternal Grandmother Beverly Damico     Heart disease Paternal Grandmother Beverly Damico     Breast cancer Paternal Aunt      Ovarian cancer Paternal Aunt      Breast cancer Paternal Aunt      Uterine cancer Paternal Aunt      Alcohol abuse Maternal Grandfather Brenden Patrick     Depression Maternal Grandmother Annika Nguyen     Colon cancer Neg Hx       Social History     Tobacco Use    Smoking status: Never    Smokeless tobacco: Never   Substance Use Topics    Alcohol use: Not Currently     Alcohol/week: 5.0 standard drinks of alcohol     Types: 5 Glasses of wine per week    Drug use: No     Review of Systems   Constitutional:  Negative for fever.   HENT:  Negative for sore throat.    Respiratory:  Negative for shortness of breath.    Cardiovascular:  Negative for chest pain.   Gastrointestinal:  Negative for abdominal pain  and nausea.   Genitourinary:  Positive for vaginal discharge (LOF). Negative for dysuria and vaginal bleeding.   Musculoskeletal:  Negative for back pain.   Skin:  Negative for rash.   Neurological:  Negative for weakness.   Hematological:  Does not bruise/bleed easily.       Physical Exam     Initial Vitals [01/15/25 1322]   BP Pulse Resp Temp SpO2   (!) 118/55 -- 16 98.2 °F (36.8 °C) 98 %      MAP       --         Physical Exam    Vitals reviewed.  Constitutional: She appears well-developed and well-nourished.   HENT:   Nose: Nose normal.   Eyes: Conjunctivae are normal.   Neck:   Normal range of motion.  Cardiovascular:  Intact distal pulses.           Pulmonary/Chest: No respiratory distress.   Abdominal: Abdomen is soft. She exhibits no distension. There is no abdominal tenderness. There is no rebound and no guarding.   Musculoskeletal:         General: Normal range of motion.      Cervical back: Normal range of motion.     Neurological: She is alert and oriented to person, place, and time.   Skin: Skin is warm and dry.   Psychiatric: She has a normal mood and affect. Judgment and thought content normal.     OB LABOR EXAM:     Membranes ruptured: Yes.   Method: Sterile speculum exam per MD.             Amniotic Fluid Color: clear.   Amniotic Fluid Amount: copious.   Comments: SSE: grossly ruptured  SVE: closed/thick/high       ED Course   Obtain Fetal nonstress test (NST)    Date/Time: 1/15/2025 1:36 PM    Performed by: Liliam Porter MD  Authorized by: Liliam Porter MD    Nonstress Test:     Variability:  6-25 BPM    Decelerations:  None    Baseline:  140  Biophysical Profile:     Nonstress Test Interpretation: reactive      Overall Impression:  Reassuring    Labs Reviewed   TREPONEMA PALLIDIUM ANTIBODIES IGG, IGM       Result Value    Treponema Pallidum Antibodies (IgG, IgM) Nonreactive      Narrative:     PRN if not within 72 hours   TYPE & SCREEN    Group & Rh O POS      Indirect [place] NEG      Specimen  Outdate 2025 23:59            Imaging Results    None          Medications   lactated ringers infusion (has no administration in time range)   acetaminophen tablet 1,000 mg (has no administration in time range)   sodium citrate-citric acid 500-334 mg/5 ml solution 30 mL (has no administration in time range)   famotidine (PF) injection 20 mg (has no administration in time range)   ceFAZolin 2 g (has no administration in time range)     Medical Decision Making   39 y.o. [id number] at 36w4d presents complaining of LOF since 10:30 am.   VSS  NST: reactive and reassuring, irregular contractions on toco  SSE: grossly ruptured  SVE: closed/thick/high  US: Vertex    Will admit for repeat     Risk  Decision regarding hospitalization.              Attending Attestation:   Physician Attestation Statement for Resident:  As the supervising MD   Physician Attestation Statement: I have personally seen and examined this patient.   I agree with the above history.  -:   As the supervising MD I agree with the above PE.     As the supervising MD I agree with the above treatment, course, plan, and disposition.   I was personally present during the critical portions of the procedure(s) performed by the resident and was immediately available in the ED to provide services and assistance as needed during the entire procedure.  I have reviewed and agree with the residents interpretation of the following: lab data.  I have reviewed the following: old records at this facility.            Attending ED Notes:   I have personally seen and examined the patient. I agree with the resident's note . Questions welcomed and answered to patient satisfaction.      @ 36w4d  presenting for SROM, contractions  NST: reactive and reassuring   Hgb 12    This IUP is complicated by h/o CS, h/o PPH, h/o postpartum PreE, obesity, h/o bariatric surgery, h/o LEEP, iron deficiency anemia, mood disorder.    Admit for  delivery secondary to  premature rupture of membranes. Plan for  delivery due to prior  and unfavorable cervix. Pt declines     Lacy Reyes MD  1/15/2025 8:13 PM                                 Clinical Impression:  Final diagnoses:  [O42.919]  premature rupture of membranes (PPROM) with unknown onset of labor (Primary)  [Z3A.36] 36 weeks gestation of pregnancy  [O34.219] Previous  delivery affecting pregnancy  [O09.523] Multigravida of advanced maternal age in third trimester          ED Disposition Condition    Admit Stable                Liliam Porter MD  Resident  01/15/25 1354       Lacy Chavez MD  01/15/25 2019

## 2025-01-15 NOTE — ANESTHESIA PROCEDURE NOTES
Epidural    Patient location during procedure: OB   Reason for block: primary anesthetic   Reason for block: labor analgesia requested by patient and obstetrician  Diagnosis: iup   Start time: 1/15/2025 4:25 PM  Timeout: 1/15/2025 4:23 PM  End time: 1/15/2025 4:43 PM  Surgery related to: Vaginal Delivery    Staffing  Performing Provider: Bryce Broderick DO  Authorizing Provider: Danyell Jiménez MD    Staffing  Performed by: Bryce Broderick DO  Authorized by: Danyell Jiménez MD        Preanesthetic Checklist  Completed: patient identified, IV checked, risks and benefits discussed, surgical consent, monitors and equipment checked, pre-op evaluation, timeout performed, anesthesia consent given, hand hygiene performed and patient being monitored  Preparation  Patient position: sitting  Prep: ChloraPrep  Patient monitoring: Pulse Ox  Reason for block: primary anesthetic   Epidural  Skin Anesthetic: lidocaine 1%  Skin Wheal: 3 mL  Administration type: continuous  Approach: midline  Interspace: L3-4    Injection technique: HERMANN air  Needle and Epidural Catheter  Needle type: Tuohy   Needle gauge: 17  Needle length: 3.5 inches  Needle insertion depth: 5 cm  Catheter type: springwound and multi-orifice  Catheter size: 19 G  Catheter at skin depth: 9 cm  Insertion Attempts: 2  Test dose: 3 mL of lidocaine 1.5% with Epi 1-to-200,000  Additional Documentation: incremental injection, negative aspiration for heme and CSF, no paresthesia on injection, no signs/symptoms of IV or SA injection, no significant pain on injection and no significant complaints from patient  Needle localization: anatomical landmarks  Medications:  Volume per aspiration: 5 mL  Time between aspirations: 5 minutes   Assessment  Ease of block: easy and moderate  Patient's tolerance of the procedure: comfortable throughout block and no complaints  Additional Notes  Pt more left than you would expect. She was very helpful in giving us feedback on location. No  inadvertent dural puncture with Tuohy.  Dural puncture performed with spinal needle.

## 2025-01-15 NOTE — H&P
HISTORY AND PHYSICAL                                                OBSTETRICS          Subjective:       Lauren K Damico is a 39 y.o.  female with IUP at 36w4d weeks gestation who presents with ROM.    Patient denies contractions, denies vaginal bleeding, reports LOF.   Fetal Movement: normal.    This IUP is complicated by obesity (pp BMI 37), history of CS x1, AMA and history of postpartum pre-eclampsia.    Review of Systems   Constitutional:  Negative for fever.   Respiratory:  Negative for shortness of breath.    Cardiovascular:  Negative for chest pain.   Gastrointestinal:  Negative for abdominal pain, nausea and vomiting.   Genitourinary:  Positive for vaginal discharge. Negative for dysuria and vaginal bleeding.   Integumentary:  : LOF.   Neurological:  Negative for headaches.       PMHx:   Past Medical History:   Diagnosis Date    Abnormal Pap smear 12    colpo/LEEP    Anemia     Anxiety     PCOS (polycystic ovarian syndrome)        PSHx:   Past Surgical History:   Procedure Laterality Date    CERVICAL BIOPSY  W/ LOOP ELECTRODE EXCISION  10/17/2012     SECTION N/A 2020    Procedure:  SECTION;  Surgeon: Karo Zyaas MD;  Location: [facility] L&D;  Service: OB/GYN;  Laterality: N/A;     SECTION  20    CHOLECYSTECTOMY  2015    GALLBLADDER SURGERY      GASTRIC RESTRICTION SURGERY      gastric sleeve in 2018    WISDOM TOOTH EXTRACTION         All: Review of patient's allergies indicates:  No Known Allergies    Meds: (Not in a hospital admission)      SH:   Social History     Socioeconomic History    Marital status:    Tobacco Use    Smoking status: Never    Smokeless tobacco: Never   Substance and Sexual Activity    Alcohol use: Not Currently     Alcohol/week: 5.0 standard drinks of alcohol     Types: 5 Glasses of wine per week    Drug use: No    Sexual activity: Yes     Partners: Male     Birth control/protection: None     Social Drivers of Health      Financial Resource Strain: Medium Risk (2024)    Overall Financial Resource Strain (CARDIA)     Difficulty of Paying Living Expenses: Somewhat hard   Food Insecurity: No Food Insecurity (2024)    Hunger Vital Sign     Worried About Running Out of Food in the Last Year: Never true     Ran Out of Food in the Last Year: Never true   Transportation Needs: No Transportation Needs (2024)    PRAPARE - Transportation     Lack of Transportation (Medical): No     Lack of Transportation (Non-Medical): No   Physical Activity: Insufficiently Active (2024)    Exercise Vital Sign     Days of Exercise per Week: 3 days     Minutes of Exercise per Session: 20 min   Stress: Stress Concern Present (2024)    [place] [employer] - Occupational Stress Questionnaire     Feeling of Stress : Rather much   Housing Stability: Low Risk  (2024)    Housing Stability Vital Sign     Unable to Pay for Housing in the Last Year: No     Number of Places Lived in the Last Year: 1     Unstable Housing in the Last Year: No       FH:   Family History   Problem Relation Name Age of Onset    Ovarian cancer Paternal Grandmother Beverly Damico     Heart disease Paternal Grandmother Beverly Damico     Breast cancer Paternal Aunt      Ovarian cancer Paternal Aunt      Breast cancer Paternal Aunt      Uterine cancer Paternal Aunt      Alcohol abuse Maternal Grandfather Brenden Nguyen     Depression Maternal Grandmother Annika Nguyen     Colon cancer Neg Hx         OBHx:   OB History    Para Term  AB Living   2 1 1 0 0 1   SAB IAB Ectopic Multiple Live Births   0 0 0 0 1      # Outcome Date GA Lbr [place]/2nd Weight Sex Type Anes PTL Lv   2 Current            1 Term 20 39w3d  3.59 kg (7 lb 14.6 oz) F CS-LTranv EPI N MIGUEL      Name: DAMICO,GIRL LUZ      Apgar1: 8  Apgar5: 9       Objective:       BP (!) 118/55 (BP Location: Right arm, Patient Position: Lying)   Temp 98.2 °F (36.8 °C) (Oral)    Resp 16   LMP 2024 (Exact Date)   SpO2 98%   Breastfeeding No     Vitals:    01/15/25 1322   BP: (!) 118/55   BP Location: Right arm   Patient Position: Lying   Resp: 16   Temp: 98.2 °F (36.8 °C)   TempSrc: Oral   SpO2: 98%       General:   alert, appears stated age and cooperative, no apparent distress   HENT:  normocephalic, atraumatic   Eyes:  extraocular movements and conjunctivae normal   Neck:  supple, range of motion normal, no thyromegaly   Lungs:   no respiratory distress   Heart:   regular rate   Abdomen:  soft, non-tender, non-distended but gravid, no rebound or guarding    Extremities negative edema, negative erythema   FHT: 140, moderate BTBV, +accels, -decels;  Cat 1 (reassuring)                 TOCO: Q irregular   Presentations: cephalic by ultrasound   Cervix:  Closed/thick/high   Sterile Speculum Exam: grossly ruptured    Recent Growth Scan: 32w2d 2209g (45%), AC 94%    Lab Review  Blood Type O POS  GBBS: negative  Rubella: Immune  RPR: negative  HIV: negative  HepB: negative       Assessment:       36w4d weeks gestation with     Active Hospital Problems    Diagnosis  POA    History of pre-eclampsia [Z87.59]  Not Applicable    History of elective  section [Z98.891]  Not Applicable      Resolved Hospital Problems   No resolved problems to display.          Plan:      1. PPROM  - Patient desires repeat   - Risks, benefits, alternatives and possible complications have been discussed in detail with the patient.   - Consents signed previously in clinic and in media  - Admit to Labor and Delivery unit   - Epidural per Anesthesia  - Draw CBC, T&S  - Notify Staff  - Plan for NPO time of 1830  Post-Partum Hemorrhage risk - medium    2. Obesity  - PP BMI 37  - Lovenox not indicated PP    3. History of postpartum pre-eclampsia  - VSS  - Continue to monitor     4. AMA  - Negative MT21    Liliam Porter MD  PGY-4 OB/GYN

## 2025-01-15 NOTE — ANESTHESIA PREPROCEDURE EVALUATION
01/15/2025  Lauren K Damico is a 39 y.o., female  female with IUP at 36w4d weeks gestation who presents with ROM.     This IUP is complicated by obesity (pp BMI 37), history of CS x1, AMA and history of postpartum pre-eclampsia.    Denies previous issues with neuraxial anesthesia.    Denies previous issues with general anesthesia.    Denies family history of issues with anesthesia.     Denies hx of seizures, strokes, heart failure, smoking, asthma, COPD, acid reflux, liver disease, kidney disease, bleeding disorders, taking blood thinners, back surgery      OB History    Para Term  AB Living   2 1 1     1   SAB IAB Ectopic Multiple Live Births         0 1      # Outcome Date GA Lbr Cortez/2nd Weight Sex Type Anes PTL Lv   2 Current            1 Term 20 39w3d  3.59 kg (7 lb 14.6 oz) F CS-LTranv EPI N MIGUEL       Wt Readings from Last 1 Encounters:   25 1031 101.5 kg (223 lb 12.3 oz)       BP Readings from Last 3 Encounters:   01/15/25 (!) 118/55   25 110/66   25 (!) 101/55       Patient Active Problem List   Diagnosis    Moderate dysplasia of cervix (ROSY II)    Iron deficiency anemia    H/O bariatric surgery    History of elective  section    History of  section    Severe pre-eclampsia, postpartum    Lymphadenopathy    History of pre-eclampsia    Encounter for supervision of normal pregnancy in third trimester    Multigravida of advanced maternal age in third trimester    Class 2 obesity due to excess calories without serious comorbidity with body mass index (BMI) of 37.0 to 37.9 in adult    Severe obesity due to excess calories affecting pregnancy in third trimester       Past Surgical History:   Procedure Laterality Date    CERVICAL BIOPSY  W/ LOOP ELECTRODE EXCISION  10/17/2012     SECTION N/A 2020    Procedure:  SECTION;   Surgeon: Karo Zayas MD;  Location: [facility] L&D;  Service: OB/GYN;  Laterality: N/A;     SECTION  20    CHOLECYSTECTOMY  2015    GALLBLADDER SURGERY      GASTRIC RESTRICTION SURGERY      gastric sleeve in 2018    WISDOM TOOTH EXTRACTION         Social History     Socioeconomic History    Marital status:    Tobacco Use    Smoking status: Never    Smokeless tobacco: Never   Substance and Sexual Activity    Alcohol use: Not Currently     Alcohol/week: 5.0 standard drinks of alcohol     Types: 5 Glasses of wine per week    Drug use: No    Sexual activity: Yes     Partners: Male     Birth control/protection: None     Social Drivers of Health     Financial Resource Strain: Medium Risk (2024)    Overall Financial Resource Strain (CARDIA)     Difficulty of Paying Living Expenses: Somewhat hard   Food Insecurity: No Food Insecurity (2024)    Hunger Vital Sign     Worried About Running Out of Food in the Last Year: Never true     Ran Out of Food in the Last Year: Never true   Transportation Needs: No Transportation Needs (2024)    PRAPARE - Transportation     Lack of Transportation (Medical): No     Lack of Transportation (Non-Medical): No   Physical Activity: Insufficiently Active (2024)    Exercise Vital Sign     Days of Exercise per Week: 3 days     Minutes of Exercise per Session: 20 min   Stress: Stress Concern Present (2024)    Citizen of the [place] of Occupational Health - Occupational Stress Questionnaire     Feeling of Stress : Rather much   Housing Stability: Low Risk  (2024)    Housing Stability Vital Sign     Unable to Pay for Housing in the Last Year: No     Number of Places Lived in the Last Year: 1     Unstable Housing in the Last Year: No         Chemistry        Component Value Date/Time     (L) 2024 1220    K 3.7 2024 1220     2024 1220    CO2 20 (L) 2024 1220    BUN 8 2024 1220    CREATININE 0.6 2024 1220      (H) 12/02/2024 1220        Component Value Date/Time    CALCIUM 8.4 (L) 12/02/2024 1220    ALKPHOS 83 12/02/2024 1220    AST 10 12/02/2024 1220    ALT 10 12/02/2024 1220    BILITOT 0.2 12/02/2024 1220    ESTGFRAFRICA >60.0 06/07/2021 1451    EGFRNONAA >60.0 06/07/2021 1451            Lab Results   Component Value Date    WBC 8.64 12/02/2024    HGB 11.2 (L) 12/02/2024    HCT 33.9 (L) 12/02/2024    MCV 88 12/02/2024     12/02/2024                 Pre-op Assessment    I have reviewed the Patient Summary Reports.    I have reviewed the NPO Status.   I have reviewed the Medications.     Review of Systems  Anesthesia Hx:  No problems with previous Anesthesia   History of prior surgery of interest to airway management or planning:  Previous anesthesia: Epidural           Cardiovascular:      Denies Hypertension.                                          Pulmonary:     Denies Asthma.                    Endocrine:  Denies Diabetes.               Physical Exam  General: Well nourished, Alert, Cooperative and Oriented    Airway:  Mallampati: III / II  Mouth Opening: Normal  TM Distance: Normal  Tongue: Normal  Neck ROM: Normal ROM    Dental:  Intact        Anesthesia Plan  Type of Anesthesia, risks & benefits discussed:    Anesthesia Type: Epidural, CSE, Spinal  Intra-op Monitoring Plan: Standard ASA Monitors  Post Op Pain Control Plan: multimodal analgesia  Induction:  IV  Airway Plan: Direct, Post-Induction  Informed Consent: Informed consent signed with the Patient and all parties understand the risks and agree with anesthesia plan.  All questions answered.   ASA Score: 3    Ready For Surgery From Anesthesia Perspective.     .

## 2025-01-16 LAB
BASOPHILS # BLD AUTO: 0.02 K/UL (ref 0–0.2)
BASOPHILS NFR BLD: 0.1 % (ref 0–1.9)
DIFFERENTIAL METHOD BLD: ABNORMAL
EOSINOPHIL # BLD AUTO: 0 K/UL (ref 0–0.5)
EOSINOPHIL NFR BLD: 0.1 % (ref 0–8)
ERYTHROCYTE [DISTWIDTH] IN BLOOD BY AUTOMATED COUNT: 16.3 % (ref 11.5–14.5)
HCT VFR BLD AUTO: 32 % (ref 37–48.5)
HGB BLD-MCNC: 10.7 G/DL (ref 12–16)
IMM GRANULOCYTES # BLD AUTO: 0.05 K/UL (ref 0–0.04)
IMM GRANULOCYTES NFR BLD AUTO: 0.4 % (ref 0–0.5)
LYMPHOCYTES # BLD AUTO: 1.4 K/UL (ref 1–4.8)
LYMPHOCYTES NFR BLD: 10 % (ref 18–48)
MCH RBC QN AUTO: 30.2 PG (ref 27–31)
MCHC RBC AUTO-ENTMCNC: 33.4 G/DL (ref 32–36)
MCV RBC AUTO: 90 FL (ref 82–98)
MONOCYTES # BLD AUTO: 0.9 K/UL (ref 0.3–1)
MONOCYTES NFR BLD: 6.2 % (ref 4–15)
NEUTROPHILS # BLD AUTO: 11.3 K/UL (ref 1.8–7.7)
NEUTROPHILS NFR BLD: 83.2 % (ref 38–73)
NRBC BLD-RTO: 0 /100 WBC
PLATELET # BLD AUTO: 268 K/UL (ref 150–450)
PMV BLD AUTO: 9.2 FL (ref 9.2–12.9)
RBC # BLD AUTO: 3.54 M/UL (ref 4–5.4)
WBC # BLD AUTO: 13.62 K/UL (ref 3.9–12.7)

## 2025-01-16 PROCEDURE — 11000001 HC ACUTE MED/SURG PRIVATE ROOM

## 2025-01-16 PROCEDURE — 85025 COMPLETE CBC W/AUTO DIFF WBC: CPT | Performed by: OBSTETRICS & GYNECOLOGY

## 2025-01-16 PROCEDURE — 59510 CESAREAN DELIVERY: CPT | Mod: AT,,, | Performed by: OBSTETRICS & GYNECOLOGY

## 2025-01-16 PROCEDURE — 25000003 PHARM REV CODE 250

## 2025-01-16 PROCEDURE — 58611 LIGATE OVIDUCT(S) ADD-ON: CPT | Mod: ,,, | Performed by: OBSTETRICS & GYNECOLOGY

## 2025-01-16 PROCEDURE — 63600175 PHARM REV CODE 636 W HCPCS: Mod: JZ,TB

## 2025-01-16 PROCEDURE — 36415 COLL VENOUS BLD VENIPUNCTURE: CPT | Performed by: OBSTETRICS & GYNECOLOGY

## 2025-01-16 RX ORDER — CALCIUM CARBONATE 200(500)MG
1000 TABLET,CHEWABLE ORAL DAILY PRN
Status: DISCONTINUED | OUTPATIENT
Start: 2025-01-16 | End: 2025-01-18 | Stop reason: HOSPADM

## 2025-01-16 RX ORDER — BUSPIRONE HYDROCHLORIDE 5 MG/1
5 TABLET ORAL 2 TIMES DAILY
Status: DISCONTINUED | OUTPATIENT
Start: 2025-01-16 | End: 2025-01-18 | Stop reason: HOSPADM

## 2025-01-16 RX ORDER — BUPROPION HYDROCHLORIDE 75 MG/1
75 TABLET ORAL 2 TIMES DAILY
Status: DISCONTINUED | OUTPATIENT
Start: 2025-01-16 | End: 2025-01-18 | Stop reason: HOSPADM

## 2025-01-16 RX ORDER — FENTANYL/BUPIVACAINE/NS/PF 2MCG/ML-.1
PLASTIC BAG, INJECTION (ML) INJECTION
Status: DISCONTINUED | OUTPATIENT
Start: 2025-01-15 | End: 2025-01-16

## 2025-01-16 RX ORDER — TRAZODONE HYDROCHLORIDE 50 MG/1
50 TABLET ORAL NIGHTLY
Status: DISCONTINUED | OUTPATIENT
Start: 2025-01-16 | End: 2025-01-18 | Stop reason: HOSPADM

## 2025-01-16 RX ORDER — CITALOPRAM 20 MG/1
20 TABLET, FILM COATED ORAL DAILY
Status: DISCONTINUED | OUTPATIENT
Start: 2025-01-16 | End: 2025-01-18 | Stop reason: HOSPADM

## 2025-01-16 RX ADMIN — CITALOPRAM HYDROBROMIDE 20 MG: 20 TABLET ORAL at 08:01

## 2025-01-16 RX ADMIN — PRENATAL VIT W/ FE FUMARATE-FA TAB 27-0.8 MG 1 TABLET: 27-0.8 TAB at 08:01

## 2025-01-16 RX ADMIN — KETOROLAC TROMETHAMINE 30 MG: 30 INJECTION, SOLUTION INTRAMUSCULAR; INTRAVENOUS at 08:01

## 2025-01-16 RX ADMIN — SIMETHICONE 80 MG: 80 TABLET, CHEWABLE ORAL at 01:01

## 2025-01-16 RX ADMIN — KETOROLAC TROMETHAMINE 30 MG: 30 INJECTION, SOLUTION INTRAMUSCULAR; INTRAVENOUS at 02:01

## 2025-01-16 RX ADMIN — DIPHENHYDRAMINE HYDROCHLORIDE 12.5 MG: 50 INJECTION, SOLUTION INTRAMUSCULAR; INTRAVENOUS at 03:01

## 2025-01-16 RX ADMIN — ACETAMINOPHEN 650 MG: 325 TABLET, FILM COATED ORAL at 02:01

## 2025-01-16 RX ADMIN — DOCUSATE SODIUM 200 MG: 100 CAPSULE, LIQUID FILLED ORAL at 08:01

## 2025-01-16 RX ADMIN — NALBUPHINE HYDROCHLORIDE 5 MG: 10 INJECTION, SOLUTION INTRAMUSCULAR; INTRAVENOUS; SUBCUTANEOUS at 08:01

## 2025-01-16 RX ADMIN — ACETAMINOPHEN 650 MG: 325 TABLET, FILM COATED ORAL at 08:01

## 2025-01-16 RX ADMIN — IBUPROFEN 800 MG: 400 TABLET ORAL at 08:01

## 2025-01-16 RX ADMIN — BUSPIRONE HYDROCHLORIDE 5 MG: 5 TABLET ORAL at 08:01

## 2025-01-16 RX ADMIN — KETOROLAC TROMETHAMINE 30 MG: 30 INJECTION, SOLUTION INTRAMUSCULAR; INTRAVENOUS at 01:01

## 2025-01-16 RX ADMIN — ACETAMINOPHEN 650 MG: 325 TABLET, FILM COATED ORAL at 01:01

## 2025-01-16 RX ADMIN — OXYCODONE 5 MG: 5 TABLET ORAL at 04:01

## 2025-01-16 RX ADMIN — TRAZODONE HYDROCHLORIDE 50 MG: 50 TABLET ORAL at 08:01

## 2025-01-16 NOTE — TRANSFER OF CARE
"Anesthesia Transfer of Care Note    Patient: Lauren K Damico    Procedure(s) Performed: Procedure(s) (LRB):   SECTION (N/A)    Patient location: Labor and Delivery    Anesthesia Type: epidural    Transport from OR: Transported from OR on room air with adequate spontaneous ventilation    Post pain: adequate analgesia    Post assessment: no apparent anesthetic complications    Post vital signs: stable    Level of consciousness: awake, alert and oriented    Nausea/Vomiting: no nausea/vomiting    Complications: none    Transfer of care protocol was followed      Last vitals: Visit Vitals  BP (!) 106/55   Pulse 82   Temp 36.8 °C (98.2 °F) (Oral)   Resp 16   Ht 5' 1" (1.549 m)   Wt 101.5 kg (223 lb 12.3 oz)   LMP 2024 (Exact Date)   SpO2 98%   Breastfeeding Unknown   BMI 42.28 kg/m²     "

## 2025-01-16 NOTE — L&D DELIVERY NOTE
[facility] - Labor & Delivery   Section   Operative Note    SUMMARY     Date of Procedure: 1/15/2025     Procedure: Procedure(s) (LRB):   SECTION (N/A)    Surgeons and Role:     * Aisha Hussein MD - Primary     * Adeel Martinez MD - Resident - Assisting    Pre-Operative Diagnosis: History of  section [Z98.891]    Post-Operative Diagnosis: Post-Op Diagnosis Codes:     * History of  section [Z98.891]     Section Procedure Note    Procedure:   1. Repeat  Section via Pfannenstiel skin incision  2. Bilateral tubal ligation with [place] method    Indications:   1. patient declines vag del attempt    Pre-operative Diagnosis:   1. IUP at 36 week 4 day pregnancy  2. H/o prior LTCS  3. H/o PPH  4. H/o preeclampsia  5. H/o bariatric surgery  6. H/o LEEP  7. Iron deficiency anemia    Post-operative Diagnosis:   S/p rLTCS and bilateral tubal ligation  3-7    Anesthesia: Epidural anesthesia    Findings:    1. Single viable  male infant, with APGARS 8/9, weight 3190 g.   2. Normal appearing uterus, ovaries, and fallopian tubes.  3. Normal placenta  4. Bilateral tubal ligation performed in the [place] fashion; bilateral tubal segments sent for routine pathologic review    Estimated Blood Loss:  900 mL           Total IV Fluids: see anesthesia report     UOP: per anesthesia    Specimens: cord blood, bilateral fallopian tube segments    PreOp CBC:   Lab Results   Component Value Date    WBC 7.52 01/15/2025    HGB 12.0 01/15/2025    HCT 36.8 (L) 01/15/2025    MCV 91 01/15/2025     01/15/2025     Complications:  None; patient tolerated the procedure well.           Disposition: PACU - hemodynamically stable.           Condition: stable    Procedure Details   The patient was seen in the Holding Room. The risks, benefits, complications, treatment options, and expected outcomes were discussed with the patient.  The patient concurred with the proposed plan, giving informed  consent.  The patient was taken to Operating Room, identified as Lauren K Damico and the procedure verified as repeat  Delivery and bilateral tubal ligation; her desire for permanent sterilization was verified several times. A Time Out was held and the above information confirmed.    After induction of anesthesia, the patient was prepped and draped in the usual sterile manner while placed in a dorsal supine position with a left lateral tilt.  A preciado catheter was also placed per nursing. Preoperative antibiotics Ancef 2 g and azithromycin 500 mg were administered. An allis test was performed confirming adequate anesthesia.  A Pfannenstiel incision was made and carried down through the subcutaneous tissue to the fascia. Fascial incision was made and extended transversely with curved garcia scissors. The fascia was grasped with [facility] clamps and  from the underlying rectus tissue superiorly and inferiorly. The peritoneum was identified, found to be free of adherent bowel, and entered bluntly. Peritoneal incision was extended longitudinally. The vesico-uterine peritoneum was identified, and bladder blade was inserted. The vesico-uterine peritoneum was incised transversely and the bladder flap was bluntly freed from the lower uterine segment. The bladder blade was reinserted to keep the bladder out of the operative field. A low transverse uterine incision was made with knife and extended with cephalo-caudad traction. The amniotic sac was ruptured with finger and the infant was noted to be in cephalic presentation. The fetal head was brought to the incision and elevated out of the pelvis. The patient delivered a single viable male infant without difficulty.  Infant weighed 3190 grams with Apgar scores of 8/9 at one and five minutes respectively. After the umbilical cord was clamped and cut, cord blood was obtained for evaluation. The placenta was removed intact, appeared normal, and was discarded. The  uterus was exteriorized. The uterine incision was closed with running locked sutures of 1 chromic. Hemostasis was observed along the hysterotomy. The uterine outline, tubes and ovaries appeared normal.     The right fallopian tube was then elevated with a Esthela clamp.  An avascular window within the mesosalpinx was identified and Bovie cautery used to incise.  Distal and proximal segments of the fallopian were then doubly ligated with 1 chromic, and a middle segment removed with Metzenbaum scissors ([place] method).  The same procedure was performed on the left fallopian tube.  A small amount of oozing near the left fibrae required an additional 1 chromic suture.  Good hemostasis was observed.    The uterus was returned to the abdominal cavity. Incision was reinspected and a small amount of oozing near the right angle of the hysterotomy appreciated.  A single figure of eight suture using 1 chromic was employed.  Slow oozing continued to be observed, so Surgicel hemostatic agent was employed.  Good hemostasis was observed thereafter.. The abdominal cavity was irrigated to remove clots. The peritoneum was not reapproximated.  Rectus muscles were reapproximated with two vertical mattress sutures using 1 chromic. The fascia was then reapproximated with running sutures of 1 PDS. The subcutaneous fat was reapproximated with 2-0 vicryl, and skin was reapproximated with 4-0 monocryl.    Instrument, sponge, and needle counts were correct prior the abdominal closure and at the conclusion of the case.     Pt tolerated procedure well and was in stable condition after the procedure.    **NOTE: This patient IS NOT a candidate for trial of labor after  delivery**      I was present for the entire procedure/operation and agree with above resident documentation.     Aisha Hussein MD    Specimens:   Specimen (24h ago, onward)      None          Condition: Good    VTE Risk Mitigation (From admission, onward)            "Ordered     Reason for No Pharmacological VTE Prophylaxis  Once        Question:  Reasons:  Answer:  Neuraxial Anesthesia    01/15/25 1949     Place sequential compression device  Until discontinued        Comments: Discontinue when catheter removed    01/15/25 1341                  Disposition: PACU - hemodynamically stable.    Attestation: Good       Delivery Information for Boy Lauren Damico    Birth information:  YOB: 2023   Time of birth: 7:32 PM   Sex: male   Head Delivery Date/Time: 1/15/2025  7:32 PM   Delivery type: , Low Transverse   Gestational Age: 36w4d       Delivery Providers    Delivering clinician: Aisha Hussein MD   Provider Role    Adeel Martinez MD Resident    Sugey Dallas RN Circulator    Matt Webster,  Surgical Tech    Tiff Denson RN Registered Nurse    Librado Galvez RN Charge Nurse    Bandar, Lisbet MELO MD Anesthesiologist    Mabel Cheung,  Resident    Maico Zuniga, DO Resident              Measurements    Weight: 3190 g  Weight (lbs): 7 lb 0.5 oz  Length: 50.2 cm  Length (in): 19.75"  Head circumference: 35.5 cm  Chest circumference: 30.5 cm         Apgars    Living status: Living  Apgar Component Scores:  1 min.:  5 min.:  10 min.:  15 min.:  20 min.:    Skin color:  0  1       Heart rate:  2  2       Reflex irritability:  2  2       Muscle tone:  2  2       Respiratory effort:  2  2       Total:  8  9       Apgars assigned by: ANGEL DENSON RNC         Operative Delivery    Forceps attempted?: No  Vacuum extractor attempted?: No         Shoulder Dystocia    Shoulder dystocia present?: No           Presentation    Presentation: Vertex           Interventions/Resuscitation           Cord    Vessels: 3 vessels  Complications: None  Delayed Cord Clamping?: Yes  Cord Clamped Date/Time: 1/15/2025  7:33 PM  Cord Blood Disposition: Sent with Baby  Gases Sent?: No  Stem Cell Collection (by MD): No       Placenta    Placenta delivery " date/time: 1/15/2025 1934  Placenta removal: Manual removal  Placenta appearance: Intact  Placenta disposition: Donation           Labor Events:       labor: Yes     Labor Onset Date/Time:         Dilation Complete Date/Time:         Start Pushing Date/Time:         Start Pushing Date/Time:       Rupture Date/Time: 01/15/25 104        Rupture type: PPROM (Premature Prolonged Rupture)        Fluid Amount:       Fluid Color: Clear              steroids: None     Antibiotics given for GBS: No     Induction:       Indications for induction:        Augmentation:       Indications for augmentation:       Labor complications: None     Additional complications:          Cervical ripening:                     Delivery:      Episiotomy:       Indication for Episiotomy:       Perineal Lacerations:   Repaired:      Periurethral Laceration:   Repaired:     Labial Laceration:   Repaired:     Sulcus Laceration:   Repaired:     Vaginal Laceration:   Repaired:     Cervical Laceration:   Repaired:     Repair suture:       Repair # of packets:       Last Value - EBL - Nursing (mL):       Sum - EBL - Nursing (mL): 0     Last Value - EBL - Anesthesia (mL):      Calculated QBL (mL): 900     Running total QBL (mL): 900     Vaginal Sweep Performed:       Surgicount Correct:       Vaginal Packing:   Quantity:       Other providers:       Anesthesia    Method: Epidural          Details (if applicable):  Trial of Labor No    Categorization: Repeat    Priority: Urgent   Indications for : Prior Uterine Surgery   Incision Type: low transverse     Additional  information:  Forceps:    Vacuum:    Breech:    Observed anomalies    Other (Comments):         Adeel Martinez MD MS  OB/Gyn  PGY-2

## 2025-01-16 NOTE — ANESTHESIA POSTPROCEDURE EVALUATION
Anesthesia Post Evaluation    Patient: Lauren K Damico    Procedure(s) Performed: Procedure(s) (LRB):   SECTION (N/A)    Final Anesthesia Type: CSE      Patient location during evaluation: med/surg floor  Patient participation: Yes- Able to Participate  Level of consciousness: awake and alert  Post-procedure vital signs: reviewed and stable  Pain management: adequate  Airway patency: patent  NICKY mitigation strategies: Use of major conduction anesthesia (spinal/epidural) or peripheral nerve block and Multimodal analgesia  PONV status at discharge: No PONV  Anesthetic complications: no      Cardiovascular status: blood pressure returned to baseline and hemodynamically stable  Respiratory status: unassisted and spontaneous ventilation  Hydration status: euvolemic  Follow-up not needed.              Vitals Value Taken Time   /56 25 0800   Temp 36.7 °C (98.1 °F) 25 08   Pulse 90 25 0800   Resp 18 25 08   SpO2 95 % 25         Event Time   Out of Recovery 22:50:00         Pain/Bobby Score: Pain Rating Prior to Med Admin: 5 (2025  8:46 AM)

## 2025-01-16 NOTE — PROGRESS NOTES
"POSTPARTUM PROGRESS NOTE    Subjective:     PPD/POD#: 1   Procedure: Repeat LTCS + BTL   EGA: 36w4d   N/V: No   F/C: No   Abd Pain: Mild, well-controlled with oral pain medication   Lochia: Mild   Voiding: Not yet voiding spontaneously, preciado removed this AM    Ambulating: Yes   Bowel fnc: No - Patient highly motivated to pass flatus. She is ambulating and plans to try coffee/gum to help pass flatus this morning.    Contraception: BTL with C/S     Denies any HA, visual changes, SOB, RUQ pain, edema.     Objective:      Temp:  [97.8 °F (36.6 °C)-98.3 °F (36.8 °C)] 98 °F (36.7 °C)  Pulse:  [74-98] 76  Resp:  [16-20] 20  SpO2:  [94 %-99 %] 95 %  BP: ()/(50-71) 94/57    Abdomen: Soft, appropriately tender   Uterus: Firm, no fundal tenderness   Incision: Bandage in place without shadowing     Lab Review    No results for input(s): "NA", "K", "CL", "CO2", "BUN", "CREATININE", "GLU", "PROT", "BILITOT", "ALKPHOS", "ALT", "AST", "MG", "PHOS" in the last 168 hours.    Recent Labs   Lab 01/15/25  1719 01/16/25  0619   WBC 7.52 13.62*   HGB 12.0 10.7*   HCT 36.8* 32.0*   MCV 91 90    268         I/O    Intake/Output Summary (Last 24 hours) at 1/16/2025 0654  Last data filed at 1/15/2025 2304  Gross per 24 hour   Intake 650.04 ml   Output 1050 ml   Net -399.96 ml        Assessment and Plan:   Postpartum care:  - Patient doing well.  - Continue routine management and advances.    Mood Disorder  - Mood stable  - Medications: Restarting home trazodone, buspar, wellbutrin, citalopram  - Will need 1-2 week postpartum mood check    ABLA on Chronic Anemia  - H/H as above  - QBL: 900  - asymptomatic  - received iron transfusions intrapartum and declines PO iron    AMA  - Encourage ambulation  - No PP Lovenox indicated at this time    Obesity  - PrePreg BMI 37  - Encourage ambulation  - Lovenox: not indicated    Marla Springer MD  Obstetrics & Gynecology, PGY-1   "

## 2025-01-16 NOTE — BRIEF OP NOTE
[facility] Labor & Delivery  Surgery Department  Operative Note    SUMMARY     Date of Procedure: 1/15/2025     Procedure: Procedure(s) (LRB):   SECTION (N/A)     Surgeons and Role:     * Aisha Hussein MD - Primary     * Adeel Martinez MD - Resident - Assisting    Pre-Operative Diagnosis: History of  section [Z98.891]    Post-Operative Diagnosis: Post-Op Diagnosis Codes:     * History of  section [Z98.891]    Anesthesia: Spinal/Epidural    Operative Findings (including complications, if any): Single, live born male infant weighing 3190 g and with APGARs 8/9 at 1 and 5 min respectively    Description of Technical Procedures: Please see full operative report for further details    Estimated Blood Loss (EBL): 900 cc    Specimens:   Specimen (24h ago, onward)      None           Condition: Good    Disposition: PACU - hemodynamically stable.      Adeel Martinez MD MS  OB/Gyn  PGY-2

## 2025-01-17 PROBLEM — F39 MOOD DISORDER: Status: ACTIVE | Noted: 2025-01-17

## 2025-01-17 PROBLEM — Z98.51 STATUS POST TUBAL LIGATION: Status: ACTIVE | Noted: 2025-01-17

## 2025-01-17 PROBLEM — D62 ACUTE BLOOD LOSS ANEMIA: Status: ACTIVE | Noted: 2025-01-17

## 2025-01-17 PROBLEM — Z98.891 STATUS POST REPEAT LOW TRANSVERSE CESAREAN SECTION: Status: ACTIVE | Noted: 2025-01-17

## 2025-01-17 LAB
FINAL PATHOLOGIC DIAGNOSIS: NORMAL
GROSS: NORMAL
Lab: NORMAL

## 2025-01-17 PROCEDURE — 25000003 PHARM REV CODE 250

## 2025-01-17 PROCEDURE — 11000001 HC ACUTE MED/SURG PRIVATE ROOM

## 2025-01-17 RX ORDER — LIDOCAINE 50 MG/G
1 PATCH TOPICAL
Status: DISCONTINUED | OUTPATIENT
Start: 2025-01-17 | End: 2025-01-18 | Stop reason: HOSPADM

## 2025-01-17 RX ADMIN — OXYCODONE HYDROCHLORIDE 10 MG: 10 TABLET ORAL at 01:01

## 2025-01-17 RX ADMIN — SIMETHICONE 80 MG: 80 TABLET, CHEWABLE ORAL at 09:01

## 2025-01-17 RX ADMIN — OXYCODONE HYDROCHLORIDE 10 MG: 10 TABLET ORAL at 05:01

## 2025-01-17 RX ADMIN — IBUPROFEN 800 MG: 400 TABLET ORAL at 10:01

## 2025-01-17 RX ADMIN — TRAZODONE HYDROCHLORIDE 50 MG: 50 TABLET ORAL at 09:01

## 2025-01-17 RX ADMIN — ACETAMINOPHEN 650 MG: 325 TABLET, FILM COATED ORAL at 03:01

## 2025-01-17 RX ADMIN — OXYCODONE HYDROCHLORIDE 10 MG: 10 TABLET ORAL at 11:01

## 2025-01-17 RX ADMIN — SIMETHICONE 80 MG: 80 TABLET, CHEWABLE ORAL at 01:01

## 2025-01-17 RX ADMIN — ACETAMINOPHEN 650 MG: 325 TABLET, FILM COATED ORAL at 08:01

## 2025-01-17 RX ADMIN — SENNOSIDES AND DOCUSATE SODIUM 1 TABLET: 50; 8.6 TABLET ORAL at 11:01

## 2025-01-17 RX ADMIN — OXYCODONE HYDROCHLORIDE 10 MG: 10 TABLET ORAL at 08:01

## 2025-01-17 RX ADMIN — ACETAMINOPHEN 650 MG: 325 TABLET, FILM COATED ORAL at 09:01

## 2025-01-17 RX ADMIN — CITALOPRAM HYDROBROMIDE 20 MG: 20 TABLET ORAL at 08:01

## 2025-01-17 RX ADMIN — ACETAMINOPHEN 650 MG: 325 TABLET, FILM COATED ORAL at 02:01

## 2025-01-17 RX ADMIN — SIMETHICONE 80 MG: 80 TABLET, CHEWABLE ORAL at 08:01

## 2025-01-17 RX ADMIN — OXYCODONE HYDROCHLORIDE 10 MG: 10 TABLET ORAL at 03:01

## 2025-01-17 RX ADMIN — IBUPROFEN 800 MG: 400 TABLET ORAL at 05:01

## 2025-01-17 RX ADMIN — BUSPIRONE HYDROCHLORIDE 5 MG: 5 TABLET ORAL at 09:01

## 2025-01-17 RX ADMIN — LIDOCAINE 5% 1 PATCH: 700 PATCH TOPICAL at 10:01

## 2025-01-17 RX ADMIN — SIMETHICONE 80 MG: 80 TABLET, CHEWABLE ORAL at 03:01

## 2025-01-17 RX ADMIN — DOCUSATE SODIUM 200 MG: 100 CAPSULE, LIQUID FILLED ORAL at 08:01

## 2025-01-17 RX ADMIN — BUPROPION HYDROCHLORIDE 75 MG: 75 TABLET, FILM COATED ORAL at 08:01

## 2025-01-17 RX ADMIN — OXYCODONE HYDROCHLORIDE 10 MG: 10 TABLET ORAL at 12:01

## 2025-01-17 RX ADMIN — PRENATAL VIT W/ FE FUMARATE-FA TAB 27-0.8 MG 1 TABLET: 27-0.8 TAB at 08:01

## 2025-01-17 RX ADMIN — IBUPROFEN 800 MG: 400 TABLET ORAL at 02:01

## 2025-01-17 RX ADMIN — BUSPIRONE HYDROCHLORIDE 5 MG: 5 TABLET ORAL at 08:01

## 2025-01-17 NOTE — PROGRESS NOTES
"POSTPARTUM PROGRESS NOTE    Subjective:     PPD/POD#: 2   Procedure: Repeat LTCS + BTL   EGA: 36w4d   N/V: No   F/C: No   Abd Pain: Mild, well-controlled with oral pain medication   Lochia: Mild   Voiding: Yes   Ambulating: Yes   Bowel fnc: Yes   Contraception: BTL with C/S     Denies any HA, visual changes, SOB, RUQ pain, edema.     Objective:      Temp:  [97.7 °F (36.5 °C)-98.1 °F (36.7 °C)] 98.1 °F (36.7 °C)  Pulse:  [] 97  Resp:  [16-19] 16  SpO2:  [95 %-97 %] 97 %  BP: (104-120)/(51-58) 104/51    Abdomen: Soft, appropriately tender   Uterus: Firm, no fundal tenderness   Incision: Bandage in place without shadowing     Lab Review    No results for input(s): "NA", "K", "CL", "CO2", "BUN", "CREATININE", "GLU", "PROT", "BILITOT", "ALKPHOS", "ALT", "AST", "MG", "PHOS" in the last 168 hours.    Recent Labs   Lab 01/15/25  1719 01/16/25  0619   WBC 7.52 13.62*   HGB 12.0 10.7*   HCT 36.8* 32.0*   MCV 91 90    268         I/O    Intake/Output Summary (Last 24 hours) at 1/17/2025 0640  Last data filed at 1/16/2025 1400  Gross per 24 hour   Intake --   Output 1000 ml   Net -1000 ml        Assessment and Plan:   Postpartum care:  - Patient doing well.  - Continue routine management and advances.    Mood Disorder  - Mood stable  - Medications: Restarting home trazodone, buspar, wellbutrin, citalopram  - Will need 1-2 week postpartum mood check    ABLA on Chronic Anemia  - H/H as above  - QBL: 900  - asymptomatic  - received iron transfusions intrapartum and declines PO iron    AMA  - Encourage ambulation  - No PP Lovenox indicated at this time    Obesity  - PrePreg BMI 37  - Encourage ambulation  - Lovenox: not indicated      Manuelito Holloway MD PGY2  Obstetrics and Gynecology    "

## 2025-01-18 VITALS
RESPIRATION RATE: 17 BRPM | HEART RATE: 96 BPM | WEIGHT: 223.75 LBS | SYSTOLIC BLOOD PRESSURE: 110 MMHG | TEMPERATURE: 98 F | HEIGHT: 61 IN | DIASTOLIC BLOOD PRESSURE: 60 MMHG | OXYGEN SATURATION: 97 % | BODY MASS INDEX: 42.24 KG/M2

## 2025-01-18 PROBLEM — O09.523 MULTIGRAVIDA OF ADVANCED MATERNAL AGE IN THIRD TRIMESTER: Status: RESOLVED | Noted: 2025-01-15 | Resolved: 2025-01-18

## 2025-01-18 PROBLEM — Z98.891: Status: RESOLVED | Noted: 2020-11-19 | Resolved: 2025-01-18

## 2025-01-18 PROBLEM — Z87.59 HISTORY OF PRE-ECLAMPSIA: Status: RESOLVED | Noted: 2024-10-21 | Resolved: 2025-01-18

## 2025-01-18 PROCEDURE — 25000003 PHARM REV CODE 250

## 2025-01-18 RX ORDER — OXYCODONE HYDROCHLORIDE 5 MG/1
5 TABLET ORAL EVERY 4 HOURS PRN
Qty: 20 TABLET | Refills: 0 | Status: SHIPPED | OUTPATIENT
Start: 2025-01-18

## 2025-01-18 RX ORDER — ACETAMINOPHEN 500 MG
1000 TABLET ORAL EVERY 6 HOURS
Qty: 60 TABLET | Refills: 1 | Status: SHIPPED | OUTPATIENT
Start: 2025-01-18

## 2025-01-18 RX ORDER — AMOXICILLIN 250 MG
1 CAPSULE ORAL NIGHTLY PRN
Qty: 30 TABLET | Refills: 0 | Status: SHIPPED | OUTPATIENT
Start: 2025-01-18

## 2025-01-18 RX ORDER — IBUPROFEN 600 MG/1
600 TABLET ORAL EVERY 6 HOURS PRN
Qty: 20 TABLET | Refills: 0 | Status: SHIPPED | OUTPATIENT
Start: 2025-01-18

## 2025-01-18 RX ORDER — IBUPROFEN 600 MG/1
600 TABLET ORAL EVERY 6 HOURS PRN
Qty: 20 TABLET | Refills: 0 | Status: SHIPPED | OUTPATIENT
Start: 2025-01-18 | End: 2025-01-18

## 2025-01-18 RX ADMIN — IBUPROFEN 800 MG: 400 TABLET ORAL at 11:01

## 2025-01-18 RX ADMIN — CITALOPRAM HYDROBROMIDE 20 MG: 20 TABLET ORAL at 09:01

## 2025-01-18 RX ADMIN — DOCUSATE SODIUM 200 MG: 100 CAPSULE, LIQUID FILLED ORAL at 09:01

## 2025-01-18 RX ADMIN — ACETAMINOPHEN 650 MG: 325 TABLET, FILM COATED ORAL at 11:01

## 2025-01-18 RX ADMIN — IBUPROFEN 800 MG: 400 TABLET ORAL at 02:01

## 2025-01-18 RX ADMIN — OXYCODONE 5 MG: 5 TABLET ORAL at 09:01

## 2025-01-18 RX ADMIN — LIDOCAINE 5% 1 PATCH: 700 PATCH TOPICAL at 11:01

## 2025-01-18 RX ADMIN — PRENATAL VIT W/ FE FUMARATE-FA TAB 27-0.8 MG 1 TABLET: 27-0.8 TAB at 09:01

## 2025-01-18 RX ADMIN — BUPROPION HYDROCHLORIDE 75 MG: 75 TABLET, FILM COATED ORAL at 09:01

## 2025-01-18 RX ADMIN — OXYCODONE 5 MG: 5 TABLET ORAL at 01:01

## 2025-01-18 RX ADMIN — BUSPIRONE HYDROCHLORIDE 5 MG: 5 TABLET ORAL at 09:01

## 2025-01-18 RX ADMIN — SIMETHICONE 80 MG: 80 TABLET, CHEWABLE ORAL at 09:01

## 2025-01-18 RX ADMIN — ACETAMINOPHEN 650 MG: 325 TABLET, FILM COATED ORAL at 05:01

## 2025-01-18 NOTE — PROGRESS NOTES
"POSTPARTUM PROGRESS NOTE    Subjective:     PPD/POD#: 3   Procedure: Repeat LTCS + BTL   EGA: 36w4d   N/V: Mild nausea, no vomiting   F/C: No   Abd Pain: Mild, well-controlled with oral pain medication   Lochia: Mild   Voiding: Yes   Ambulating: Yes   Bowel fnc: Yes   Contraception: BTL with C/S     Complains of burning pain at incision which is improved. Intermittent mild nausea. Denies any HA, visual changes, SOB, RUQ pain, edema.     Objective:      Temp:  [98 °F (36.7 °C)-98.7 °F (37.1 °C)] 98 °F (36.7 °C)  Pulse:  [] 93  Resp:  [16-17] 17  SpO2:  [95 %-97 %] 96 %  BP: (101-117)/(53-57) 101/53    Abdomen: Soft, appropriately tender   Uterus: Firm, no fundal tenderness   Incision: Bandage in place without shadowing     Lab Review    No results for input(s): "NA", "K", "CL", "CO2", "BUN", "CREATININE", "GLU", "PROT", "BILITOT", "ALKPHOS", "ALT", "AST", "MG", "PHOS" in the last 168 hours.    Recent Labs   Lab 01/15/25  1719 01/16/25  0619   WBC 7.52 13.62*   HGB 12.0 10.7*   HCT 36.8* 32.0*   MCV 91 90    268         I/O  No intake or output data in the 24 hours ending 01/18/25 0603       Assessment and Plan:   Postpartum care:  - Patient doing well.  - Continue routine management and advances.    Mood Disorder  - Mood stable  - Medications: Restarting home trazodone, buspar, wellbutrin, citalopram  - Will need 1-2 week postpartum mood check    ABLA on Chronic Anemia  - H/H as above  - QBL: 900  - asymptomatic  - received iron transfusions intrapartum     AMA  - Encourage ambulation  - No PP Lovenox indicated at this time    Obesity  - PrePreg BMI 37  - Encourage ambulation  - Lovenox: not indicated    H/o PreE  - BP: (101-117)/(53-57) 101/53  - No indication for agent at this time      Meredith Gould MD  OBGYN   PGY-1          "

## 2025-01-18 NOTE — DISCHARGE SUMMARY
Delivery Discharge Summary  Obstetrics      Primary OB Clinician: Aisha Hussein MD     Admission date: 1/15/2025  Discharge date: 2025    Disposition: To home, self care    Discharge Diagnosis List:      Patient Active Problem List   Diagnosis    Moderate dysplasia of cervix (ROSY II)    Iron deficiency anemia    H/O bariatric surgery    History of  section    Severe pre-eclampsia, postpartum    Lymphadenopathy    Encounter for supervision of normal pregnancy in third trimester    Class 2 obesity due to excess calories without serious comorbidity with body mass index (BMI) of 37.0 to 37.9 in adult    Severe obesity due to excess calories affecting pregnancy in third trimester    Mood disorder    Acute blood loss anemia    Status post repeat low transverse  section + BTL    Status post tubal ligation       Procedure: , due to history of  section    Hospital Course:  Lauren K Damico is a 39 y.o. now , POD #3 who was admitted on 1/15/2025 at 36w4d for PROM.  Pregnancy was complicated by obesity (pp BMI 37), history of CS x1, AMA and history of postpartum pre-eclampsia. Patient was subsequently admitted to labor and delivery unit with signed consents.     Delivery via  which was performed without complications.    Please see delivery note for further details. Her postpartum course was uncomplicated. On discharge day, patient's pain is controlled with oral pain medications. Pt is tolerating ambulation without SOB or CP, and regular diet without N/V. Reports lochia is mild. Denies any HA, vision changes, F/C, LE swelling. Denies any breast pain/soreness.    Pt in stable condition and ready for discharge. She has been instructed to start and/or continue medications and follow up with her obstetrics provider as listed below.    Pertinent studies:  CBC  Recent Labs   Lab 01/15/25  1719 25  0619   WBC 7.52 13.62*   HGB 12.0 10.7*   HCT 36.8* 32.0*   MCV 91 90  "   268        Immunization History   Administered Date(s) Administered    COVID-19, MRNA, LN-S, PF (MODERNA FULL 0.5 ML DOSE) 2021, 2021    DTaP 1987, 10/20/1988, 1989, 1989, 1991    HIB 1989    Hepatitis B, Pediatric/Adolescent 1995, 1995, 03/15/1996    IPV 1987, 1987, 1987, 10/20/1988, 1991    Influenza - Quadrivalent - PF *Preferred* (6 months and older) 2016, 10/04/2017, 10/29/2018, 2019, 2020    Influenza - Trivalent - Fluarix, Flulaval, Fluzone, Afluria - PF 2024    Influenza Split 10/13/2010    MMR 1988, 1991    Meningococcal Conjugate (MCV4P) 2005    PPD Test 2010    Td (Adult), Unspecified Formulation 10/07/1998    Tdap 2008, 2016, 2020        Delivery:    Episiotomy:     Lacerations:     Repair suture:     Repair # of packets:     Blood loss (ml):       Birth information:  YOB: 2023   Time of birth: 7:32 PM   Sex: male   Delivery type: , Low Transverse   Gestational Age: 36w4d     Measurements    Weight: 3190 g  Weight (lbs): 7 lb 0.5 oz  Length: 50.2 cm  Length (in): 19.75"  Head circumference: 35.5 cm  Chest circumference: 30.5 cm         Delivery Clinician: Delivery Providers    Delivering clinician: Aisha Hussein MD   Provider Role    Adeel Martinez MD Resident    Sugey Dallas RN Circulator    Matt Webster [facility]    Jarett, Tiff Denise, RN Registered Nurse    Librado Galvez RN Charge Nurse    Lisbet Portillo MD Anesthesiologist    Mabel Cheung, DO Resident    Maico Zuniga, DO Resident             Additional  information:  Forceps:    Vacuum:    Breech:    Observed anomalies      Living?:     Apgars    Living status: Living  Apgar Component Scores:  1 min.:  5 min.:  10 min.:  15 min.:  20 min.:    Skin color:  0  1       Heart rate:  2  2       Reflex irritability:  2  2       Muscle tone:  " 2  2       Respiratory effort:  2  2       Total:  8  9       Apgars assigned by: ANGEL ALARCON RNC         Placenta: Delivered:       appearance    Patient Instructions:   Current Discharge Medication List        START taking these medications    Details   acetaminophen (TYLENOL) 500 MG tablet Take 2 tablets (1,000 mg total) by mouth every 6 (six) hours. Alternate between ibuprofen and tylenol every 3 hours. For example: @0800: ibuprofen 600mg @1100: tylenol 1000mg @1400: ibuprofen 600mg @1700: tylenol 1000 mg @2000: ibuprofen 600mg  Qty: 60 tablet, Refills: 1      oxyCODONE (ROXICODONE) 5 MG immediate release tablet Take 1 tablet (5 mg total) by mouth every 4 (four) hours as needed for Pain.  Qty: 20 tablet, Refills: 0    Comments: Quantity prescribed more than 7 day supply? No  Associated Diagnoses: Status post repeat low transverse  section      senna-docusate 8.6-50 mg (PERICOLACE) 8.6-50 mg per tablet Take 1 tablet by mouth nightly as needed for Constipation.  Qty: 30 tablet, Refills: 0           CONTINUE these medications which have CHANGED    Details   ibuprofen (ADVIL,MOTRIN) 600 MG tablet Take 1 tablet (600 mg total) by mouth every 6 (six) hours as needed for Pain. Alternate between ibuprofen and tylenol every 3 hours. For example: @0800: ibuprofen 600mg @1100: tylenol 1000mg @1400: ibuprofen 600mg @1700: tylenol 1000 mg @2000: ibuprofen 600mg  Qty: 20 tablet, Refills: 0           CONTINUE these medications which have NOT CHANGED    Details   ALPRAZolam (XANAX) 0.25 MG tablet Take 1 tablet (0.25 mg total) by mouth nightly as needed for Anxiety.  Qty: 30 tablet, Refills: 2    Comments: This prescription was filled on 5/15/2024. Any refills authorized will be placed on file.  Associated Diagnoses: Mood disorder      buPROPion (WELLBUTRIN) 75 MG tablet Take 0.5 tablets (37.5 mg total) by mouth 2 (two) times daily.  Qty: 30 tablet, Refills: 11    Associated Diagnoses: Mood disorder      busPIRone (BUSPAR) 5  MG Tab TAKE ONE TABLET BY MOUTH TWICE DAILY *MAY CAUSE DROWSINESS*  Qty: 60 tablet, Refills: 0    Associated Diagnoses: Unspecified mood (affective) disorder      cetirizine (ZYRTEC) 10 MG tablet Take 10 mg by mouth.      citalopram (CELEXA) 20 MG tablet Take 1 tablet (20 mg total) by mouth once daily.  Qty: 30 tablet, Refills: 11    Associated Diagnoses: Mood disorder      esomeprazole (NEXIUM) 20 MG capsule Take 20 mg by mouth 2 (two) times daily.  Refills: 0      MAGNESIUM ORAL Take 100 mg by mouth once daily.      ondansetron (ZOFRAN) 4 MG tablet Take 1 tablet (4 mg total) by mouth every 8 (eight) hours as needed for Nausea.  Qty: 12 tablet, Refills: 0      ondansetron (ZOFRAN-ODT) 4 MG TbDL Take 1 tablet (4 mg total) by mouth every 6 (six) hours as needed.  Qty: 30 tablet, Refills: 1    Associated Diagnoses: Nausea and vomiting in pregnancy      PNV no.95/ferrous fum/folic ac (PRENATAL ORAL) Take by mouth once daily. Ritual brand      traZODone (DESYREL) 50 MG tablet Take 1 tablet (50 mg total) by mouth every evening.  Qty: 30 tablet, Refills: 11    Associated Diagnoses: Insomnia, unspecified type           STOP taking these medications       aspirin (ECOTRIN) 81 MG EC tablet Comments:   Reason for Stopping:               Discharge Procedure Orders   Diet Adult Regular     Lifting restrictions   Order Comments: No lifting more than infant for six weeks.     Other restrictions (specify):     No driving until:   Order Comments: Comfortable stepping on gas and brakes     Pelvic Rest   Order Comments: Nothing in vagina, including intercourse, for at least six weeks     Notify your health care provider if you experience any of the following:  temperature >100.4     Notify your health care provider if you experience any of the following:  persistent nausea and vomiting or diarrhea     Notify your health care provider if you experience any of the following:  severe uncontrolled pain     Notify your health care  provider if you experience any of the following:  redness, tenderness, or signs of infection (pain, swelling, redness, odor or green/yellow discharge around incision site)     Notify your health care provider if you experience any of the following:  severe persistent headache     Notify your health care provider if you experience any of the following:  persistent dizziness, light-headedness, or visual disturbances     Notify your health care provider if you experience any of the following:  increased confusion or weakness     Notify your health care provider if you experience any of the following:   Order Comments: Heavy vaginal bleeding, saturating more than two pads in one hour     Activity as tolerated        Follow-up Information       Aisha Hussein MD Follow up in 2 week(s).    Specialty: Obstetrics and Gynecology  Why: For postpartum mood check  Contact information:  [address] [address] [phone]               Aisha Hussein MD Follow up in 6 week(s).    Specialty: Obstetrics and Gynecology  Why: For postpartum visit  Contact information:  [address] [address] [phone]                              Xi Galarza MD  OB/GYN PGY-3

## 2025-01-18 NOTE — PLAN OF CARE
Problem:  Fall Injury Risk  Goal: Absence of Fall, Infant Drop and Related Injury  Outcome: Met     Problem: Anesthesia/Analgesia, Neuraxial  Goal: Safe, Effective Infusion Delivery  Outcome: Met  Goal: Absence of Infection Signs and Symptoms  Outcome: Met  Goal: Nausea and Vomiting Relief  Outcome: Met  Goal: Effective Pain Control  Outcome: Met  Goal: Effective Oxygenation and Ventilation  Outcome: Met  Goal: Baseline Motor Function  Outcome: Met  Goal: Effective Urinary Elimination  Outcome: Met     Problem: Postpartum ( Delivery)  Goal: Successful Parent Role Transition  Outcome: Met  Goal: Hemostasis  Outcome: Met  Goal: Effective Bowel Elimination  Outcome: Met  Goal: Fluid and Electrolyte Balance  Outcome: Met  Goal: Absence of Infection Signs and Symptoms  Outcome: Met  Goal: Anesthesia/Sedation Recovery  Outcome: Met  Goal: Optimal Pain Control and Function  Outcome: Met  Goal: Nausea and Vomiting Relief  Outcome: Met  Goal: Effective Urinary Elimination  Outcome: Met  Goal: Effective Oxygenation and Ventilation  Outcome: Met     Problem: Adult Inpatient Plan of Care  Goal: Plan of Care Review  Outcome: Met  Goal: Patient-Specific Goal (Individualized)  Outcome: Met  Goal: Absence of Hospital-Acquired Illness or Injury  Outcome: Met  Goal: Optimal Comfort and Wellbeing  Outcome: Met  Goal: Readiness for Transition of Care  Outcome: Met     Problem: Bariatric Environmental Safety  Goal: Safety Maintained with Care  Outcome: Met     Problem: Skin Injury Risk Increased  Goal: Skin Health and Integrity  Outcome: Met     Problem: Wound  Goal: Optimal Coping  Outcome: Met  Goal: Optimal Functional Ability  Outcome: Met  Goal: Absence of Infection Signs and Symptoms  Outcome: Met  Goal: Improved Oral Intake  Outcome: Met  Goal: Optimal Pain Control and Function  Outcome: Met  Goal: Skin Health and Integrity  Outcome: Met  Goal: Optimal Wound Healing  Outcome: Met     Problem: Infection  Goal:  Absence of Infection Signs and Symptoms  Outcome: Met

## 2025-01-18 NOTE — NURSING
D/C instructions given to patient regarding follow up appts, Rx prescribed by MD and S&S of preE and PPH. Pt stated understanding. Mom aware when to follow up with OB. Pt in no distress awaiting transport for d/c.

## 2025-01-24 ENCOUNTER — PATIENT MESSAGE (OUTPATIENT)
Dept: OBSTETRICS AND GYNECOLOGY | Facility: OTHER | Age: 40
End: 2025-01-24
Payer: COMMERCIAL

## 2025-02-13 ENCOUNTER — OFFICE VISIT (OUTPATIENT)
Dept: FAMILY MEDICINE | Facility: CLINIC | Age: 40
End: 2025-02-13
Payer: COMMERCIAL

## 2025-02-13 VITALS
HEIGHT: 61 IN | TEMPERATURE: 98 F | OXYGEN SATURATION: 97 % | DIASTOLIC BLOOD PRESSURE: 62 MMHG | BODY MASS INDEX: 36.33 KG/M2 | WEIGHT: 192.44 LBS | SYSTOLIC BLOOD PRESSURE: 118 MMHG | HEART RATE: 84 BPM

## 2025-02-13 DIAGNOSIS — Z00.00 ANNUAL PHYSICAL EXAM: Primary | ICD-10-CM

## 2025-02-13 PROCEDURE — 99395 PREV VISIT EST AGE 18-39: CPT | Mod: S$GLB,,, | Performed by: FAMILY MEDICINE

## 2025-02-13 PROCEDURE — 99999 PR PBB SHADOW E&M-EST. PATIENT-LVL IV: CPT | Mod: PBBFAC,,, | Performed by: FAMILY MEDICINE

## 2025-02-13 PROCEDURE — 1159F MED LIST DOCD IN RCRD: CPT | Mod: CPTII,S$GLB,, | Performed by: FAMILY MEDICINE

## 2025-02-13 PROCEDURE — 3074F SYST BP LT 130 MM HG: CPT | Mod: CPTII,S$GLB,, | Performed by: FAMILY MEDICINE

## 2025-02-13 PROCEDURE — 3078F DIAST BP <80 MM HG: CPT | Mod: CPTII,S$GLB,, | Performed by: FAMILY MEDICINE

## 2025-02-13 PROCEDURE — 3008F BODY MASS INDEX DOCD: CPT | Mod: CPTII,S$GLB,, | Performed by: FAMILY MEDICINE

## 2025-02-13 NOTE — PROGRESS NOTES
Subjective     Patient ID: Lauren K Damico is a 39 y.o. female.    Chief Complaint: Annual Exam and Medication Refill    39 year old female presents for an annual exam.s he is on celexa and wellbutrin.She is 1 month postpartuM. She feels she can focus so much better on her medication. She is not breast feeding.         History of Present Illness               Past Medical History:   Diagnosis Date    Abnormal Pap smear 12    colpo/LEEP    Anemia     Anxiety     PCOS (polycystic ovarian syndrome)       Past Surgical History:   Procedure Laterality Date    CERVICAL BIOPSY  W/ LOOP ELECTRODE EXCISION  10/17/2012     SECTION N/A 2020    Procedure:  SECTION;  Surgeon: Karo Zayas MD;  Location: [facility] L&D;  Service: OB/GYN;  Laterality: N/A;     SECTION  20     SECTION N/A 1/15/2025    Procedure:  SECTION;  Surgeon: Aisha Hussein MD;  Location: [facility] L&D;  Service: OB/GYN;  Laterality: N/A;    CHOLECYSTECTOMY  2015    GALLBLADDER SURGERY      GASTRIC RESTRICTION SURGERY      gastric sleeve in 2018    WISDOM TOOTH EXTRACTION       Family History   Problem Relation Name Age of Onset    Ovarian cancer Paternal Grandmother Beverly Damico     Heart disease Paternal Grandmother Beverly Damico     Breast cancer Paternal Aunt      Ovarian cancer Paternal Aunt      Breast cancer Paternal Aunt      Uterine cancer Paternal Aunt      Alcohol abuse Maternal Grandfather Brenden Nguyen     Depression Maternal Grandmother Annika Nguyen     Colon cancer Neg Hx       Social History     Socioeconomic History    Marital status:    Tobacco Use    Smoking status: Never    Smokeless tobacco: Never   Substance and Sexual Activity    Alcohol use: Not Currently     Alcohol/week: 5.0 standard drinks of alcohol     Types: 5 Glasses of wine per week    Drug use: No    Sexual activity: Yes     Partners: Male     Birth control/protection: None     Social Drivers of Health  "    Financial Resource Strain: Low Risk  (1/15/2025)    Overall Financial Resource Strain (CARDIA)     Difficulty of Paying Living Expenses: Not hard at all   Food Insecurity: No Food Insecurity (1/15/2025)    Hunger Vital Sign     Worried About Running Out of Food in the Last Year: Never true     Ran Out of Food in the Last Year: Never true   Transportation Needs: No Transportation Needs (1/15/2025)    TRANSPORTATION NEEDS     Transportation : No   Physical Activity: Insufficiently Active (2/14/2024)    Exercise Vital Sign     Days of Exercise per Week: 3 days     Minutes of Exercise per Session: 20 min   Stress: No Stress Concern Present (1/15/2025)    [employer] - Occupational Stress Questionnaire     Feeling of Stress : Only a little   Housing Stability: Unknown (1/15/2025)    Housing Stability Vital Sign     Unable to Pay for Housing in the Last Year: No     Homeless in the Last Year: No       Review of Systems   Constitutional:  Negative for chills, fatigue and fever.   Respiratory:  Negative for cough, chest tightness, shortness of breath and wheezing.    Cardiovascular:  Negative for chest pain, palpitations and leg swelling.   Gastrointestinal:  Negative for abdominal pain, blood in stool, diarrhea, nausea and vomiting.   Genitourinary:  Negative for dysuria, frequency and hematuria.   Integumentary:  Negative for rash.   Neurological:  Negative for dizziness, syncope and light-headedness.            Objective     Vitals:    02/13/25 1320   BP: 118/62   Pulse: 84   Temp: 97.9 °F (36.6 °C)   TempSrc: Oral   SpO2: 97%   Weight: 87.3 kg (192 lb 7.4 oz)   Height: 5' 1" (1.549 m)        Physical Exam  Constitutional:       General: She is not in acute distress.     Appearance: Normal appearance. She is well-developed. She is not ill-appearing, toxic-appearing or diaphoretic.   HENT:      Head: Normocephalic and atraumatic.      Right Ear: External ear normal.      Left Ear: External " ear normal.      Mouth/Throat:      Pharynx: No oropharyngeal exudate.   Eyes:      Conjunctiva/sclera: Conjunctivae normal.   Neck:      Thyroid: No thyromegaly.   Cardiovascular:      Rate and Rhythm: Normal rate and regular rhythm.      Heart sounds: Normal heart sounds. No murmur heard.     No friction rub. No gallop.   Pulmonary:      Effort: Pulmonary effort is normal. No respiratory distress.      Breath sounds: Normal breath sounds. No stridor. No wheezing, rhonchi or rales.   Abdominal:      General: Bowel sounds are normal. There is no distension.      Palpations: Abdomen is soft. There is no mass.      Tenderness: There is no abdominal tenderness. There is no guarding or rebound.   Musculoskeletal:      Cervical back: Normal range of motion and neck supple.   Lymphadenopathy:      Cervical: No cervical adenopathy.   Skin:     Findings: No rash.   Neurological:      Mental Status: She is alert.       Physical Exam                Assessment and Plan     1. Annual physical exam  -     CBC W/ AUTO DIFFERENTIAL; Future; Expected date: 02/13/2025  -     COMPREHENSIVE METABOLIC PANEL; Future; Expected date: 02/13/2025  -     TSH; Future; Expected date: 02/13/2025      Darby was seen today for annual exam and medication refill.    Diagnoses and all orders for this visit:    Annual physical exam  -     CBC W/ AUTO DIFFERENTIAL; Future  -     COMPREHENSIVE METABOLIC PANEL; Future  -     TSH; Future        Assessment & Plan                      No follow-ups on file.        This note was generated with the assistance of ambient listening technology. Verbal consent was obtained by the patient and accompanying visitor(s) for the recording of patient appointment to facilitate this note. I attest to having reviewed and edited the generated note for accuracy, though some syntax or spelling errors may persist. Please contact the author of this note for any clarification.

## 2025-02-24 DIAGNOSIS — F39 UNSPECIFIED MOOD (AFFECTIVE) DISORDER: ICD-10-CM

## 2025-02-24 DIAGNOSIS — F39 MOOD DISORDER: ICD-10-CM

## 2025-02-24 RX ORDER — CITALOPRAM 20 MG/1
20 TABLET, FILM COATED ORAL
Qty: 90 TABLET | Refills: 3 | Status: SHIPPED | OUTPATIENT
Start: 2025-02-24

## 2025-02-24 NOTE — TELEPHONE ENCOUNTER
No care due was identified.  [facility] Embedded Care Due Messages. Reference number: [id number].   2/24/2025 12:34:57 PM CST

## 2025-02-25 ENCOUNTER — POSTPARTUM VISIT (OUTPATIENT)
Dept: OBSTETRICS AND GYNECOLOGY | Facility: CLINIC | Age: 40
End: 2025-02-25
Payer: COMMERCIAL

## 2025-02-25 VITALS
SYSTOLIC BLOOD PRESSURE: 108 MMHG | WEIGHT: 195.56 LBS | HEIGHT: 61 IN | BODY MASS INDEX: 36.92 KG/M2 | DIASTOLIC BLOOD PRESSURE: 60 MMHG

## 2025-02-25 DIAGNOSIS — Z98.891 STATUS POST REPEAT LOW TRANSVERSE CESAREAN SECTION: Primary | ICD-10-CM

## 2025-02-25 PROCEDURE — 0503F POSTPARTUM CARE VISIT: CPT | Mod: CPTII,S$GLB,, | Performed by: OBSTETRICS & GYNECOLOGY

## 2025-02-25 PROCEDURE — 99999 PR PBB SHADOW E&M-EST. PATIENT-LVL II: CPT | Mod: PBBFAC,,, | Performed by: OBSTETRICS & GYNECOLOGY

## 2025-02-25 RX ORDER — BUSPIRONE HYDROCHLORIDE 5 MG/1
TABLET ORAL
Qty: 60 TABLET | Refills: 0 | Status: SHIPPED | OUTPATIENT
Start: 2025-02-25

## 2025-02-25 NOTE — TELEPHONE ENCOUNTER
Refill Routing Note   Medication(s) are not appropriate for processing by [facility] for the following reason(s):        Outside of protocol    [facility] action(s):  Approve  Route               Appointments  past 12m or future 3m with PCP    Date Provider   Last Visit   2/13/2025 Marie Fang MD   Next Visit   Visit date not found Marie Fang MD   ED visits in past 90 days: 1        Note composed:6:30 PM 02/24/2025

## 2025-02-25 NOTE — PROGRESS NOTES
"CC: Post-partum follow-up    Lauren K Damico is a 39 y.o. female  presents for post-partum visit s/p a , due to hx of CS .    Delivery Date: 01/15/2025  Delivery MD: Aisha Hussein MD  Gender: male  Birth Weight: 7 pounds 0 ounces  Breast Feeding: NO  Depression: taking wellbutrin and celexa-- buspar as needed.  Doing well and not having any issues at this time.  Contraception: bilateral tubal ligation    Pregnancy was complicated by:   obesity (pp BMI 37), history of CS x1, AMA and history of postpartum pre-eclampsia       /60 (Patient Position: Sitting)   Ht 5' 1" (1.549 m)   Wt 88.7 kg (195 lb 8.8 oz)   LMP 2024 (Exact Date)   Breastfeeding No   BMI 36.95 kg/m²     ROS:  GENERAL: No fever, chills, fatigability or weight loss.  VULVAR: No pain, no lesions and no itching.  VAGINAL: No relaxation, no itching, no discharge, no abnormal bleeding and no lesions.  ABDOMEN: No abdominal pain. Denies nausea. Denies vomiting. No diarrhea. No constipation  BREAST: Denies pain. No lumps. No discharge.  URINARY: No incontinence, no nocturia, no frequency and no dysuria.  CARDIOVASCULAR: No chest pain. No shortness of breath. No leg cramps.  NEUROLOGICAL: No headaches. No vision changes.    PHYSICAL EXAM:  ABDOMEN: Pfannenstiel incision well healed.  No signs of dehiscence, infection or bleeding.  Non-tender      Diagnosis:  1. Status post repeat low transverse  section + BTL        Plan:        Darby was seen today for postpartum care.    Diagnoses and all orders for this visit:    Status post repeat low transverse  section + BTL  - Doing well postpartum.  No restrictions moving forward.  - No signs of PP depression- doing well on medication  - formula feeding  - BTL for contraception      No orders of the defined types were placed in this encounter.      No follow-ups on file.    Aisha Hussein MD  Obstetrics & Gynecology      "

## 2025-03-21 DIAGNOSIS — F39 MOOD DISORDER: ICD-10-CM

## 2025-03-21 DIAGNOSIS — G47.00 INSOMNIA, UNSPECIFIED TYPE: ICD-10-CM

## 2025-03-21 RX ORDER — BUPROPION HYDROCHLORIDE 75 MG/1
37.5 TABLET ORAL 2 TIMES DAILY
Qty: 90 TABLET | Refills: 3 | Status: SHIPPED | OUTPATIENT
Start: 2025-03-21

## 2025-03-21 RX ORDER — TRAZODONE HYDROCHLORIDE 50 MG/1
50 TABLET ORAL NIGHTLY
Qty: 90 TABLET | Refills: 3 | Status: SHIPPED | OUTPATIENT
Start: 2025-03-21

## 2025-03-21 NOTE — TELEPHONE ENCOUNTER
No care due was identified.  [facility] Embedded Care Due Messages. Reference number: [id number].   3/21/2025 8:01:07 AM CDT

## 2025-03-21 NOTE — TELEPHONE ENCOUNTER
Lauren Damico  is requesting a refill authorization.  Brief Assessment and Rationale for Refill:  Approve     Medication Therapy Plan:  a      Pharmacist review requested: Yes   Extended chart review required: Yes   Comments:     Note composed:1:43 PM 03/21/2025

## 2025-03-27 DIAGNOSIS — F39 UNSPECIFIED MOOD (AFFECTIVE) DISORDER: ICD-10-CM

## 2025-03-27 RX ORDER — BUSPIRONE HYDROCHLORIDE 5 MG/1
5 TABLET ORAL 2 TIMES DAILY
Qty: 60 TABLET | Refills: 0 | Status: SHIPPED | OUTPATIENT
Start: 2025-03-27

## 2025-03-27 NOTE — TELEPHONE ENCOUNTER
No care due was identified.  [facility] Embedded Care Due Messages. Reference number: [id number].   3/27/2025 9:15:43 AM CDT

## 2025-07-07 NOTE — PROGRESS NOTES
Patient ID: Lauren K Damico is a 40 y.o. White female    Subjective  Chief Complaint: patient presents for medical weight loss management.    Contraindications to GLP-1 receptor agonist therapy:   Denies personal or family history of MTC and personal history of MEN2     Pregnancy Status:   - Pt denies current pregnancy, breastfeeding, or plans to become pregnant.  - Pt denies current use of oral hormonal contraception.     6 months post-partum    Co-morbidities: none PCOS (pt reported)    History of weight loss therapy:  - gastric sleeve 2018 - lost 70 lbs  hormones became regulate, after 1st child hormone problem returned and experienced weight gain    Weight loss history:  Starting weight: ( Pt reported)    07/08/2025   Recent Readings    Weight (lbs) 182 lb    BMI 33.06 BMI        Objective  Lab Results   Component Value Date     (L) 12/02/2024     09/23/2024     07/10/2024     Lab Results   Component Value Date    K 3.7 12/02/2024    K 3.7 09/23/2024    K 4.3 07/10/2024     Lab Results   Component Value Date     12/02/2024     09/23/2024     07/10/2024     Lab Results   Component Value Date    CO2 20 (L) 12/02/2024    CO2 23 09/23/2024    CO2 24 07/10/2024     Lab Results   Component Value Date    BUN 8 12/02/2024    BUN 8 09/23/2024    BUN 10 07/10/2024     Lab Results   Component Value Date     (H) 12/02/2024     09/23/2024    GLU 97 07/10/2024     Lab Results   Component Value Date    CALCIUM 8.4 (L) 12/02/2024    CALCIUM 8.6 (L) 09/23/2024    CALCIUM 9.3 07/10/2024     Lab Results   Component Value Date    PROT 6.0 12/02/2024    PROT 5.8 (L) 09/23/2024    PROT 6.4 07/10/2024     Lab Results   Component Value Date    ALBUMIN 2.7 (L) 12/02/2024    ALBUMIN 2.8 (L) 09/23/2024    ALBUMIN 3.2 (L) 07/10/2024     Lab Results   Component Value Date    BILITOT 0.2 12/02/2024    BILITOT 0.1 09/23/2024    BILITOT 0.2 07/10/2024     Lab Results   Component Value Date     AST 10 12/02/2024    AST 8 (L) 09/23/2024    AST 10 07/10/2024     Lab Results   Component Value Date    ALT 10 12/02/2024    ALT 8 (L) 09/23/2024    ALT 13 07/10/2024     Lab Results   Component Value Date    ANIONGAP 8 12/02/2024    ANIONGAP 7 (L) 09/23/2024    ANIONGAP 5 (L) 07/10/2024     Lab Results   Component Value Date    CREATININE 0.6 12/02/2024    CREATININE 0.6 09/23/2024    CREATININE 0.7 07/10/2024     Lab Results   Component Value Date    EGFRNORACEVR >60.0 12/02/2024    EGFRNORACEVR >60.0 09/23/2024    EGFRNORACEVR >60.0 07/10/2024     Assessment/Plan  -Pt qualifies for GLP-1 RA therapy based on BMI greater than or equal to 30 kg/m2  - Initiate Zepbound 2.5 mg SQ weekly x 4 weeks  - Then increase to Zepbound 5 mg SQ weekly  - RTC in 3 months for follow-up evaluation     Patient consented to pharmacist management via collaborative practice.

## 2025-07-08 ENCOUNTER — OFFICE VISIT (OUTPATIENT)
Dept: INTERNAL MEDICINE | Facility: CLINIC | Age: 40
End: 2025-07-08
Payer: COMMERCIAL

## 2025-07-08 ENCOUNTER — PATIENT MESSAGE (OUTPATIENT)
Dept: INTERNAL MEDICINE | Facility: CLINIC | Age: 40
End: 2025-07-08

## 2025-07-08 DIAGNOSIS — E66.811 OBESITY, CLASS I, BMI 30-34.9: Primary | ICD-10-CM

## 2025-07-08 PROCEDURE — 99499 UNLISTED E&M SERVICE: CPT | Mod: 95,,,

## 2025-07-08 RX ORDER — TIRZEPATIDE 2.5 MG/.5ML
2.5 INJECTION, SOLUTION SUBCUTANEOUS
Qty: 2 ML | Refills: 0 | Status: ACTIVE | OUTPATIENT
Start: 2025-07-08

## 2025-07-08 RX ORDER — TIRZEPATIDE 5 MG/.5ML
5 INJECTION, SOLUTION SUBCUTANEOUS
Qty: 2 ML | Refills: 1 | Status: ACTIVE | OUTPATIENT
Start: 2025-07-08

## 2025-08-01 ENCOUNTER — PATIENT MESSAGE (OUTPATIENT)
Dept: ADMINISTRATIVE | Facility: OTHER | Age: 40
End: 2025-08-01
Payer: COMMERCIAL

## 2025-08-28 ENCOUNTER — PATIENT MESSAGE (OUTPATIENT)
Dept: ADMINISTRATIVE | Facility: OTHER | Age: 40
End: 2025-08-28
Payer: COMMERCIAL

## (undated) DEVICE — HEMOSTAT SURGICEL PWD 3G